# Patient Record
Sex: FEMALE | Race: WHITE | NOT HISPANIC OR LATINO | Employment: FULL TIME
[De-identification: names, ages, dates, MRNs, and addresses within clinical notes are randomized per-mention and may not be internally consistent; named-entity substitution may affect disease eponyms.]

---

## 2018-01-23 ENCOUNTER — RECORDS - HEALTHEAST (OUTPATIENT)
Dept: ADMINISTRATIVE | Facility: OTHER | Age: 23
End: 2018-01-23

## 2018-09-21 ENCOUNTER — OFFICE VISIT - HEALTHEAST (OUTPATIENT)
Dept: FAMILY MEDICINE | Facility: CLINIC | Age: 23
End: 2018-09-21

## 2018-09-21 DIAGNOSIS — F91.9 BEHAVIOR DISTURBANCE: ICD-10-CM

## 2018-09-21 DIAGNOSIS — Z23 NEED FOR IMMUNIZATION AGAINST INFLUENZA: ICD-10-CM

## 2018-09-21 ASSESSMENT — MIFFLIN-ST. JEOR: SCORE: 1728.21

## 2018-10-05 ENCOUNTER — OFFICE VISIT - HEALTHEAST (OUTPATIENT)
Dept: BEHAVIORAL HEALTH | Facility: CLINIC | Age: 23
End: 2018-10-05

## 2018-10-05 ENCOUNTER — COMMUNICATION - HEALTHEAST (OUTPATIENT)
Dept: FAMILY MEDICINE | Facility: CLINIC | Age: 23
End: 2018-10-05

## 2018-10-05 DIAGNOSIS — F43.10 POST TRAUMATIC STRESS DISORDER (PTSD): ICD-10-CM

## 2018-10-15 ENCOUNTER — OFFICE VISIT - HEALTHEAST (OUTPATIENT)
Dept: FAMILY MEDICINE | Facility: CLINIC | Age: 23
End: 2018-10-15

## 2018-10-15 DIAGNOSIS — Z30.430 ENCOUNTER FOR INSERTION OF MIRENA IUD: ICD-10-CM

## 2018-10-15 DIAGNOSIS — Z11.3 SCREEN FOR STD (SEXUALLY TRANSMITTED DISEASE): ICD-10-CM

## 2018-10-15 DIAGNOSIS — Z12.4 SCREENING FOR CERVICAL CANCER: ICD-10-CM

## 2018-10-15 LAB
HCG UR QL: NEGATIVE
SP GR UR STRIP: 1.02 (ref 1–1.03)

## 2018-10-15 ASSESSMENT — MIFFLIN-ST. JEOR: SCORE: 1745.03

## 2018-10-16 LAB
C TRACH DNA SPEC QL PROBE+SIG AMP: NEGATIVE
HPV SOURCE: ABNORMAL
HUMAN PAPILLOMA VIRUS 16 DNA: NEGATIVE
HUMAN PAPILLOMA VIRUS 18 DNA: NEGATIVE
HUMAN PAPILLOMA VIRUS FINAL DIAGNOSIS: ABNORMAL
HUMAN PAPILLOMA VIRUS OTHER HR: POSITIVE
N GONORRHOEA DNA SPEC QL NAA+PROBE: NEGATIVE
SPECIMEN DESCRIPTION: ABNORMAL

## 2018-10-17 ENCOUNTER — OFFICE VISIT - HEALTHEAST (OUTPATIENT)
Dept: FAMILY MEDICINE | Facility: CLINIC | Age: 23
End: 2018-10-17

## 2018-10-17 DIAGNOSIS — F43.10 PTSD (POST-TRAUMATIC STRESS DISORDER): ICD-10-CM

## 2018-10-17 ASSESSMENT — MIFFLIN-ST. JEOR: SCORE: 1737.09

## 2018-10-24 ENCOUNTER — COMMUNICATION - HEALTHEAST (OUTPATIENT)
Dept: FAMILY MEDICINE | Facility: CLINIC | Age: 23
End: 2018-10-24

## 2018-10-24 LAB
BKR LAB AP ABNORMAL BLEEDING: NO
BKR LAB AP BIRTH CONTROL/HORMONES: ABNORMAL
BKR LAB AP CERVICAL APPEARANCE: NORMAL
BKR LAB AP GYN ADEQUACY: ABNORMAL
BKR LAB AP GYN INTERPRETATION: ABNORMAL
BKR LAB AP GYN OTHER FINDINGS: ABNORMAL
BKR LAB AP HPV REFLEX: ABNORMAL
BKR LAB AP LMP: ABNORMAL
BKR LAB AP PATIENT STATUS: ABNORMAL
BKR LAB AP PREVIOUS ABNORMAL: ABNORMAL
BKR LAB AP PREVIOUS NORMAL: ABNORMAL
HIGH RISK?: NO
INTERPRETING LAB: ABNORMAL
PATH REPORT.COMMENTS IMP SPEC: ABNORMAL
RESULT FLAG (HE HISTORICAL CONVERSION): ABNORMAL

## 2018-10-26 ENCOUNTER — OFFICE VISIT - HEALTHEAST (OUTPATIENT)
Dept: BEHAVIORAL HEALTH | Facility: CLINIC | Age: 23
End: 2018-10-26

## 2018-10-26 DIAGNOSIS — F43.10 POSTTRAUMATIC STRESS DISORDER: ICD-10-CM

## 2018-11-02 ENCOUNTER — OFFICE VISIT - HEALTHEAST (OUTPATIENT)
Dept: BEHAVIORAL HEALTH | Facility: CLINIC | Age: 23
End: 2018-11-02

## 2018-11-02 DIAGNOSIS — F43.10 POSTTRAUMATIC STRESS DISORDER: ICD-10-CM

## 2018-11-15 ENCOUNTER — COMMUNICATION - HEALTHEAST (OUTPATIENT)
Dept: SCHEDULING | Facility: CLINIC | Age: 23
End: 2018-11-15

## 2018-11-15 ENCOUNTER — RECORDS - HEALTHEAST (OUTPATIENT)
Dept: ADMINISTRATIVE | Facility: OTHER | Age: 23
End: 2018-11-15

## 2018-11-16 ENCOUNTER — OFFICE VISIT - HEALTHEAST (OUTPATIENT)
Dept: BEHAVIORAL HEALTH | Facility: CLINIC | Age: 23
End: 2018-11-16

## 2018-11-16 DIAGNOSIS — F43.10 POSTTRAUMATIC STRESS DISORDER: ICD-10-CM

## 2018-11-19 ENCOUNTER — COMMUNICATION - HEALTHEAST (OUTPATIENT)
Dept: SCHEDULING | Facility: CLINIC | Age: 23
End: 2018-11-19

## 2018-11-28 ENCOUNTER — OFFICE VISIT - HEALTHEAST (OUTPATIENT)
Dept: FAMILY MEDICINE | Facility: CLINIC | Age: 23
End: 2018-11-28

## 2018-11-28 DIAGNOSIS — N93.9 VAGINAL BLEEDING: ICD-10-CM

## 2018-11-28 DIAGNOSIS — F43.10 PTSD (POST-TRAUMATIC STRESS DISORDER): ICD-10-CM

## 2018-11-28 DIAGNOSIS — R11.0 NAUSEA: ICD-10-CM

## 2018-11-28 ASSESSMENT — MIFFLIN-ST. JEOR: SCORE: 1737.99

## 2018-11-30 ENCOUNTER — OFFICE VISIT - HEALTHEAST (OUTPATIENT)
Dept: BEHAVIORAL HEALTH | Facility: CLINIC | Age: 23
End: 2018-11-30

## 2018-11-30 DIAGNOSIS — F43.10 PTSD (POST-TRAUMATIC STRESS DISORDER): ICD-10-CM

## 2018-12-14 ENCOUNTER — OFFICE VISIT - HEALTHEAST (OUTPATIENT)
Dept: BEHAVIORAL HEALTH | Facility: CLINIC | Age: 23
End: 2018-12-14

## 2018-12-14 DIAGNOSIS — F43.10 PTSD (POST-TRAUMATIC STRESS DISORDER): ICD-10-CM

## 2018-12-21 ENCOUNTER — COMMUNICATION - HEALTHEAST (OUTPATIENT)
Dept: BEHAVIORAL HEALTH | Facility: CLINIC | Age: 23
End: 2018-12-21

## 2019-01-11 ENCOUNTER — OFFICE VISIT - HEALTHEAST (OUTPATIENT)
Dept: BEHAVIORAL HEALTH | Facility: CLINIC | Age: 24
End: 2019-01-11

## 2019-01-11 DIAGNOSIS — F43.10 PTSD (POST-TRAUMATIC STRESS DISORDER): ICD-10-CM

## 2019-01-16 ENCOUNTER — OFFICE VISIT - HEALTHEAST (OUTPATIENT)
Dept: FAMILY MEDICINE | Facility: CLINIC | Age: 24
End: 2019-01-16

## 2019-01-16 DIAGNOSIS — M54.81 OCCIPITAL NEURALGIA OF LEFT SIDE: ICD-10-CM

## 2019-01-16 DIAGNOSIS — S46.812A TRAPEZIUS STRAIN, LEFT, INITIAL ENCOUNTER: ICD-10-CM

## 2019-01-16 DIAGNOSIS — F39 MOOD DISORDER (H): ICD-10-CM

## 2019-01-16 DIAGNOSIS — F43.10 PTSD (POST-TRAUMATIC STRESS DISORDER): ICD-10-CM

## 2019-01-16 LAB
ANION GAP SERPL CALCULATED.3IONS-SCNC: 9 MMOL/L (ref 5–18)
BASOPHILS # BLD AUTO: 0 THOU/UL (ref 0–0.2)
BASOPHILS NFR BLD AUTO: 0 % (ref 0–2)
BUN SERPL-MCNC: 13 MG/DL (ref 8–22)
CALCIUM SERPL-MCNC: 8.8 MG/DL (ref 8.5–10.5)
CHLORIDE BLD-SCNC: 107 MMOL/L (ref 98–107)
CO2 SERPL-SCNC: 22 MMOL/L (ref 22–31)
CREAT SERPL-MCNC: 0.72 MG/DL (ref 0.6–1.1)
EOSINOPHIL # BLD AUTO: 0.1 THOU/UL (ref 0–0.4)
EOSINOPHIL NFR BLD AUTO: 1 % (ref 0–6)
ERYTHROCYTE [DISTWIDTH] IN BLOOD BY AUTOMATED COUNT: 15.8 % (ref 11–14.5)
GFR SERPL CREATININE-BSD FRML MDRD: >60 ML/MIN/1.73M2
GLUCOSE BLD-MCNC: 71 MG/DL (ref 70–125)
HCT VFR BLD AUTO: 31.6 % (ref 35–47)
HGB BLD-MCNC: 10.2 G/DL (ref 12–16)
LYMPHOCYTES # BLD AUTO: 2.6 THOU/UL (ref 0.8–4.4)
LYMPHOCYTES NFR BLD AUTO: 27 % (ref 20–40)
MCH RBC QN AUTO: 22.4 PG (ref 27–34)
MCHC RBC AUTO-ENTMCNC: 32.3 G/DL (ref 32–36)
MCV RBC AUTO: 69 FL (ref 80–100)
MONOCYTES # BLD AUTO: 0.5 THOU/UL (ref 0–0.9)
MONOCYTES NFR BLD AUTO: 5 % (ref 2–10)
NEUTROPHILS # BLD AUTO: 6.2 THOU/UL (ref 2–7.7)
NEUTROPHILS NFR BLD AUTO: 66 % (ref 50–70)
PLATELET # BLD AUTO: 352 THOU/UL (ref 140–440)
PMV BLD AUTO: 8.3 FL (ref 7–10)
POTASSIUM BLD-SCNC: 4.1 MMOL/L (ref 3.5–5)
RBC # BLD AUTO: 4.56 MILL/UL (ref 3.8–5.4)
SODIUM SERPL-SCNC: 138 MMOL/L (ref 136–145)
WBC: 9.4 THOU/UL (ref 4–11)

## 2019-01-16 ASSESSMENT — MIFFLIN-ST. JEOR: SCORE: 1755.23

## 2019-01-31 ENCOUNTER — OFFICE VISIT - HEALTHEAST (OUTPATIENT)
Dept: FAMILY MEDICINE | Facility: CLINIC | Age: 24
End: 2019-01-31

## 2019-01-31 DIAGNOSIS — G43.109 MIGRAINE WITH AURA AND WITHOUT STATUS MIGRAINOSUS, NOT INTRACTABLE: ICD-10-CM

## 2019-01-31 DIAGNOSIS — F43.10 PTSD (POST-TRAUMATIC STRESS DISORDER): ICD-10-CM

## 2019-01-31 DIAGNOSIS — F39 MOOD DISORDER (H): ICD-10-CM

## 2019-01-31 DIAGNOSIS — D50.8 OTHER IRON DEFICIENCY ANEMIA: ICD-10-CM

## 2019-01-31 ASSESSMENT — MIFFLIN-ST. JEOR: SCORE: 1778.76

## 2019-02-15 ENCOUNTER — COMMUNICATION - HEALTHEAST (OUTPATIENT)
Dept: FAMILY MEDICINE | Facility: CLINIC | Age: 24
End: 2019-02-15

## 2019-02-24 ENCOUNTER — RECORDS - HEALTHEAST (OUTPATIENT)
Dept: ADMINISTRATIVE | Facility: OTHER | Age: 24
End: 2019-02-24

## 2019-02-25 ENCOUNTER — COMMUNICATION - HEALTHEAST (OUTPATIENT)
Dept: FAMILY MEDICINE | Facility: CLINIC | Age: 24
End: 2019-02-25

## 2019-02-27 ENCOUNTER — OFFICE VISIT - HEALTHEAST (OUTPATIENT)
Dept: FAMILY MEDICINE | Facility: CLINIC | Age: 24
End: 2019-02-27

## 2019-02-27 DIAGNOSIS — F39 MOOD DISORDER (H): ICD-10-CM

## 2019-02-27 DIAGNOSIS — M79.644 PAIN OF FINGER OF RIGHT HAND: ICD-10-CM

## 2019-02-27 DIAGNOSIS — F43.10 PTSD (POST-TRAUMATIC STRESS DISORDER): ICD-10-CM

## 2019-02-27 DIAGNOSIS — G43.709 CHRONIC MIGRAINE WITHOUT AURA WITHOUT STATUS MIGRAINOSUS, NOT INTRACTABLE: ICD-10-CM

## 2019-02-27 ASSESSMENT — MIFFLIN-ST. JEOR: SCORE: 1805.13

## 2019-03-11 ENCOUNTER — COMMUNICATION - HEALTHEAST (OUTPATIENT)
Dept: FAMILY MEDICINE | Facility: CLINIC | Age: 24
End: 2019-03-11

## 2019-03-19 ENCOUNTER — OFFICE VISIT - HEALTHEAST (OUTPATIENT)
Dept: FAMILY MEDICINE | Facility: CLINIC | Age: 24
End: 2019-03-19

## 2019-03-19 ENCOUNTER — RECORDS - HEALTHEAST (OUTPATIENT)
Dept: GENERAL RADIOLOGY | Facility: CLINIC | Age: 24
End: 2019-03-19

## 2019-03-19 DIAGNOSIS — G43.709 CHRONIC MIGRAINE WITHOUT AURA WITHOUT STATUS MIGRAINOSUS, NOT INTRACTABLE: ICD-10-CM

## 2019-03-19 DIAGNOSIS — F39 MOOD DISORDER (H): ICD-10-CM

## 2019-03-19 DIAGNOSIS — M79.641 PAIN IN RIGHT HAND: ICD-10-CM

## 2019-03-19 DIAGNOSIS — K29.60 OTHER GASTRITIS WITHOUT HEMORRHAGE, UNSPECIFIED CHRONICITY: ICD-10-CM

## 2019-03-19 DIAGNOSIS — F43.10 PTSD (POST-TRAUMATIC STRESS DISORDER): ICD-10-CM

## 2019-03-19 DIAGNOSIS — M79.641 PAIN OF RIGHT HAND: ICD-10-CM

## 2019-03-19 ASSESSMENT — MIFFLIN-ST. JEOR: SCORE: 1827.81

## 2019-03-26 ENCOUNTER — HOSPITAL ENCOUNTER (OUTPATIENT)
Dept: MRI IMAGING | Facility: HOSPITAL | Age: 24
Discharge: HOME | End: 2019-03-26
Attending: FAMILY MEDICINE

## 2019-03-26 DIAGNOSIS — M79.641 PAIN OF RIGHT HAND: ICD-10-CM

## 2019-03-28 ENCOUNTER — AMBULATORY - HEALTHEAST (OUTPATIENT)
Dept: FAMILY MEDICINE | Facility: CLINIC | Age: 24
End: 2019-03-28

## 2019-03-28 DIAGNOSIS — M79.641 PAIN OF RIGHT HAND: ICD-10-CM

## 2019-03-29 ENCOUNTER — COMMUNICATION - HEALTHEAST (OUTPATIENT)
Dept: FAMILY MEDICINE | Facility: CLINIC | Age: 24
End: 2019-03-29

## 2019-04-01 ENCOUNTER — RECORDS - HEALTHEAST (OUTPATIENT)
Dept: ADMINISTRATIVE | Facility: OTHER | Age: 24
End: 2019-04-01

## 2019-04-26 ENCOUNTER — RECORDS - HEALTHEAST (OUTPATIENT)
Dept: ADMINISTRATIVE | Facility: OTHER | Age: 24
End: 2019-04-26

## 2019-07-02 ENCOUNTER — COMMUNICATION - HEALTHEAST (OUTPATIENT)
Dept: SCHEDULING | Facility: CLINIC | Age: 24
End: 2019-07-02

## 2019-07-17 ENCOUNTER — OFFICE VISIT - HEALTHEAST (OUTPATIENT)
Dept: FAMILY MEDICINE | Facility: CLINIC | Age: 24
End: 2019-07-17

## 2019-07-17 DIAGNOSIS — F39 MOOD DISORDER (H): ICD-10-CM

## 2019-07-17 DIAGNOSIS — Z23 ENCOUNTER FOR IMMUNIZATION: ICD-10-CM

## 2019-07-17 DIAGNOSIS — F43.10 PTSD (POST-TRAUMATIC STRESS DISORDER): ICD-10-CM

## 2019-07-17 ASSESSMENT — MIFFLIN-ST. JEOR: SCORE: 1851.9

## 2019-08-07 ENCOUNTER — AMBULATORY - HEALTHEAST (OUTPATIENT)
Dept: BEHAVIORAL HEALTH | Facility: CLINIC | Age: 24
End: 2019-08-07

## 2019-11-19 ENCOUNTER — COMMUNICATION - HEALTHEAST (OUTPATIENT)
Dept: SCHEDULING | Facility: CLINIC | Age: 24
End: 2019-11-19

## 2019-11-25 ENCOUNTER — AMBULATORY - HEALTHEAST (OUTPATIENT)
Dept: OTHER | Facility: CLINIC | Age: 24
End: 2019-11-25

## 2020-02-10 ENCOUNTER — COMMUNICATION - HEALTHEAST (OUTPATIENT)
Dept: SCHEDULING | Facility: CLINIC | Age: 25
End: 2020-02-10

## 2020-02-20 ENCOUNTER — OFFICE VISIT - HEALTHEAST (OUTPATIENT)
Dept: FAMILY MEDICINE | Facility: CLINIC | Age: 25
End: 2020-02-20

## 2020-02-20 DIAGNOSIS — F43.10 PTSD (POST-TRAUMATIC STRESS DISORDER): ICD-10-CM

## 2020-02-20 DIAGNOSIS — K58.8 OTHER IRRITABLE BOWEL SYNDROME: ICD-10-CM

## 2020-02-20 DIAGNOSIS — R10.84 ABDOMINAL PAIN, GENERALIZED: ICD-10-CM

## 2020-02-20 ASSESSMENT — MIFFLIN-ST. JEOR: SCORE: 1849.02

## 2020-03-03 ENCOUNTER — COMMUNICATION - HEALTHEAST (OUTPATIENT)
Dept: SCHEDULING | Facility: CLINIC | Age: 25
End: 2020-03-03

## 2020-03-06 ENCOUNTER — OFFICE VISIT - HEALTHEAST (OUTPATIENT)
Dept: FAMILY MEDICINE | Facility: CLINIC | Age: 25
End: 2020-03-06

## 2020-03-06 ENCOUNTER — COMMUNICATION - HEALTHEAST (OUTPATIENT)
Dept: SCHEDULING | Facility: CLINIC | Age: 25
End: 2020-03-06

## 2020-03-06 DIAGNOSIS — R53.83 OTHER FATIGUE: ICD-10-CM

## 2020-03-06 DIAGNOSIS — F43.10 PTSD (POST-TRAUMATIC STRESS DISORDER): ICD-10-CM

## 2020-03-06 DIAGNOSIS — R11.2 NAUSEA AND VOMITING IN ADULT PATIENT: ICD-10-CM

## 2020-03-06 DIAGNOSIS — R10.84 ABDOMINAL PAIN, GENERALIZED: ICD-10-CM

## 2020-03-06 LAB
BASOPHILS # BLD AUTO: 0.1 THOU/UL (ref 0–0.2)
BASOPHILS NFR BLD AUTO: 1 % (ref 0–2)
EOSINOPHIL # BLD AUTO: 0.1 THOU/UL (ref 0–0.4)
EOSINOPHIL NFR BLD AUTO: 1 % (ref 0–6)
ERYTHROCYTE [DISTWIDTH] IN BLOOD BY AUTOMATED COUNT: 15.4 % (ref 11–14.5)
HCT VFR BLD AUTO: 35.9 % (ref 35–47)
HGB BLD-MCNC: 11.8 G/DL (ref 12–16)
LYMPHOCYTES # BLD AUTO: 3 THOU/UL (ref 0.8–4.4)
LYMPHOCYTES NFR BLD AUTO: 28 % (ref 20–40)
MCH RBC QN AUTO: 24.2 PG (ref 27–34)
MCHC RBC AUTO-ENTMCNC: 32.8 G/DL (ref 32–36)
MCV RBC AUTO: 74 FL (ref 80–100)
MONOCYTES # BLD AUTO: 0.6 THOU/UL (ref 0–0.9)
MONOCYTES NFR BLD AUTO: 5 % (ref 2–10)
NEUTROPHILS # BLD AUTO: 6.9 THOU/UL (ref 2–7.7)
NEUTROPHILS NFR BLD AUTO: 65 % (ref 50–70)
PLATELET # BLD AUTO: 360 THOU/UL (ref 140–440)
PMV BLD AUTO: 7.6 FL (ref 7–10)
RBC # BLD AUTO: 4.87 MILL/UL (ref 3.8–5.4)
WBC: 10.5 THOU/UL (ref 4–11)

## 2020-03-06 ASSESSMENT — MIFFLIN-ST. JEOR: SCORE: 1849.07

## 2020-03-10 ENCOUNTER — OFFICE VISIT - HEALTHEAST (OUTPATIENT)
Dept: FAMILY MEDICINE | Facility: CLINIC | Age: 25
End: 2020-03-10

## 2020-03-10 DIAGNOSIS — R63.0 DECREASED APPETITE: ICD-10-CM

## 2020-03-10 DIAGNOSIS — R10.31 ABDOMINAL PAIN, RIGHT LOWER QUADRANT: ICD-10-CM

## 2020-03-10 DIAGNOSIS — R53.83 OTHER FATIGUE: ICD-10-CM

## 2020-03-10 DIAGNOSIS — K62.89 RECTAL PAIN: ICD-10-CM

## 2020-03-10 DIAGNOSIS — F39 MOOD DISORDER (H): ICD-10-CM

## 2020-03-10 DIAGNOSIS — R10.11 ABDOMINAL PAIN, RIGHT UPPER QUADRANT: ICD-10-CM

## 2020-03-10 ASSESSMENT — PATIENT HEALTH QUESTIONNAIRE - PHQ9: SUM OF ALL RESPONSES TO PHQ QUESTIONS 1-9: 12

## 2020-03-10 ASSESSMENT — MIFFLIN-ST. JEOR: SCORE: 1857.86

## 2020-03-13 ENCOUNTER — RECORDS - HEALTHEAST (OUTPATIENT)
Dept: ADMINISTRATIVE | Facility: OTHER | Age: 25
End: 2020-03-13

## 2020-03-17 ENCOUNTER — RECORDS - HEALTHEAST (OUTPATIENT)
Dept: ADMINISTRATIVE | Facility: OTHER | Age: 25
End: 2020-03-17

## 2020-03-18 ENCOUNTER — COMMUNICATION - HEALTHEAST (OUTPATIENT)
Dept: SCHEDULING | Facility: CLINIC | Age: 25
End: 2020-03-18

## 2020-03-18 DIAGNOSIS — R10.84 ABDOMINAL PAIN, GENERALIZED: ICD-10-CM

## 2020-03-18 DIAGNOSIS — K58.8 OTHER IRRITABLE BOWEL SYNDROME: ICD-10-CM

## 2020-03-30 ENCOUNTER — COMMUNICATION - HEALTHEAST (OUTPATIENT)
Dept: FAMILY MEDICINE | Facility: CLINIC | Age: 25
End: 2020-03-30

## 2020-03-31 ENCOUNTER — OFFICE VISIT - HEALTHEAST (OUTPATIENT)
Dept: FAMILY MEDICINE | Facility: CLINIC | Age: 25
End: 2020-03-31

## 2020-03-31 DIAGNOSIS — R10.84 ABDOMINAL PAIN, GENERALIZED: ICD-10-CM

## 2020-03-31 DIAGNOSIS — K58.8 OTHER IRRITABLE BOWEL SYNDROME: ICD-10-CM

## 2020-03-31 DIAGNOSIS — R10.31 ABDOMINAL PAIN, RIGHT LOWER QUADRANT: ICD-10-CM

## 2020-03-31 DIAGNOSIS — K62.89 ANAL OR RECTAL PAIN: ICD-10-CM

## 2020-03-31 DIAGNOSIS — R14.0 ABDOMINAL BLOATING: ICD-10-CM

## 2020-03-31 DIAGNOSIS — F43.10 PTSD (POST-TRAUMATIC STRESS DISORDER): ICD-10-CM

## 2020-04-09 ENCOUNTER — COMMUNICATION - HEALTHEAST (OUTPATIENT)
Dept: SCHEDULING | Facility: CLINIC | Age: 25
End: 2020-04-09

## 2020-04-13 ENCOUNTER — COMMUNICATION - HEALTHEAST (OUTPATIENT)
Dept: SCHEDULING | Facility: CLINIC | Age: 25
End: 2020-04-13

## 2020-04-15 ENCOUNTER — COMMUNICATION - HEALTHEAST (OUTPATIENT)
Dept: FAMILY MEDICINE | Facility: CLINIC | Age: 25
End: 2020-04-15

## 2020-04-15 ENCOUNTER — OFFICE VISIT - HEALTHEAST (OUTPATIENT)
Dept: FAMILY MEDICINE | Facility: CLINIC | Age: 25
End: 2020-04-15

## 2020-04-15 DIAGNOSIS — S06.0X1A CONCUSSION WITH BRIEF LOC: ICD-10-CM

## 2020-04-15 DIAGNOSIS — S46.912A MUSCLE STRAIN OF LEFT SHOULDER REGION, INITIAL ENCOUNTER: ICD-10-CM
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
## 2020-05-01 ENCOUNTER — RECORDS - HEALTHEAST (OUTPATIENT)
Dept: ADMINISTRATIVE | Facility: OTHER | Age: 25
End: 2020-05-01

## 2020-05-07 ENCOUNTER — COMMUNICATION - HEALTHEAST (OUTPATIENT)
Dept: FAMILY MEDICINE | Facility: CLINIC | Age: 25
End: 2020-05-07

## 2020-05-08 ENCOUNTER — OFFICE VISIT - HEALTHEAST (OUTPATIENT)
Dept: FAMILY MEDICINE | Facility: CLINIC | Age: 25
End: 2020-05-08

## 2020-05-08 DIAGNOSIS — K29.60 OTHER GASTRITIS WITHOUT HEMORRHAGE, UNSPECIFIED CHRONICITY: ICD-10-CM

## 2020-05-08 DIAGNOSIS — M62.89 PELVIC FLOOR DYSFUNCTION IN FEMALE: ICD-10-CM

## 2020-05-08 DIAGNOSIS — K58.9 IRRITABLE BOWEL SYNDROME WITHOUT DIARRHEA: ICD-10-CM

## 2020-05-08 DIAGNOSIS — F43.10 PTSD (POST-TRAUMATIC STRESS DISORDER): ICD-10-CM

## 2020-05-08 DIAGNOSIS — F39 MOOD DISORDER (H): ICD-10-CM

## 2020-05-08 DIAGNOSIS — K62.89 ANAL PAIN: ICD-10-CM

## 2020-05-08 DIAGNOSIS — K58.8 OTHER IRRITABLE BOWEL SYNDROME: ICD-10-CM

## 2020-05-08 DIAGNOSIS — R10.84 ABDOMINAL PAIN, GENERALIZED: ICD-10-CM

## 2020-05-13 ENCOUNTER — OFFICE VISIT - HEALTHEAST (OUTPATIENT)
Dept: FAMILY MEDICINE | Facility: CLINIC | Age: 25
End: 2020-05-13

## 2020-05-13 DIAGNOSIS — M62.89 PELVIC FLOOR DYSFUNCTION IN FEMALE: ICD-10-CM

## 2020-05-13 DIAGNOSIS — F39 MOOD DISORDER (H): ICD-10-CM

## 2020-05-13 DIAGNOSIS — K62.89 ANAL PAIN: ICD-10-CM

## 2020-05-13 DIAGNOSIS — K58.9 IRRITABLE BOWEL SYNDROME WITHOUT DIARRHEA: ICD-10-CM

## 2020-07-29 ENCOUNTER — COMMUNICATION - HEALTHEAST (OUTPATIENT)
Dept: SCHEDULING | Facility: CLINIC | Age: 25
End: 2020-07-29

## 2020-08-03 ENCOUNTER — COMMUNICATION - HEALTHEAST (OUTPATIENT)
Dept: SCHEDULING | Facility: CLINIC | Age: 25
End: 2020-08-03

## 2020-08-03 ENCOUNTER — OFFICE VISIT - HEALTHEAST (OUTPATIENT)
Dept: FAMILY MEDICINE | Facility: CLINIC | Age: 25
End: 2020-08-03

## 2020-08-03 DIAGNOSIS — R10.31 RLQ ABDOMINAL PAIN: ICD-10-CM

## 2020-08-04 ENCOUNTER — COMMUNICATION - HEALTHEAST (OUTPATIENT)
Dept: SCHEDULING | Facility: CLINIC | Age: 25
End: 2020-08-04

## 2020-08-06 ENCOUNTER — OFFICE VISIT - HEALTHEAST (OUTPATIENT)
Dept: FAMILY MEDICINE | Facility: CLINIC | Age: 25
End: 2020-08-06

## 2020-08-06 DIAGNOSIS — K63.89 EPIPLOIC APPENDAGITIS: ICD-10-CM

## 2020-08-06 DIAGNOSIS — F31.9 BIPOLAR 1 DISORDER (H): ICD-10-CM

## 2020-08-06 DIAGNOSIS — R10.31 RLQ ABDOMINAL PAIN: ICD-10-CM

## 2020-08-06 DIAGNOSIS — K58.9 IRRITABLE BOWEL SYNDROME WITHOUT DIARRHEA: ICD-10-CM

## 2020-08-06 DIAGNOSIS — M62.89 PELVIC FLOOR DYSFUNCTION IN FEMALE: ICD-10-CM

## 2020-08-06 ASSESSMENT — MIFFLIN-ST. JEOR: SCORE: 1919.38

## 2020-09-16 ENCOUNTER — OFFICE VISIT - HEALTHEAST (OUTPATIENT)
Dept: FAMILY MEDICINE | Facility: CLINIC | Age: 25
End: 2020-09-16

## 2020-09-16 DIAGNOSIS — M62.89 PELVIC FLOOR DYSFUNCTION IN FEMALE: ICD-10-CM

## 2020-09-16 DIAGNOSIS — F31.9 BIPOLAR 1 DISORDER (H): ICD-10-CM

## 2020-09-16 ASSESSMENT — PATIENT HEALTH QUESTIONNAIRE - PHQ9: SUM OF ALL RESPONSES TO PHQ QUESTIONS 1-9: 22

## 2020-10-06 ENCOUNTER — COMMUNICATION - HEALTHEAST (OUTPATIENT)
Dept: FAMILY MEDICINE | Facility: CLINIC | Age: 25
End: 2020-10-06

## 2020-10-06 DIAGNOSIS — F31.9 BIPOLAR 1 DISORDER (H): ICD-10-CM

## 2020-11-17 ENCOUNTER — OFFICE VISIT - HEALTHEAST (OUTPATIENT)
Dept: FAMILY MEDICINE | Facility: CLINIC | Age: 25
End: 2020-11-17

## 2020-11-17 DIAGNOSIS — F31.9 BIPOLAR 1 DISORDER (H): ICD-10-CM

## 2020-11-17 DIAGNOSIS — N93.8 DUB (DYSFUNCTIONAL UTERINE BLEEDING): ICD-10-CM

## 2020-11-17 DIAGNOSIS — R10.84 ABDOMINAL PAIN, GENERALIZED: ICD-10-CM

## 2020-11-17 DIAGNOSIS — K58.8 OTHER IRRITABLE BOWEL SYNDROME: ICD-10-CM

## 2020-11-17 DIAGNOSIS — N64.4 NIPPLE PAIN: ICD-10-CM

## 2020-11-17 DIAGNOSIS — N39.3 FEMALE STRESS INCONTINENCE: ICD-10-CM

## 2020-11-17 DIAGNOSIS — Z20.822 EXPOSURE TO COVID-19 VIRUS: ICD-10-CM

## 2020-11-24 ENCOUNTER — OFFICE VISIT - HEALTHEAST (OUTPATIENT)
Dept: FAMILY MEDICINE | Facility: CLINIC | Age: 25
End: 2020-11-24

## 2020-11-24 DIAGNOSIS — K58.8 OTHER IRRITABLE BOWEL SYNDROME: ICD-10-CM

## 2020-11-24 DIAGNOSIS — N39.3 FEMALE STRESS INCONTINENCE: ICD-10-CM

## 2020-11-24 DIAGNOSIS — N93.8 DUB (DYSFUNCTIONAL UTERINE BLEEDING): ICD-10-CM

## 2020-11-24 DIAGNOSIS — K63.89 EPIPLOIC APPENDAGITIS: ICD-10-CM

## 2020-11-24 DIAGNOSIS — N64.4 NIPPLE PAIN: ICD-10-CM

## 2020-11-24 DIAGNOSIS — R10.84 ABDOMINAL PAIN, GENERALIZED: ICD-10-CM

## 2020-11-24 DIAGNOSIS — F31.9 BIPOLAR 1 DISORDER (H): ICD-10-CM

## 2020-11-25 ENCOUNTER — COMMUNICATION - HEALTHEAST (OUTPATIENT)
Dept: FAMILY MEDICINE | Facility: CLINIC | Age: 25
End: 2020-11-25

## 2020-12-08 ENCOUNTER — COMMUNICATION - HEALTHEAST (OUTPATIENT)
Dept: SCHEDULING | Facility: CLINIC | Age: 25
End: 2020-12-08

## 2020-12-08 DIAGNOSIS — N93.8 DYSFUNCTIONAL UTERINE BLEEDING: ICD-10-CM

## 2020-12-08 DIAGNOSIS — K58.8 OTHER IRRITABLE BOWEL SYNDROME: ICD-10-CM

## 2021-01-04 ENCOUNTER — HEALTH MAINTENANCE LETTER (OUTPATIENT)
Age: 26
End: 2021-01-04

## 2021-01-05 ENCOUNTER — COMMUNICATION - HEALTHEAST (OUTPATIENT)
Dept: FAMILY MEDICINE | Facility: CLINIC | Age: 26
End: 2021-01-05

## 2021-01-07 ENCOUNTER — OFFICE VISIT - HEALTHEAST (OUTPATIENT)
Dept: FAMILY MEDICINE | Facility: CLINIC | Age: 26
End: 2021-01-07

## 2021-01-07 DIAGNOSIS — K63.89 EPIPLOIC APPENDAGITIS: ICD-10-CM

## 2021-01-07 DIAGNOSIS — F31.9 BIPOLAR 1 DISORDER (H): ICD-10-CM

## 2021-01-07 DIAGNOSIS — K58.2 IRRITABLE BOWEL SYNDROME WITH BOTH CONSTIPATION AND DIARRHEA: ICD-10-CM

## 2021-01-07 DIAGNOSIS — F39 MOOD DISORDER (H): ICD-10-CM

## 2021-01-07 DIAGNOSIS — M62.89 PELVIC FLOOR DYSFUNCTION IN FEMALE: ICD-10-CM

## 2021-01-12 ENCOUNTER — COMMUNICATION - HEALTHEAST (OUTPATIENT)
Dept: FAMILY MEDICINE | Facility: CLINIC | Age: 26
End: 2021-01-12

## 2021-01-12 ENCOUNTER — OFFICE VISIT - HEALTHEAST (OUTPATIENT)
Dept: FAMILY MEDICINE | Facility: CLINIC | Age: 26
End: 2021-01-12

## 2021-01-12 DIAGNOSIS — K58.2 IRRITABLE BOWEL SYNDROME WITH BOTH CONSTIPATION AND DIARRHEA: ICD-10-CM

## 2021-01-12 DIAGNOSIS — R35.89 FREQUENCY OF URINATION AND POLYURIA: ICD-10-CM

## 2021-01-12 DIAGNOSIS — K58.8 OTHER IRRITABLE BOWEL SYNDROME: ICD-10-CM

## 2021-01-12 DIAGNOSIS — R10.84 ABDOMINAL PAIN, GENERALIZED: ICD-10-CM

## 2021-01-12 DIAGNOSIS — N39.3 FEMALE STRESS INCONTINENCE: ICD-10-CM

## 2021-01-12 DIAGNOSIS — F31.9 BIPOLAR 1 DISORDER (H): ICD-10-CM

## 2021-01-12 DIAGNOSIS — M62.89 PELVIC FLOOR DYSFUNCTION IN FEMALE: ICD-10-CM

## 2021-01-12 DIAGNOSIS — F39 MOOD DISORDER (H): ICD-10-CM

## 2021-01-12 DIAGNOSIS — R35.0 FREQUENCY OF URINATION AND POLYURIA: ICD-10-CM

## 2021-01-12 LAB
ALBUMIN UR-MCNC: NEGATIVE MG/DL
APPEARANCE UR: ABNORMAL
BACTERIA #/AREA URNS HPF: ABNORMAL HPF
BILIRUB UR QL STRIP: NEGATIVE
COLOR UR AUTO: YELLOW
GLUCOSE UR STRIP-MCNC: NEGATIVE MG/DL
HGB UR QL STRIP: NEGATIVE
KETONES UR STRIP-MCNC: NEGATIVE MG/DL
LEUKOCYTE ESTERASE UR QL STRIP: NEGATIVE
NITRATE UR QL: NEGATIVE
PH UR STRIP: 5.5 [PH] (ref 5–8)
RBC #/AREA URNS AUTO: ABNORMAL HPF
SP GR UR STRIP: 1.02 (ref 1–1.03)
SQUAMOUS #/AREA URNS AUTO: ABNORMAL LPF
UROBILINOGEN UR STRIP-ACNC: ABNORMAL
WBC #/AREA URNS AUTO: ABNORMAL HPF

## 2021-01-19 ENCOUNTER — OFFICE VISIT - HEALTHEAST (OUTPATIENT)
Dept: FAMILY MEDICINE | Facility: CLINIC | Age: 26
End: 2021-01-19

## 2021-01-19 ENCOUNTER — AMBULATORY - HEALTHEAST (OUTPATIENT)
Dept: LAB | Facility: CLINIC | Age: 26
End: 2021-01-19

## 2021-01-19 ENCOUNTER — COMMUNICATION - HEALTHEAST (OUTPATIENT)
Dept: FAMILY MEDICINE | Facility: CLINIC | Age: 26
End: 2021-01-19

## 2021-01-19 DIAGNOSIS — Z20.822 EXPOSURE TO COVID-19 VIRUS: ICD-10-CM

## 2021-01-19 DIAGNOSIS — R41.0 DISORIENTATION: ICD-10-CM

## 2021-01-19 DIAGNOSIS — F31.9 BIPOLAR 1 DISORDER (H): ICD-10-CM

## 2021-01-19 LAB
ALBUMIN SERPL-MCNC: 3.4 G/DL (ref 3.5–5)
ALP SERPL-CCNC: 92 U/L (ref 45–120)
ALT SERPL W P-5'-P-CCNC: 12 U/L (ref 0–45)
ANION GAP SERPL CALCULATED.3IONS-SCNC: 10 MMOL/L (ref 5–18)
AST SERPL W P-5'-P-CCNC: 15 U/L (ref 0–40)
BASOPHILS # BLD AUTO: 0.1 THOU/UL (ref 0–0.2)
BASOPHILS NFR BLD AUTO: 1 % (ref 0–2)
BILIRUB SERPL-MCNC: 0.2 MG/DL (ref 0–1)
BUN SERPL-MCNC: 9 MG/DL (ref 8–22)
CALCIUM SERPL-MCNC: 8.4 MG/DL (ref 8.5–10.5)
CHLORIDE BLD-SCNC: 107 MMOL/L (ref 98–107)
CO2 SERPL-SCNC: 22 MMOL/L (ref 22–31)
CREAT SERPL-MCNC: 0.8 MG/DL (ref 0.6–1.1)
EOSINOPHIL # BLD AUTO: 0.2 THOU/UL (ref 0–0.4)
EOSINOPHIL NFR BLD AUTO: 2 % (ref 0–6)
ERYTHROCYTE [DISTWIDTH] IN BLOOD BY AUTOMATED COUNT: 13.6 % (ref 11–14.5)
GFR SERPL CREATININE-BSD FRML MDRD: >60 ML/MIN/1.73M2
GLUCOSE BLD-MCNC: 124 MG/DL (ref 70–125)
HCT VFR BLD AUTO: 35.1 % (ref 35–47)
HGB BLD-MCNC: 11.3 G/DL (ref 12–16)
LITHIUM SERPL-SCNC: <0.2 MMOL/L (ref 0.6–1.2)
LYMPHOCYTES # BLD AUTO: 2.8 THOU/UL (ref 0.8–4.4)
LYMPHOCYTES NFR BLD AUTO: 30 % (ref 20–40)
MCH RBC QN AUTO: 24.4 PG (ref 27–34)
MCHC RBC AUTO-ENTMCNC: 32.3 G/DL (ref 32–36)
MCV RBC AUTO: 76 FL (ref 80–100)
MONOCYTES # BLD AUTO: 0.4 THOU/UL (ref 0–0.9)
MONOCYTES NFR BLD AUTO: 4 % (ref 2–10)
NEUTROPHILS # BLD AUTO: 6.1 THOU/UL (ref 2–7.7)
NEUTROPHILS NFR BLD AUTO: 64 % (ref 50–70)
PLATELET # BLD AUTO: 351 THOU/UL (ref 140–440)
PMV BLD AUTO: 7.3 FL (ref 7–10)
POTASSIUM BLD-SCNC: 3.7 MMOL/L (ref 3.5–5)
PROT SERPL-MCNC: 6.8 G/DL (ref 6–8)
RBC # BLD AUTO: 4.64 MILL/UL (ref 3.8–5.4)
SODIUM SERPL-SCNC: 139 MMOL/L (ref 136–145)
WBC: 9.5 THOU/UL (ref 4–11)

## 2021-01-20 ENCOUNTER — AMBULATORY - HEALTHEAST (OUTPATIENT)
Dept: FAMILY MEDICINE | Facility: CLINIC | Age: 26
End: 2021-01-20

## 2021-01-20 DIAGNOSIS — F31.9 BIPOLAR 1 DISORDER (H): ICD-10-CM

## 2021-01-22 ENCOUNTER — COMMUNICATION - HEALTHEAST (OUTPATIENT)
Dept: SCHEDULING | Facility: CLINIC | Age: 26
End: 2021-01-22

## 2021-02-08 ENCOUNTER — COMMUNICATION - HEALTHEAST (OUTPATIENT)
Dept: ADMINISTRATIVE | Facility: CLINIC | Age: 26
End: 2021-02-08

## 2021-02-08 ENCOUNTER — AMBULATORY - HEALTHEAST (OUTPATIENT)
Dept: FAMILY MEDICINE | Facility: CLINIC | Age: 26
End: 2021-02-08

## 2021-02-08 DIAGNOSIS — K63.89 EPIPLOIC APPENDAGITIS: ICD-10-CM

## 2021-02-08 DIAGNOSIS — R10.31 RLQ ABDOMINAL PAIN: ICD-10-CM

## 2021-02-09 ENCOUNTER — OFFICE VISIT - HEALTHEAST (OUTPATIENT)
Dept: SURGERY | Facility: CLINIC | Age: 26
End: 2021-02-09

## 2021-02-09 ENCOUNTER — COMMUNICATION - HEALTHEAST (OUTPATIENT)
Dept: SURGERY | Facility: CLINIC | Age: 26
End: 2021-02-09

## 2021-02-09 ENCOUNTER — AMBULATORY - HEALTHEAST (OUTPATIENT)
Dept: SURGERY | Facility: HOSPITAL | Age: 26
End: 2021-02-09

## 2021-02-09 DIAGNOSIS — K63.89 EPIPLOIC APPENDAGITIS: ICD-10-CM

## 2021-02-09 DIAGNOSIS — Z11.59 ENCOUNTER FOR SCREENING FOR OTHER VIRAL DISEASES: ICD-10-CM

## 2021-02-11 ENCOUNTER — AMBULATORY - HEALTHEAST (OUTPATIENT)
Dept: LAB | Facility: CLINIC | Age: 26
End: 2021-02-11

## 2021-02-11 ENCOUNTER — OFFICE VISIT - HEALTHEAST (OUTPATIENT)
Dept: FAMILY MEDICINE | Facility: CLINIC | Age: 26
End: 2021-02-11

## 2021-02-11 DIAGNOSIS — K63.89 EPIPLOIC APPENDAGITIS: ICD-10-CM

## 2021-02-11 DIAGNOSIS — Z11.59 ENCOUNTER FOR SCREENING FOR OTHER VIRAL DISEASES: ICD-10-CM

## 2021-02-11 DIAGNOSIS — Z01.818 PREOP GENERAL PHYSICAL EXAM: ICD-10-CM

## 2021-02-11 RX ORDER — OXYCODONE AND ACETAMINOPHEN 5; 325 MG/1; MG/1
1 TABLET ORAL EVERY 6 HOURS PRN
Qty: 40 TABLET | Refills: 0 | Status: SHIPPED | OUTPATIENT
Start: 2021-02-11 | End: 2022-08-11 | Stop reason: ALTCHOICE

## 2021-02-11 ASSESSMENT — MIFFLIN-ST. JEOR: SCORE: 1858.99

## 2021-02-12 LAB
SARS-COV-2 PCR COMMENT: NORMAL
SARS-COV-2 RNA SPEC QL NAA+PROBE: NEGATIVE
SARS-COV-2 VIRUS SPECIMEN SOURCE: NORMAL

## 2021-02-13 ENCOUNTER — COMMUNICATION - HEALTHEAST (OUTPATIENT)
Dept: SCHEDULING | Facility: CLINIC | Age: 26
End: 2021-02-13

## 2021-02-15 ENCOUNTER — ANESTHESIA - HEALTHEAST (OUTPATIENT)
Dept: SURGERY | Facility: HOSPITAL | Age: 26
End: 2021-02-15

## 2021-02-15 ENCOUNTER — SURGERY - HEALTHEAST (OUTPATIENT)
Dept: SURGERY | Facility: HOSPITAL | Age: 26
End: 2021-02-15

## 2021-02-15 ASSESSMENT — MIFFLIN-ST. JEOR: SCORE: 1858.99

## 2021-02-17 ENCOUNTER — AMBULATORY - HEALTHEAST (OUTPATIENT)
Dept: SURGERY | Facility: CLINIC | Age: 26
End: 2021-02-17

## 2021-02-17 DIAGNOSIS — K63.89 EPIPLOIC APPENDAGITIS: ICD-10-CM

## 2021-02-19 ENCOUNTER — COMMUNICATION - HEALTHEAST (OUTPATIENT)
Dept: SURGERY | Facility: CLINIC | Age: 26
End: 2021-02-19

## 2021-02-23 ENCOUNTER — COMMUNICATION - HEALTHEAST (OUTPATIENT)
Dept: SURGERY | Facility: CLINIC | Age: 26
End: 2021-02-23

## 2021-02-24 ENCOUNTER — COMMUNICATION - HEALTHEAST (OUTPATIENT)
Dept: NURSING | Facility: CLINIC | Age: 26
End: 2021-02-24

## 2021-02-24 ENCOUNTER — AMBULATORY - HEALTHEAST (OUTPATIENT)
Dept: CARE COORDINATION | Facility: CLINIC | Age: 26
End: 2021-02-24

## 2021-02-24 DIAGNOSIS — K58.2 IRRITABLE BOWEL SYNDROME WITH BOTH CONSTIPATION AND DIARRHEA: ICD-10-CM

## 2021-02-25 ENCOUNTER — OFFICE VISIT - HEALTHEAST (OUTPATIENT)
Dept: FAMILY MEDICINE | Facility: CLINIC | Age: 26
End: 2021-02-25

## 2021-02-25 DIAGNOSIS — R19.8 ABDOMINAL BURNING SENSATION IN RIGHT UPPER QUADRANT: ICD-10-CM

## 2021-02-25 ASSESSMENT — PATIENT HEALTH QUESTIONNAIRE - PHQ9: SUM OF ALL RESPONSES TO PHQ QUESTIONS 1-9: 7

## 2021-03-02 ENCOUNTER — OFFICE VISIT - HEALTHEAST (OUTPATIENT)
Dept: FAMILY MEDICINE | Facility: CLINIC | Age: 26
End: 2021-03-02

## 2021-03-02 DIAGNOSIS — G47.09 OTHER INSOMNIA: ICD-10-CM

## 2021-03-02 DIAGNOSIS — R10.12 ABDOMINAL PAIN, LEFT UPPER QUADRANT: ICD-10-CM

## 2021-03-02 DIAGNOSIS — K58.8 OTHER IRRITABLE BOWEL SYNDROME: ICD-10-CM

## 2021-03-02 DIAGNOSIS — R10.84 ABDOMINAL PAIN, GENERALIZED: ICD-10-CM

## 2021-03-02 RX ORDER — LIDOCAINE 50 MG/G
OINTMENT TOPICAL
Qty: 120 G | Refills: 4 | Status: SHIPPED | OUTPATIENT
Start: 2021-03-02 | End: 2023-06-06

## 2021-03-05 ENCOUNTER — COMMUNICATION - HEALTHEAST (OUTPATIENT)
Dept: FAMILY MEDICINE | Facility: CLINIC | Age: 26
End: 2021-03-05

## 2021-03-10 ENCOUNTER — OFFICE VISIT - HEALTHEAST (OUTPATIENT)
Dept: FAMILY MEDICINE | Facility: CLINIC | Age: 26
End: 2021-03-10

## 2021-03-10 DIAGNOSIS — F31.9 BIPOLAR 1 DISORDER (H): ICD-10-CM

## 2021-03-10 DIAGNOSIS — N39.41 URGE INCONTINENCE OF URINE: ICD-10-CM

## 2021-03-10 DIAGNOSIS — R10.32 ABDOMINAL WALL PAIN IN LEFT LOWER QUADRANT: ICD-10-CM

## 2021-03-10 DIAGNOSIS — R11.2 NON-INTRACTABLE VOMITING WITH NAUSEA, UNSPECIFIED VOMITING TYPE: ICD-10-CM

## 2021-03-10 RX ORDER — ONDANSETRON 4 MG/1
4 TABLET, FILM COATED ORAL EVERY 8 HOURS PRN
Qty: 16 TABLET | Refills: 0 | Status: SHIPPED | OUTPATIENT
Start: 2021-03-10 | End: 2022-07-15

## 2021-03-10 ASSESSMENT — MIFFLIN-ST. JEOR: SCORE: 1906.62

## 2021-03-23 ENCOUNTER — OFFICE VISIT - HEALTHEAST (OUTPATIENT)
Dept: PHYSICAL THERAPY | Facility: REHABILITATION | Age: 26
End: 2021-03-23

## 2021-03-23 DIAGNOSIS — R19.8 ABDOMINAL WEAKNESS: ICD-10-CM

## 2021-03-23 DIAGNOSIS — R10.9 RIGHT SIDED ABDOMINAL PAIN: ICD-10-CM

## 2021-03-23 DIAGNOSIS — R10.32 LEFT LOWER QUADRANT ABDOMINAL PAIN: ICD-10-CM

## 2021-03-23 DIAGNOSIS — R29.898 MUSCULAR DECONDITIONING: ICD-10-CM

## 2021-03-30 ENCOUNTER — OFFICE VISIT - HEALTHEAST (OUTPATIENT)
Dept: PHYSICAL THERAPY | Facility: REHABILITATION | Age: 26
End: 2021-03-30

## 2021-03-30 DIAGNOSIS — R10.32 LEFT LOWER QUADRANT ABDOMINAL PAIN: ICD-10-CM

## 2021-03-30 DIAGNOSIS — R19.8 ABDOMINAL WEAKNESS: ICD-10-CM

## 2021-03-30 DIAGNOSIS — R29.898 MUSCULAR DECONDITIONING: ICD-10-CM

## 2021-03-30 DIAGNOSIS — R10.9 RIGHT SIDED ABDOMINAL PAIN: ICD-10-CM

## 2021-04-01 ENCOUNTER — OFFICE VISIT - HEALTHEAST (OUTPATIENT)
Dept: PHYSICAL THERAPY | Facility: REHABILITATION | Age: 26
End: 2021-04-01

## 2021-04-01 DIAGNOSIS — R29.898 MUSCULAR DECONDITIONING: ICD-10-CM

## 2021-04-01 DIAGNOSIS — R10.32 LEFT LOWER QUADRANT ABDOMINAL PAIN: ICD-10-CM

## 2021-04-01 DIAGNOSIS — R19.8 ABDOMINAL WEAKNESS: ICD-10-CM

## 2021-04-01 DIAGNOSIS — R10.9 RIGHT SIDED ABDOMINAL PAIN: ICD-10-CM

## 2021-04-06 ENCOUNTER — OFFICE VISIT - HEALTHEAST (OUTPATIENT)
Dept: PHYSICAL THERAPY | Facility: REHABILITATION | Age: 26
End: 2021-04-06

## 2021-04-06 DIAGNOSIS — Z90.49 HISTORY OF APPENDECTOMY: ICD-10-CM

## 2021-04-06 DIAGNOSIS — R10.32 LEFT LOWER QUADRANT ABDOMINAL PAIN: ICD-10-CM

## 2021-04-06 DIAGNOSIS — R19.8 ABDOMINAL WEAKNESS: ICD-10-CM

## 2021-04-06 DIAGNOSIS — Z98.890 HISTORY OF LAPAROSCOPY: ICD-10-CM

## 2021-04-06 DIAGNOSIS — R29.898 MUSCULAR DECONDITIONING: ICD-10-CM

## 2021-04-06 DIAGNOSIS — R10.9 RIGHT SIDED ABDOMINAL PAIN: ICD-10-CM

## 2021-04-22 ENCOUNTER — OFFICE VISIT - HEALTHEAST (OUTPATIENT)
Dept: PHYSICAL THERAPY | Facility: REHABILITATION | Age: 26
End: 2021-04-22

## 2021-04-22 DIAGNOSIS — R10.32 LEFT LOWER QUADRANT ABDOMINAL PAIN: ICD-10-CM

## 2021-04-22 DIAGNOSIS — Z98.890 HISTORY OF LAPAROSCOPY: ICD-10-CM

## 2021-04-22 DIAGNOSIS — Z90.49 HISTORY OF APPENDECTOMY: ICD-10-CM

## 2021-04-22 DIAGNOSIS — R19.8 ABDOMINAL WEAKNESS: ICD-10-CM

## 2021-04-22 DIAGNOSIS — R10.9 RIGHT SIDED ABDOMINAL PAIN: ICD-10-CM

## 2021-04-22 DIAGNOSIS — R29.898 MUSCULAR DECONDITIONING: ICD-10-CM

## 2021-04-27 ENCOUNTER — OFFICE VISIT - HEALTHEAST (OUTPATIENT)
Dept: PHYSICAL THERAPY | Facility: REHABILITATION | Age: 26
End: 2021-04-27

## 2021-04-27 DIAGNOSIS — R10.9 RIGHT SIDED ABDOMINAL PAIN: ICD-10-CM

## 2021-04-27 DIAGNOSIS — R10.32 LEFT LOWER QUADRANT ABDOMINAL PAIN: ICD-10-CM

## 2021-04-27 DIAGNOSIS — R19.8 ABDOMINAL WEAKNESS: ICD-10-CM

## 2021-05-04 ENCOUNTER — OFFICE VISIT - HEALTHEAST (OUTPATIENT)
Dept: PHYSICAL THERAPY | Facility: REHABILITATION | Age: 26
End: 2021-05-04

## 2021-05-04 DIAGNOSIS — R29.898 MUSCULAR DECONDITIONING: ICD-10-CM

## 2021-05-04 DIAGNOSIS — R10.32 LEFT LOWER QUADRANT ABDOMINAL PAIN: ICD-10-CM

## 2021-05-04 DIAGNOSIS — R10.9 RIGHT SIDED ABDOMINAL PAIN: ICD-10-CM

## 2021-05-04 DIAGNOSIS — R19.8 ABDOMINAL WEAKNESS: ICD-10-CM

## 2021-05-05 ENCOUNTER — OFFICE VISIT - HEALTHEAST (OUTPATIENT)
Dept: FAMILY MEDICINE | Facility: CLINIC | Age: 26
End: 2021-05-05

## 2021-05-05 DIAGNOSIS — N61.1 ABSCESS OF RIGHT BREAST: ICD-10-CM

## 2021-05-05 DIAGNOSIS — R10.84 ABDOMINAL PAIN, GENERALIZED: ICD-10-CM

## 2021-05-05 DIAGNOSIS — F39 MOOD DISORDER (H): ICD-10-CM

## 2021-05-05 DIAGNOSIS — K58.8 OTHER IRRITABLE BOWEL SYNDROME: ICD-10-CM

## 2021-05-05 DIAGNOSIS — M62.89 PELVIC FLOOR DYSFUNCTION IN FEMALE: ICD-10-CM

## 2021-05-05 DIAGNOSIS — F31.9 BIPOLAR 1 DISORDER (H): ICD-10-CM

## 2021-05-05 RX ORDER — DICYCLOMINE HCL 20 MG
20 TABLET ORAL 4 TIMES DAILY PRN
Qty: 100 TABLET | Refills: 5 | Status: SHIPPED | OUTPATIENT
Start: 2021-05-05 | End: 2021-11-17

## 2021-05-05 ASSESSMENT — MIFFLIN-ST. JEOR: SCORE: 1906.9

## 2021-05-06 ENCOUNTER — OFFICE VISIT - HEALTHEAST (OUTPATIENT)
Dept: PHYSICAL THERAPY | Facility: REHABILITATION | Age: 26
End: 2021-05-06

## 2021-05-06 DIAGNOSIS — R29.898 MUSCULAR DECONDITIONING: ICD-10-CM

## 2021-05-06 DIAGNOSIS — R10.9 RIGHT SIDED ABDOMINAL PAIN: ICD-10-CM

## 2021-05-06 DIAGNOSIS — R10.32 LEFT LOWER QUADRANT ABDOMINAL PAIN: ICD-10-CM

## 2021-05-06 DIAGNOSIS — Z98.890 HISTORY OF LAPAROSCOPY: ICD-10-CM

## 2021-05-06 DIAGNOSIS — Z90.49 HISTORY OF APPENDECTOMY: ICD-10-CM

## 2021-05-06 DIAGNOSIS — R19.8 ABDOMINAL WEAKNESS: ICD-10-CM

## 2021-05-14 ENCOUNTER — OFFICE VISIT - HEALTHEAST (OUTPATIENT)
Dept: FAMILY MEDICINE | Facility: CLINIC | Age: 26
End: 2021-05-14

## 2021-05-14 DIAGNOSIS — M62.89 PELVIC FLOOR DYSFUNCTION IN FEMALE: ICD-10-CM

## 2021-05-14 DIAGNOSIS — R41.3 MEMORY PROBLEM: ICD-10-CM

## 2021-05-14 DIAGNOSIS — R25.9 ABNORMAL INVOLUNTARY MOVEMENT: ICD-10-CM

## 2021-05-14 DIAGNOSIS — F31.9 BIPOLAR 1 DISORDER (H): ICD-10-CM

## 2021-05-14 DIAGNOSIS — R44.1 EPISODES OF FORMED VISUAL HALLUCINATIONS: ICD-10-CM

## 2021-05-14 DIAGNOSIS — F43.10 PTSD (POST-TRAUMATIC STRESS DISORDER): ICD-10-CM

## 2021-05-14 DIAGNOSIS — F39 MOOD DISORDER (H): ICD-10-CM

## 2021-05-14 DIAGNOSIS — K58.2 IRRITABLE BOWEL SYNDROME WITH BOTH CONSTIPATION AND DIARRHEA: ICD-10-CM

## 2021-05-14 LAB
ALBUMIN SERPL-MCNC: 3.4 G/DL (ref 3.5–5)
ALP SERPL-CCNC: 86 U/L (ref 45–120)
ALT SERPL W P-5'-P-CCNC: 16 U/L (ref 0–45)
ANION GAP SERPL CALCULATED.3IONS-SCNC: 7 MMOL/L (ref 5–18)
AST SERPL W P-5'-P-CCNC: 15 U/L (ref 0–40)
BILIRUB SERPL-MCNC: 0.3 MG/DL (ref 0–1)
BUN SERPL-MCNC: 7 MG/DL (ref 8–22)
CALCIUM SERPL-MCNC: 8.9 MG/DL (ref 8.5–10.5)
CHLORIDE BLD-SCNC: 106 MMOL/L (ref 98–107)
CO2 SERPL-SCNC: 28 MMOL/L (ref 22–31)
CREAT SERPL-MCNC: 0.76 MG/DL (ref 0.6–1.1)
GFR SERPL CREATININE-BSD FRML MDRD: >60 ML/MIN/1.73M2
GLUCOSE BLD-MCNC: 82 MG/DL (ref 70–125)
POTASSIUM BLD-SCNC: 4.2 MMOL/L (ref 3.5–5)
PROT SERPL-MCNC: 6.7 G/DL (ref 6–8)
SODIUM SERPL-SCNC: 141 MMOL/L (ref 136–145)

## 2021-05-14 RX ORDER — CITALOPRAM HYDROBROMIDE 20 MG/1
20 TABLET ORAL DAILY
Qty: 30 TABLET | Refills: 2 | Status: SHIPPED | OUTPATIENT
Start: 2021-05-14 | End: 2021-12-30

## 2021-05-14 RX ORDER — OLANZAPINE 5 MG/1
5 TABLET ORAL AT BEDTIME
Qty: 30 TABLET | Refills: 2 | Status: SHIPPED | OUTPATIENT
Start: 2021-05-14 | End: 2022-04-07 | Stop reason: ALTCHOICE

## 2021-05-14 RX ORDER — PREGABALIN 75 MG/1
75 CAPSULE ORAL 3 TIMES DAILY
Qty: 90 CAPSULE | Refills: 2 | Status: SHIPPED | OUTPATIENT
Start: 2021-05-14 | End: 2021-12-30

## 2021-05-20 ENCOUNTER — COMMUNICATION - HEALTHEAST (OUTPATIENT)
Dept: FAMILY MEDICINE | Facility: CLINIC | Age: 26
End: 2021-05-20

## 2021-05-27 ENCOUNTER — OFFICE VISIT - HEALTHEAST (OUTPATIENT)
Dept: PHYSICAL THERAPY | Facility: REHABILITATION | Age: 26
End: 2021-05-27

## 2021-05-27 VITALS
HEIGHT: 64 IN | TEMPERATURE: 97.8 F | RESPIRATION RATE: 20 BRPM | SYSTOLIC BLOOD PRESSURE: 100 MMHG | BODY MASS INDEX: 44.48 KG/M2 | WEIGHT: 260.56 LBS | DIASTOLIC BLOOD PRESSURE: 70 MMHG | HEART RATE: 80 BPM

## 2021-05-27 VITALS
BODY MASS INDEX: 45.66 KG/M2 | TEMPERATURE: 98 F | OXYGEN SATURATION: 97 % | DIASTOLIC BLOOD PRESSURE: 68 MMHG | SYSTOLIC BLOOD PRESSURE: 96 MMHG | HEART RATE: 95 BPM | WEIGHT: 266 LBS

## 2021-05-27 DIAGNOSIS — R10.9 RIGHT SIDED ABDOMINAL PAIN: ICD-10-CM

## 2021-05-27 DIAGNOSIS — R29.898 MUSCULAR DECONDITIONING: ICD-10-CM

## 2021-05-27 DIAGNOSIS — Z98.890 HISTORY OF LAPAROSCOPY: ICD-10-CM

## 2021-05-27 DIAGNOSIS — R19.8 ABDOMINAL WEAKNESS: ICD-10-CM

## 2021-05-27 DIAGNOSIS — Z90.49 HISTORY OF APPENDECTOMY: ICD-10-CM

## 2021-05-27 DIAGNOSIS — R10.32 LEFT LOWER QUADRANT ABDOMINAL PAIN: ICD-10-CM

## 2021-05-27 ASSESSMENT — PATIENT HEALTH QUESTIONNAIRE - PHQ9
SUM OF ALL RESPONSES TO PHQ QUESTIONS 1-9: 7
SUM OF ALL RESPONSES TO PHQ QUESTIONS 1-9: 12
SUM OF ALL RESPONSES TO PHQ QUESTIONS 1-9: 22

## 2021-05-27 NOTE — TELEPHONE ENCOUNTER
ARELI contacted the patient and notified her that Attending Physician's Statement for STD claim was faxed to Hilaria Paulino at [facility] (fax [phone]). Transmission confirmation received.     Copy taken to  for patient to .

## 2021-05-27 NOTE — PROGRESS NOTES
Today I talked to the patient regarding her MRI results, I will be sending her to orthopedics.  She understands this.  She has some [facility] paperwork that she needs me to fill out and drop them off at the .

## 2021-05-27 NOTE — TELEPHONE ENCOUNTER
Who is calling:  PATIENT  Reason for Call: forms INCOMPLETE, need last 3 OV faxed with forms ASAP, please call patient and advise   Date of last appointment with primary care: 3/19/19   Has the patient been recently seen:  Yes  Okay to leave a detailed message: Yes

## 2021-05-28 ENCOUNTER — OFFICE VISIT - HEALTHEAST (OUTPATIENT)
Dept: FAMILY MEDICINE | Facility: CLINIC | Age: 26
End: 2021-05-28

## 2021-05-28 DIAGNOSIS — M62.89 PELVIC FLOOR DYSFUNCTION IN FEMALE: ICD-10-CM

## 2021-05-28 DIAGNOSIS — K58.2 IRRITABLE BOWEL SYNDROME WITH BOTH CONSTIPATION AND DIARRHEA: ICD-10-CM

## 2021-05-28 DIAGNOSIS — F43.10 PTSD (POST-TRAUMATIC STRESS DISORDER): ICD-10-CM

## 2021-05-28 DIAGNOSIS — F31.9 BIPOLAR 1 DISORDER (H): ICD-10-CM

## 2021-05-28 DIAGNOSIS — F39 MOOD DISORDER (H): ICD-10-CM

## 2021-05-28 RX ORDER — LITHIUM CARBONATE 300 MG
300 TABLET ORAL 4 TIMES DAILY
Qty: 120 TABLET | Refills: 1 | Status: SHIPPED | OUTPATIENT
Start: 2021-05-28 | End: 2021-09-22

## 2021-05-28 ASSESSMENT — MIFFLIN-ST. JEOR: SCORE: 1945.74
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
## 2021-06-02 VITALS — BODY MASS INDEX: 39.81 KG/M2 | HEIGHT: 64 IN | WEIGHT: 233.19 LBS

## 2021-06-02 VITALS — WEIGHT: 244 LBS | HEIGHT: 64 IN | BODY MASS INDEX: 41.66 KG/M2

## 2021-06-02 VITALS — HEIGHT: 64 IN | BODY MASS INDEX: 38.93 KG/M2 | WEIGHT: 228 LBS

## 2021-06-02 VITALS — HEIGHT: 64 IN | WEIGHT: 225.75 LBS | BODY MASS INDEX: 38.54 KG/M2

## 2021-06-02 VITALS — HEIGHT: 64 IN | BODY MASS INDEX: 37.65 KG/M2 | WEIGHT: 220.56 LBS

## 2021-06-02 VITALS — HEIGHT: 64 IN | BODY MASS INDEX: 40.8 KG/M2 | WEIGHT: 239 LBS

## 2021-06-02 VITALS — BODY MASS INDEX: 38.24 KG/M2 | WEIGHT: 224 LBS | HEIGHT: 64 IN

## 2021-06-02 VITALS — HEIGHT: 64 IN | BODY MASS INDEX: 38.28 KG/M2 | WEIGHT: 224.2 LBS

## 2021-06-03 VITALS — BODY MASS INDEX: 42.56 KG/M2 | WEIGHT: 249.31 LBS | HEIGHT: 64 IN

## 2021-06-03 NOTE — TELEPHONE ENCOUNTER
"RN Triage:     Patient calling to see if her insurance is active. She was repeating her insurance was \"pending\". She was advised triage does not talk to patients about insurance.   Caller declined triage stating \"what is that going to do for me?\"  Caller wanted an appointment and stated the ED told her to follow up with her PCP.  Caller transferred to scheduling and hung up before transferred.     Paty RN, BSN [facility] Triage Nurse    Reason for Disposition    Caller hangs up    Protocols used: DIFFICULT CALLER-A-AH      "

## 2021-06-04 VITALS
BODY MASS INDEX: 44.53 KG/M2 | WEIGHT: 251.3 LBS | SYSTOLIC BLOOD PRESSURE: 102 MMHG | OXYGEN SATURATION: 98 % | DIASTOLIC BLOOD PRESSURE: 70 MMHG | RESPIRATION RATE: 20 BRPM | HEART RATE: 111 BPM | TEMPERATURE: 97.8 F | HEIGHT: 63 IN

## 2021-06-04 VITALS
BODY MASS INDEX: 42.94 KG/M2 | HEIGHT: 64 IN | SYSTOLIC BLOOD PRESSURE: 92 MMHG | RESPIRATION RATE: 18 BRPM | WEIGHT: 251.5 LBS | HEART RATE: 68 BPM | TEMPERATURE: 97.7 F | DIASTOLIC BLOOD PRESSURE: 64 MMHG

## 2021-06-04 VITALS
SYSTOLIC BLOOD PRESSURE: 100 MMHG | OXYGEN SATURATION: 99 % | BODY MASS INDEX: 44.95 KG/M2 | DIASTOLIC BLOOD PRESSURE: 58 MMHG | HEART RATE: 100 BPM | RESPIRATION RATE: 16 BRPM | TEMPERATURE: 97.8 F | HEIGHT: 64 IN | WEIGHT: 263.31 LBS

## 2021-06-04 VITALS
TEMPERATURE: 97.5 F | SYSTOLIC BLOOD PRESSURE: 106 MMHG | HEART RATE: 72 BPM | WEIGHT: 249.56 LBS | BODY MASS INDEX: 42.61 KG/M2 | RESPIRATION RATE: 18 BRPM | DIASTOLIC BLOOD PRESSURE: 70 MMHG | HEIGHT: 64 IN

## 2021-06-05 VITALS — WEIGHT: 250 LBS | SYSTOLIC BLOOD PRESSURE: 118 MMHG | BODY MASS INDEX: 42.91 KG/M2 | DIASTOLIC BLOOD PRESSURE: 72 MMHG

## 2021-06-05 VITALS
TEMPERATURE: 98.1 F | RESPIRATION RATE: 16 BRPM | SYSTOLIC BLOOD PRESSURE: 100 MMHG | BODY MASS INDEX: 42.68 KG/M2 | HEART RATE: 84 BPM | DIASTOLIC BLOOD PRESSURE: 62 MMHG | WEIGHT: 250 LBS | HEIGHT: 64 IN

## 2021-06-05 VITALS
HEART RATE: 84 BPM | TEMPERATURE: 97.7 F | RESPIRATION RATE: 18 BRPM | HEIGHT: 64 IN | WEIGHT: 260.5 LBS | BODY MASS INDEX: 44.47 KG/M2 | SYSTOLIC BLOOD PRESSURE: 106 MMHG | DIASTOLIC BLOOD PRESSURE: 70 MMHG

## 2021-06-05 VITALS — BODY MASS INDEX: 42.68 KG/M2 | WEIGHT: 250 LBS | HEIGHT: 64 IN

## 2021-06-05 NOTE — TELEPHONE ENCOUNTER
New Appointment Needed  What is the reason for the visit:    Physical / colon concerns / med question  Provider Preference: PCP only  How soon do you need to be seen?: as soon as able. I advised patient could come in for just an office visit as soon as this week but she would like to come in for the physical as well. Please call patient to go over options.  Waitlist offered?: No  Okay to leave a detailed message:  Yes

## 2021-06-06 NOTE — PROGRESS NOTES
ASSESSMENT AND PLAN:  1. Mood disorder (H)  PHQ 9 reviewed with patient she feels that she is not doing well because she is been abdominal pain.  Patient seen at [facility]  - PHQ9 Depression Screen    2. Abdominal pain, right upper quadrant    .  Patient seen at [facility] CT of the abdomen revealed no abnormal findings she has a pelvic ultrasound.  Lab tests were unremarkable.  I am referring her to gastroenterology started on Toradol for the discomfort  - Ambulatory referral to Gastroenterology  - ketorolac (TORADOL) 10 mg tablet; Take 1 tablet (10 mg total) by mouth every 6 (six) hours as needed for pain.  Dispense: 40 tablet; Refill: 0    3. Abdominal pain, right lower quadrant  Continue Celexa for IBS continue Bentyl  - Ambulatory referral to Gastroenterology  - ketorolac (TORADOL) 10 mg tablet; Take 1 tablet (10 mg total) by mouth every 6 (six) hours as needed for pain.  Dispense: 40 tablet; Refill: 0    4. Rectal pain  No recurrent blood passed per rectum, rectal pain is noted  - Ambulatory referral to Gastroenterology  - ketorolac (TORADOL) 10 mg tablet; Take 1 tablet (10 mg total) by mouth every 6 (six) hours as needed for pain.  Dispense: 40 tablet; Refill: 0    5. Decreased appetite    Patient is not eating regularly she takes Zofran as needed for nausea.  Denies any vomiting today.  - Ambulatory referral to Gastroenterology    6. Other fatigue    Mammogram done here last week shows increased hemoglobin no evidence of anemia blood loss            Orders Placed This Encounter   Procedures     Ambulatory referral to Gastroenterology     Referral Priority:   Routine     Referral Type:   Consultation     Referral Reason:   Evaluation and Treatment     Requested Specialty:   Gastroenterology     Number of Visits Requested:   1     PHQ9 Depression Screen     Medications Discontinued During This Encounter   Medication Reason     diclofenac (VOLTAREN) 75 MG EC tablet Therapy completed       No  follow-ups on file.    CHIEF COMPLAINT:  Chief Complaint   Patient presents with     Hospital Visit Follow Up       HISTORY OF PRESENT ILLNESS:  Juan R is a 25 y.o. female who presents to the clinic today for ED follow up. Following her visit with me on 03/06/2020, she called into nurse triage later that day with reports of persistent right lower quadrant abdominal pain and rectal pressure. Recommendations were to go to Urgent Care. The patient ended up presenting to [facility] ED that evening. White count was normal. Hemoglobin was mildly low at 11.3. Urine pregnancy test negative. Appendix was not visualized on dedicated ultrasound study. Pelvic ultrasound was unremarkable. CT of pelvis showed no evidence of perianal abscess or other area of focal inflammatory change. There was a 1.8 x 1.2 cm small involutional hemorrhagic suspected cyst in the left ovary. Her pain was controlled with pain medications. Recommendations were given to follow up with primary care provider and see GYN.     Juan R reports that her pain recurred the following day after her ED visit. The pain is located in her right lower abdomen and radiates around to her back. She did not have any bowel movements over the weekend. The patient continues on Bentyl though feels it did not help much. She has been taking Zofran once a day for nausea in the morning or after eating. Her appetite is poor. Juan R states that it feels like she is going to die. She is very fatigued and is sleeping a lot. The patient denies any further weight loss. Her PHQ-9 score today is 12.     REVIEW OF SYSTEMS:   General: Fatigue. Poor appetite.  GI: Right-sided abdominal pain radiating to back. Nausea.  Psychiatric: Depression.  All other 10 point review of systems are negative.    PFSH:  No current smoking use. Reviewed as below.     TOBACCO USE:  Social History     Tobacco Use   Smoking Status Former Smoker   Smokeless Tobacco Never Used       VITALS:  Vitals:     "03/10/20 0925   BP: 92/64   Pulse: 68   Resp: 18   Temp: 97.7  F (36.5  C)   TempSrc: Oral   Weight: (!) 251 lb 8 oz (114.1 kg)   Height: 5' 3.5\" (1.613 m)     Wt Readings from Last 3 Encounters:   03/10/20 (!) 251 lb 8 oz (114.1 kg)   03/06/20 (!) 249 lb 9 oz (113.2 kg)   02/20/20 (!) 251 lb 4.8 oz (114 kg)     Body mass index is 43.85 kg/m .      PHYSICAL EXAM:  General: Alert, cooperative, no distress, appears stated age  Head: Normocephalic, without obvious abnormality, atraumatic, moist mucous membranes  Eyes: PERRL, conjunctiva/cornea clear, EOM's intact  Neck: Supple, no cervical lymph node enlargement   Lungs: Clear to auscultation bilaterally, respirations unlabored  Heart: Regular rate and rhythm, S1 and S2 normal, no murmur, rub, or gallop  Abdomen: Soft, tender to palpation over the right upper quadrant, right lower quadrant, and over the bladder, bowel sounds active all four quadrants, no masses, no organomegaly.  Neurologic:  A & O x 3.  No tremor, no focal findings.  Normal gait.   Psychiatric: Normal affect, good eye contact, well-groomed  Skin: No rash or suspicious lesions noted on exposed skin, non-diaphoretic    DATA REVIEWED:  Additional History from Old Records Summarized (2): Reviewed [facility] 03/06/2020 ED note regarding right-sided abdominal pain.  Decision to Obtain Records (1):  none  Radiology Tests Summarized or Ordered (1): 03/06/2020 CT pelvis showed no evidence of perianal abscess or other area of focal inflammatory change; 1.8 x 1.2 cm small involutional hemorrhagic suspected cyst in the left ovary seen.  Labs Reviewed or Ordered (1): Reviewed 03/06/2020 labs.  Medicine Test Summarized or Ordered (1): none  Independent Review of EKG or X-RAY(2 each): none    Total time was 15 minutes, greater than 50% counseling and coordinating care regarding the above issues.     Claire COHEN, am scribing for and in the presence of, Dr. Donaldson.    I, Dr. Donaldson, personally performed the " services described in this documentation, as scribed by Claire Julien in my presence, and it is both accurate and complete.      MEDICATIONS:  Current Outpatient Medications   Medication Sig Dispense Refill     citalopram (CELEXA) 40 MG tablet Take 1 tablet (40 mg total) by mouth daily. 30 tablet 2     dicyclomine (BENTYL) 20 mg tablet Take 1 tablet (20 mg total) by mouth 3 (three) times a day as needed. 30 tablet 1     ferrous sulfate 325 (65 FE) MG tablet Take 1 tablet (325 mg total) by mouth 2 (two) times a day. 60 tablet 3     gabapentin (NEURONTIN) 300 MG capsule Take 1 capsule (300 mg total) by mouth 2 (two) times a day. 60 capsule 0     HYDROcodone-acetaminophen 5-325 mg per tablet Take 1 tablet by mouth every 8 (eight) hours as needed for pain. 6 tablet 0     ketorolac (TORADOL) 10 mg tablet Take 1 tablet (10 mg total) by mouth every 6 (six) hours as needed for pain. 40 tablet 0     lithium 300 mg tablet Take 1 tablet (300 mg total) by mouth 3 (three) times a day. 90 tablet 2     omeprazole (PRILOSEC) 20 MG capsule Take 1 capsule (20 mg total) by mouth daily. 30 capsule 11     ondansetron (ZOFRAN ODT) 4 MG disintegrating tablet Take 1 tablet (4 mg total) by mouth every 8 (eight) hours as needed. 4mg 10 tablet 0     ranitidine (ZANTAC) 150 MG tablet Take 1 tablet (150 mg total) by mouth 2 (two) times a day. 60 tablet 1     topiramate (TOPAMAX) 25 MG tablet Take 1 tablet (25 mg total) by mouth 2 (two) times a day. 60 tablet 2     No current facility-administered medications for this visit.        Total Data Points: 4    Please note that this clinical encounter uses voice recognition software, there may be typographical errors present

## 2021-06-06 NOTE — PATIENT INSTRUCTIONS - HE
IBS with generalized abdominal pain:     Start dicyclomine (BENTYL) 20 mg tablet; Take 1 tablet (20 mg total) by mouth 3 (three) times a day with food.     PTSD:     Start citalopram (CELEXA) 40 MG tablet; Take 1 tablet (40 mg total) by mouth daily.     Gastritis:     Start omeprazole (PRILOSEC) 20 MG capsule; Take 1 capsule (20 mg total) by mouth daily.     Please bring all your medications to your next visit.

## 2021-06-06 NOTE — TELEPHONE ENCOUNTER
"RN Assessment/Reason for Call:   Okay to leave Detailed Message  Patient calling in, saw Dr 11a \"problems with stomach.\"  IBS cant eat much  Ate;  pain in stomach, pressure on rectum   Had BM \"still don't feel right.\"  Took diclofenac x 2. Pain wont go away.  \" my mental  Health is under control\"   Discussed Gi consult; has not had one.  Zofran 4mg not working.   LMP x2 last month...    RN Action/Disposition:  Protocol recommends see Dr in ER/UC.  Call back if worse symptoms  Discussed home care measures.  Agrees to plan.     Sridevi Cuevas, TRACY    [facility] Triage/med refill  3/6/2020  2:32 PM        Reason for Disposition    [1] MILD-MODERATE pain AND [2] constant AND [3] present > 2 hours    Protocols used: ABDOMINAL PAIN - FEMALE-A-AH      "

## 2021-06-06 NOTE — TELEPHONE ENCOUNTER
Pt last saw PCP on 3/6/20 after ED visit.  At that time, pt said med was not working.  See below.  How would covering provider like to proceed?  Thanks.

## 2021-06-06 NOTE — PROGRESS NOTES
ASSESSMENT AND PLAN:  1. PTSD (post-traumatic stress disorder)  Patient's not been taking her medication for PTSD for several months I like her to restart this medication is also help with her irritable bowel syndrome she understands that she is needing to take the medication on a daily basis.  - citalopram (CELEXA) 40 MG tablet; Take 1 tablet (40 mg total) by mouth daily.  Dispense: 30 tablet; Refill: 2    2. Other irritable bowel syndrome    Diffuse abdominal pain noted difficulty with eating noted dicyclomine prescribed side effects discussed  - dicyclomine (BENTYL) 20 mg tablet; Take 1 tablet (20 mg total) by mouth 3 (three) times a day as needed.  Dispense: 30 tablet; Refill: 1    3. Abdominal pain, generalized    - dicyclomine (BENTYL) 20 mg tablet; Take 1 tablet (20 mg total) by mouth 3 (three) times a day as needed.  Dispense: 30 tablet; Refill: 1  - omeprazole (PRILOSEC) 20 MG capsule; Take 1 capsule (20 mg total) by mouth daily.  Dispense: 30 capsule; Refill: 11            Orders Placed This Encounter   Procedures     Influenza, Seasonal Quad, PF =/> 6months     Medications Discontinued During This Encounter   Medication Reason     citalopram (CELEXA) 40 MG tablet Reorder       Return in about 3 weeks (around 3/12/2020) for Annual physical.    CHIEF COMPLAINT:  Chief Complaint   Patient presents with     Annual Exam     Pt is getting over the flu.      Heartburn     Med she was taking was recalled, would like it changed.        HISTORY OF PRESENT ILLNESS:  Juan R is a 25 y.o. female who presents to the clinic today for abdominal pain. She was initially evaluated in [facility] ED on 11/18/2019 for pelvic pain. Ultrasound of the pelvis did show likely ruptured corpus luteal cyst with malpositioned IUD. CT scan did the epiploic appendagitis. Her IUD was removed. The patient was also treated for UTI at the time. She returned the following day for worsening pain. Repeat hemoglobin was reassuring so pain control  "measures were recommended.     Juan R was then evaluated at [facility] ED on 11/24/2019. CTA abdomen and pelvic showed no intra-abdominal hemorrhage. There was a small focus ovoid density sigmoid that was unchanged from previous imaging. Pelvic ultrasound showed normal flow to both ovaries and was otherwise normal. Labs showed no leukocytosis. Hemoglobin was stable. Wet prep did showed clue cells so she was placed on metronidazole. Recommendations to use Tylenol or ibuprofen for pain with short course of Vicodin as needed for breakthrough pain.     At this point, Juan R reports significant cramping pain in her right upper quadrant every day. It wraps around to her back. Her appetite is poor as eating triggers her abdominal pain. Ibuprofen and Tylenol do not give her any pain relief.     She was seen in [facility] ED on 01/27/2020 for influenza-like symptoms. Influenza tests were negative at the time.     She reportedly has stopped all her medications for anxiety. The patient is not currently on any medications for her bipolar disorder, and is also not going to therapy. She reports to be crying all the time.     There is burning pain in her upper back.     Juan R needs new heartburn medication as ranitidine was recalled. She is experiencing heartburn every single night.     REVIEW OF SYSTEMS:   General: Poor appetite.   GI: Right-sided abdominal pain. Heartburn.   Musculoskeletal: Upper back pain.   Neuro: Depression.   All other 10 point review of systems are negative.    PFSH:  She is working full time. Reviewed as below.     TOBACCO USE:  Social History     Tobacco Use   Smoking Status Never Smoker   Smokeless Tobacco Never Used       VITALS:  Vitals:    02/20/20 1317   BP: 102/70   Pulse: (!) 111   Resp: 20   Temp: 97.8  F (36.6  C)   TempSrc: Oral   SpO2: 98%   Weight: (!) 251 lb 4.8 oz (114 kg)   Height: 5' 3\" (1.6 m)     Wt Readings from Last 3 Encounters:   02/20/20 (!) 251 lb 4.8 oz (114 kg)   11/24/19 220 lb " (99.8 kg)   07/17/19 (!) 249 lb 5 oz (113.1 kg)     Body mass index is 44.52 kg/m .      PHYSICAL EXAM:  General: Alert, cooperative, no distress, appears stated age  Head: Normocephalic, without obvious abnormality, atraumatic, moist mucous membranes  Eyes: PERRL, conjunctiva/cornea clear, EOM's intact  Ears: Normal TM's and external ear canals, both ears  Neck: Supple, no cervical lymph node enlargement   Back: Symmetric, no curvature, tender to palpation over right L4 pedicle  Lungs: Clear to auscultation bilaterally, respirations unlabored  Heart: Regular rate and rhythm, S1 and S2 normal, no murmur, rub, or gallop  Abdomen: Soft, generalized abdominal pain throughout all quadrants, bowel sounds active all four quadrants, no masses, no organomegaly.  Neurologic:  A & O x 3.  No tremor, no focal findings.  Normal gait.   Psychiatric: Normal affect, good eye contact, well-groomed  Skin: No rash or suspicious lesions noted on exposed skin, non-diaphoretic    DATA REVIEWED:  Additional History from Old Records Summarized (2): Reviewed four most ED notes from [facility] regarding abdominal pain and influenza-like illness.  Decision to Obtain Records (1): none  Radiology Tests Summarized or Ordered (1): 11/24/2019 CTA abdomen and pelvic showed no intra-abdominal hemorrhage. There was a small focus ovoid density sigmoid that was unchanged from previous imaging. 11/24/2019 pelvic ultrasound showed normal flow to both ovaries and was otherwise normal.   Labs Reviewed or Ordered (1): ED labs reviewed.  Medicine Test Summarized or Ordered (1): none  Independent Review of EKG or X-RAY(2 each): none    Total time was 15 minutes, greater than 50% counseling and coordinating care regarding the above issues.     I, Claire Julien, am scribing for and in the presence of, Dr. Donaldson.    I, Dr. Donaldson, personally performed the services described in this documentation, as scribed by Claire Julien in my presence, and it is both  accurate and complete.      MEDICATIONS:  Current Outpatient Medications   Medication Sig Dispense Refill     citalopram (CELEXA) 40 MG tablet Take 1 tablet (40 mg total) by mouth daily. 30 tablet 2     diclofenac (VOLTAREN) 75 MG EC tablet Take 1 tablet (75 mg total) by mouth 2 (two) times a day. 30 tablet 0     dicyclomine (BENTYL) 20 mg tablet Take 1 tablet (20 mg total) by mouth 3 (three) times a day as needed. 30 tablet 1     ferrous sulfate 325 (65 FE) MG tablet Take 1 tablet (325 mg total) by mouth 2 (two) times a day. 60 tablet 3     gabapentin (NEURONTIN) 300 MG capsule Take 1 capsule (300 mg total) by mouth 2 (two) times a day. 60 capsule 0     HYDROcodone-acetaminophen 5-325 mg per tablet Take 1 tablet by mouth every 8 (eight) hours as needed for pain. 6 tablet 0     lithium 300 mg tablet Take 1 tablet (300 mg total) by mouth 3 (three) times a day. 90 tablet 2     omeprazole (PRILOSEC) 20 MG capsule Take 1 capsule (20 mg total) by mouth daily. 30 capsule 11     ondansetron (ZOFRAN ODT) 4 MG disintegrating tablet Take 1 tablet (4 mg total) by mouth every 8 (eight) hours as needed 4mg. 10 tablet 0     ranitidine (ZANTAC) 150 MG tablet Take 1 tablet (150 mg total) by mouth 2 (two) times a day. 60 tablet 1     topiramate (TOPAMAX) 25 MG tablet Take 1 tablet (25 mg total) by mouth 2 (two) times a day. 60 tablet 2     No current facility-administered medications for this visit.        Total Data Points: 4    Please note that this clinical encounter uses voice recognition software, there may be typographical errors present

## 2021-06-06 NOTE — TELEPHONE ENCOUNTER
Medication Question or Clarification  Who is calling: Patient  What medication are you calling about (include dose and sig)?:   dicyclomine (BENTYL) 20 mg tablet  30 tablet  1  2/20/2020      Sig - Route: Take 1 tablet (20 mg total) by mouth 3 (three) times a day as needed. - Oral     Sent to pharmacy as: dicyclomine 20 mg tablet (BENTYL)     E-Prescribing Status: Receipt confirmed by pharmacy (2/20/2020  1:36 PM CST        Who prescribed the medication?: Dixon Donaldson MD    What is your question/concern?: The jpatient states that this medication is working, but not quite at 100%, is asking if increasing the dose is an option.  Please advise.  [facility]ed [facility]: [id number]  Okay to leave a detailed message?: Yes

## 2021-06-06 NOTE — PROGRESS NOTES
ASSESSMENT AND PLAN:  1. PTSD (post-traumatic stress disorder)  Denies any nightmares.  Sleep quality is poor she feels tired she is taking the Celexa.  She denies having any flashbacks.  Patient states she is fatigued patient says she is not been eating well she is lost weight she feels tired and worn down.    2. Other fatigue  She denies any shortness of breath chest pain headache.  She says she does not have any energy when she goes home she is been able to work.  Hemogram will be drawn today.  - HM1(CBC and Differential)  - HM1 (CBC with Diff)    3. Abdominal pain, generalized    Left-sided abdominal pain noted in the left upper and lower quadrants respectively bowel sounds are present no rebound tenderness.  No masses detected.  Continue dicyclomine.  Pain could be due to in her terminal cause.  Differentials would include ileus, IBS, pelvic pain anxiety.    4. Nausea and vomiting in adult patient    She feels nauseated with any solids.  She is lost weight.  She feels hungry.  Zofran prescribed  - ondansetron (ZOFRAN ODT) 4 MG disintegrating tablet; Take 1 tablet (4 mg total) by mouth every 8 (eight) hours as needed. 4mg  Dispense: 10 tablet; Refill: 0            Orders Placed This Encounter   Procedures     HM1 (CBC with Diff)     Medications Discontinued During This Encounter   Medication Reason     ondansetron (ZOFRAN ODT) 4 MG disintegrating tablet Reorder       No follow-ups on file.    CHIEF COMPLAINT:  Chief Complaint   Patient presents with     bleeding from rectum     randomly bleeding over the weekend.  Painful when passes bm or gas     Anorexia     causing weakness       HISTORY OF PRESENT ILLNESS:  Juan R is a 25 y.o. female who presents to the clinic today for rectal bleeding. She had one episode of bright red blood in her stools 5-6 days ago. At her last visit, dicyclomine had been started for her abdominal pain and suspected IBS. The patient is taking this three times a day with persistent  "abdominal cramping pain. She reports poor appetite as taking a single bite can make her feel nauseous and shaky. Sometimes the sight of food makes her feel sick. Juan R repeatedly states that she feels very weak. All she wants to do is go to sleep. Work has been very difficult for her. The patient has been drinking a lot of water, and is not tolerating soda well. She notes some rectal pain with passing gas. There has been no current headache, new cough, dysuria, gross hematuria, or increased urinary frequency. Of note, Juan R did have two periods last month. This has never happened to her before as her periods are typically very regular.     She reports to be taking Celexa every day faithfully. The patient states that she has been too weak to even get upset at people.    REVIEW OF SYSTEMS:   General: Generalized weakness.   Respiratory: No cough.  GI: Abdominal cramping pain. Stomach upset. Rectal bleeding.  : Menstrual irregularity. No dysuria, gross, hematuria, or increased urinary frequency.  Neuro: No headache.  All other 10 point review of systems are negative.    PFSH:  She was on [employer] duty last week. She has not smoked for several years. Reviewed as below.     TOBACCO USE:  Social History     Tobacco Use   Smoking Status Former Smoker   Smokeless Tobacco Never Used       VITALS:  Vitals:    03/06/20 1048   BP: 106/70   Pulse: 72   Resp: 18   Temp: 97.5  F (36.4  C)   TempSrc: Oral   Weight: (!) 249 lb 9 oz (113.2 kg)   Height: 5' 3.5\" (1.613 m)     Wt Readings from Last 3 Encounters:   03/06/20 (!) 249 lb 9 oz (113.2 kg)   02/20/20 (!) 251 lb 4.8 oz (114 kg)   11/24/19 220 lb (99.8 kg)     Body mass index is 43.51 kg/m .    QUALITY MEASURES:  The following are part of a depression follow up plan for the patient:  mental health care assessment and mental health care management      PHYSICAL EXAM:  General: Alert, cooperative, no distress, appears stated age  Head: Normocephalic, without obvious abnormality, " atraumatic, moist mucous membranes  Eyes: PERRL, conjunctiva/cornea clear, EOM's intact  Neck: Supple, no cervical lymph node enlargement   Lungs: Clear to auscultation bilaterally, respirations unlabored  Heart: Regular rate and rhythm, S1 and S2 normal, no murmur, rub, or gallop  Abdomen: Soft, tender to palpation over the left upper and lower quadrants, no rebound tenderness, bowel sounds active all four quadrants, no masses, no organomegaly.  Neurologic:  A & O x 3.  No tremor, no focal findings.  Normal gait.   Psychiatric: Normal affect, good eye contact, well-groomed  Skin: No rash or suspicious lesions noted on exposed skin, non-diaphoretic    DATA REVIEWED:  Additional History from Old Records Summarized (2): none  Decision to Obtain Records (1): none  Radiology Tests Summarized or Ordered (1): none  Labs Reviewed or Ordered (1): Labs ordered.  Medicine Test Summarized or Ordered (1): none  Independent Review of EKG or X-RAY(2 each): none    Total time was 27 minutes, greater than 50% counseling and coordinating care regarding the above issues.     IClaire, am scribing for and in the presence of, Dr. Donaldson.    IDr. Donaldson, personally performed the services described in this documentation, as scribed by Claire Julien in my presence, and it is both accurate and complete.      MEDICATIONS:  Current Outpatient Medications   Medication Sig Dispense Refill     citalopram (CELEXA) 40 MG tablet Take 1 tablet (40 mg total) by mouth daily. 30 tablet 2     diclofenac (VOLTAREN) 75 MG EC tablet Take 1 tablet (75 mg total) by mouth 2 (two) times a day. 30 tablet 0     dicyclomine (BENTYL) 20 mg tablet Take 1 tablet (20 mg total) by mouth 3 (three) times a day as needed. 30 tablet 1     ferrous sulfate 325 (65 FE) MG tablet Take 1 tablet (325 mg total) by mouth 2 (two) times a day. 60 tablet 3     gabapentin (NEURONTIN) 300 MG capsule Take 1 capsule (300 mg total) by mouth 2 (two) times a day. 60 capsule 0      HYDROcodone-acetaminophen 5-325 mg per tablet Take 1 tablet by mouth every 8 (eight) hours as needed for pain. 6 tablet 0     lithium 300 mg tablet Take 1 tablet (300 mg total) by mouth 3 (three) times a day. 90 tablet 2     omeprazole (PRILOSEC) 20 MG capsule Take 1 capsule (20 mg total) by mouth daily. 30 capsule 11     ondansetron (ZOFRAN ODT) 4 MG disintegrating tablet Take 1 tablet (4 mg total) by mouth every 8 (eight) hours as needed. 4mg 10 tablet 0     ranitidine (ZANTAC) 150 MG tablet Take 1 tablet (150 mg total) by mouth 2 (two) times a day. 60 tablet 1     topiramate (TOPAMAX) 25 MG tablet Take 1 tablet (25 mg total) by mouth 2 (two) times a day. 60 tablet 2     No current facility-administered medications for this visit.        Total Data Points: 1    Please note that this clinical encounter uses voice recognition software, there may be typographical errors present

## 2021-06-06 NOTE — TELEPHONE ENCOUNTER
Patient calling, ans stats she has been diagnosed with IBS.    She reports she had one day over the weekend with ;    Symptom of  Rectal bleeding over the weekend.  She had 2 Bloody episodes she reports;   First time no stool, just blood, 2nd time blood in Stool.    On medication for IBS    Patient is on Dicyclomine  20 mg tablet for this.     Has an upcoming appointment for PX next week, but patient was prompted to make a sooner appointment to discuss her rectal bleeding. She states she has not had any bleeding since over the weekend.    An appointment was made for patient to be seen on Friday, 3/06/2020 with Dr. Donaldson.    Ivonne Zhu RN  [facility] Triage/refill nurse          Reason for Disposition    MILD rectal bleeding (more than just a few drops or streaks)    Protocols used: RECTAL BLEEDING-A-OH

## 2021-06-06 NOTE — PATIENT INSTRUCTIONS - HE
RUQ and RLQ abdominal pain, and rectal pain:     Referral given to GI.    Start ketorolac (TORADOL) 10 mg tablet; Take 1 tablet (10 mg total) by mouth every 6 (six) hours as needed for pain. Please take this with food.

## 2021-06-06 NOTE — PATIENT INSTRUCTIONS - HE
Nausea:     Start ondansetron (ZOFRAN ODT) 4 MG disintegrating tablet; Take 1 tablet (4 mg total) by mouth every 8 (eight) hours as needed.    Cramping abdominal pain:     Continue dicyclomine.    Menstrual irregularity:     Keep period journal.    Consider referral to GYN.    Fatigue:     Labs ordered. Clinic will call with results.    PSTD:     Continue Celexa.    Continue rest of current medications at current doses. No other changes made today.
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
## 2021-06-07 NOTE — PROGRESS NOTES
"Juan R Uriostegui is a 25 y.o. female who is being evaluated via a billable telephone visit.      The patient has been notified of following:     \"This telephone visit will be conducted via a call between you and your physician/provider. We have found that certain health care needs can be provided without the need for a physical exam.  This service lets us provide the care you need with a short phone conversation.  If a prescription is necessary we can send it directly to your pharmacy.  If lab work is needed we can place an order for that and you can then stop by our lab to have the test done at a later time.    If during the course of the call the physician/provider feels a telephone visit is not appropriate, you will not be charged for this service.\"     Patient has given verbal consent to a Telephone visit? Yes    Juan R Uriostegui is a 25-year-old female here for a telephone encounter because of ongoing right-sided abdominal pain, anal pain.      The patient has had abdominal pain which is located in the right side of her abdomen for more than 8 weeks.  Is been continuous.  She has been evaluated previously for episodes of this pain which is started in November.  She said the pain briefly went away and then recurred and is been present for the time stipulated as mentioned above.  Because the pain is so severe she rates it at a 10 out of 10 she quit her job 2 weeks ago.  Relieving factors include only resting.  She says that the pain radiates down to her anal region and she feels a burning sensation there.  She saw [facility] and had a colonoscopy done on 3/17/2020 she does not know the results of that test.      She says that the dicyclomine is helping and now she can eat and no longer feels nauseated.  She has no cramping at this time.    She has no nausea at this time or emesis at this time.    No other relieving factors are noted with the abdominal pain.      She denies any shortness of " breath, chest pain, rectal bleeding, vaginal bleeding.  Currently she is not sexually active.  She denies any dyspepsia.        I have reviewed and updated the patient's Past Medical History, Social History, Family History and Medication List.    ALLERGIES  Patient has no known allergies.    Assessment/Plan:  1. PTSD (post-traumatic stress disorder)  Taking Celexa sleeping well no nightmares noted increasing gabapentin to  - citalopram (CELEXA) 40 MG tablet; Take 1 tablet (40 mg total) by mouth daily.  Dispense: 30 tablet; Refill: 2    2. Abdominal pain, right lower quadrant  1200 mg a day side effects discussed with patient  - gabapentin (NEURONTIN) 300 MG capsule; Take 1 capsule (300 mg total) by mouth 4 (four) times a day.  Dispense: 120 capsule; Refill: 1    3. Anal or rectal pain  The rectal pain is present continuously is not associated with bowel movements which have now become regular after taking dicyclomine.  - gabapentin (NEURONTIN) 300 MG capsule; Take 1 capsule (300 mg total) by mouth 4 (four) times a day.  Dispense: 120 capsule; Refill: 1    4. Abdominal bloating    The bloating is improved after he taking the dicyclomine    5. Other irritable bowel syndrome    - dicyclomine (BENTYL) 20 mg tablet; Take 1 tablet (20 mg total) by mouth 4 (four) times a day as needed.  Dispense: 100 tablet; Refill: 5    6. Abdominal pain, generalized    - dicyclomine (BENTYL) 20 mg tablet; Take 1 tablet (20 mg total) by mouth 4 (four) times a day as needed.  Dispense: 100 tablet; Refill: 5  - gabapentin (NEURONTIN) 300 MG capsule; Take 1 capsule (300 mg total) by mouth 4 (four) times a day.  Dispense: 120 capsule; Refill: 1        Phone call duration:  11 minutes    Dixon Donaldson MD
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
## 2021-06-07 NOTE — TELEPHONE ENCOUNTER
"  Breasts burning    Denies injuries    LMP - 4/8/2020, normal    Left breast    Has happened, quite a bit, more pronounced today    Has done an exam, \"said everything was normal\"    Left breast symmetrical, not discolored, not warm to touch, painful @ 0200 position (clock facing pt)    No change in laundry detergent, bra    Pt states she has very large breasts    No rash    Pt wishes to be \"seen\" about treatment options to stop burning.     Care advice as noted.     Anne Talbert RN       Reason for Disposition    Patient wants to be seen    Protocols used: BREAST SYMPTOMS-A-OH      "

## 2021-06-07 NOTE — TELEPHONE ENCOUNTER
Drug Change Request  Who is calling?:  Patient   What is the current medication?:     Disp  Refills  Start  End     ketorolac (TORADOL) 10 mg tablet  40 tablet  0  3/10/2020      Sig - Route: Take 1 tablet (10 mg total) by mouth every 6 (six) hours as needed for pain. - Oral     Sent to pharmacy as: ketorolac 10 mg tablet (TORADOL)     E-Prescribing Status: Receipt confirmed by pharmacy (3/10/2020 10:02 AM CDT)       What alternative is being requested?: Unknown  Why the request to change?:  Patient stated that this medication is not helping with the pain. Patient stated that she would like to try something else. Patient stated that her pain is at a 10/10. Patient declined nurse triage and stated that it is not that serious.  Requested [facility]acy?: [facility] #[id number]  Okay to leave a detailed message?:  Yes  [phone]
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
## 2021-06-08 NOTE — PROGRESS NOTES
"Juan R Uriostegui is a 25 y.o. female who is being evaluated via a billable video visit.      The patient has been notified of following:     \"This video visit will be conducted via a call between you and your physician/provider. We have found that certain health care needs can be provided without the need for an in-person physical exam.  This service lets us provide the care you need with a video conversation.  If a prescription is necessary we can send it directly to your pharmacy.  If lab work is needed we can place an order for that and you can then stop by our lab to have the test done at a later time.    Video visits are billed at different rates depending on your insurance coverage. Please reach out to your insurance provider with any questions.    If during the course of the call the physician/provider feels a video visit is not appropriate, you will not be charged for this service.\"    Patient has given verbal consent to a Video visit? Yes    Patient would like to receive their AVS by AVS Preference: Mail a copy.    Patient would like the video invitation sent by: [phone]    Will anyone else be joining your video visit? No    Additional Notes    25-year-old female following up for anal pain, abdominal pain, epigastric burning, PTSD, mood disorder today via video visit.  Last spoke with her on April 15.  Since that time she reports she is seeing gastroenterology I have reviewed those notes.  She states that she continues to have anal pain which is present throughout the day the diltiazem ointment is not helping with the anal discomfort she says the pain comes in waves it is not related to bowel movements.    She describes the pain as being a deep throbbing discomfort which disturbs her sleep she mentions that she also has abdominal pain which is located mainly in the right lower quadrant of her abdomen.  It is not associated with eating although she notes that she cannot eat any food that is been heated " up which causes her burning above her umbilicus.  She states that the nausea and cramps are improved when she uses the dicyclomine and she is requesting a refill of that today.    She has been seen virtually by gastroenterology and they are suggesting that she have pelvic floor dysfunction studies which have yet to be arranged.  She has changed her diet and his avoided avoiding citrus, and dairy.      Because she is not working she feels that her mood is becoming elevated and she is worried about her bipolar disorder she would like to restart the lithium.      She states she has not been getting nightmares is taking the Celexa.  She is not anxious sitting at home.    Video Start Time: 831    Additional provider notes: GENERAL: healthy, alert and no distress  EYES: Eyes grossly normal to inspection, conjunctivae and sclerae normal  RESP: no audible wheeze, cough, or visible cyanosis.  No visible retractions or increased work of breathing.  Able to speak fully in complete sentences.  NEURO: Cranial nerves grossly intact, mentation intact and speech normal  PSYCH: mentation appears normal, affect normal/bright, judgement and insight intact, normal speech and appearance well-groomed        1. Mood disorder (H) I will restart her lithium however I will start her on a titrating dose to avoid nausea and emesis she understands this protocol I will increase her dose of lithium to a maximum of 3 tablets/day over 90. lithium 300 mg tablet   2. Other gastritis without hemorrhage, unspecified chronicity continue omeprazole avoid foods which cause epigastric discomfort    3. Pelvic floor dysfunction in female she will call gastroenterology to make sure that they have set up the appointment for her. lidocaine (XYLOCAINE) 5 % ointment   4. Anal pain I have prescribed lidocaine gel for her to use, she understands that using narcotics at this time would be counterproductive as they do tend to cause constipation and the side effect  profile could cause dependence celecoxib (CELEBREX) 200 MG capsule   5. Irritable bowel syndrome without diarrhea continue Celexa continue dicyclomine    6. PTSD (post-traumatic stress disorder) denies any nightmares at this time    7. Other irritable bowel syndrome  dicyclomine (BENTYL) 20 mg tablet   8. Abdominal pain, generalized I am prescribing Celebrex to help with her generalized abdominal discomfort and anal discomfort dicyclomine (BENTYL) 20 mg tablet    omeprazole (PRILOSEC) 20 MG capsule    celecoxib (CELEBREX) 200 MG capsule       Video-Visit Details    Type of service:  Video Visit    Video End Time (time video stopped): 8:56 AM  Originating Location (pt. Location): Home    Distant Location (provider location):  [facility] MEDICINE/OB      Platform used for Video Visit: Linda Donaldson MD

## 2021-06-08 NOTE — PROGRESS NOTES
"Juan R Uriostegui is a 25 y.o. female who is being evaluated via a billable video visit.      The patient has been notified of following:     \"This video visit will be conducted via a call between you and your physician/provider. We have found that certain health care needs can be provided without the need for an in-person physical exam.  This service lets us provide the care you need with a video conversation.  If a prescription is necessary we can send it directly to your pharmacy.  If lab work is needed we can place an order for that and you can then stop by our lab to have the test done at a later time.    Video visits are billed at different rates depending on your insurance coverage. Please reach out to your insurance provider with any questions.    If during the course of the call the physician/provider feels a video visit is not appropriate, you will not be charged for this service.\"    Patient has given verbal consent to a Video visit? Yes    Patient would like to receive their AVS by AVS Preference: Mail a copy.    Patient would like the video invitation sent by: Text to cell phone: [phone]     Will anyone else be joining your video visit? No  Additional provider notes    Patient is a 25-year-old female with a history of bipolar disorder, irritable bowel syndrome pelvic floor dysfunction with anal pain and abdominal discomfort.    She was seen by virtual visit approximately a week ago.  At that time she was asked to restart her lithium and I provided her Celebrex and lidocaine gel for her abdominal pain and pelvic pain respectively    She was unable to go to the pharmacy and  the medication because the cost was prohibitive.    She reports that she is been having diarrhea daily which is not a new symptom however abdominal pain seems to have increased.  She says the abdominal pain and diarrhea does wake her up from sleep.  She reports abdominal pain caused her to feel nauseated.  She has been " taking Zofran as needed she continues to take her dicyclomine.  She has not heard back from urology.      Video Start Time: 902 AM    Additional provider notes: GENERAL: Healthy, alert and no distress  EYES: Eyes grossly normal to inspection. No discharge or erythema, or obvious scleral/conjunctival abnormalities.  RESP: No audible wheeze, cough, or visible cyanosis.  No visible retractions or increased work of breathing.    NEURO: Cranial nerves grossly intact. Mentation and speech appropriate for age.  PSYCH: Mentation appears normal, affect normal/bright, judgement and insight intact, normal speech and appearance well-groomed       1. Anal pain she needs studies done for pelvic floor dysfunction we will reach out to urology to see when she has been scheduled.  Will call the pharmacy again to obtain the lidocaine for her    2. Pelvic floor dysfunction in female I have suggested that she try Celebrex we will call the pharmacy if the cost is prohibitive I will prescribe meloxicam for her.    3. Irritable bowel syndrome without diarrhea continue on Celexa dose remains 40 mg daily    4. Mood disorder (H) she will  her lithium today          Video-Visit Details    Type of service:  Video Visit    Video End Time (time video stopped): 927  Originating Location (pt. Location): Home    Distant Location (provider location):  [facility] MEDICINE/OB      Platform used for Video Visit: Linda Donaldson MD

## 2021-06-10 ENCOUNTER — OFFICE VISIT - HEALTHEAST (OUTPATIENT)
Dept: PHYSICAL THERAPY | Facility: REHABILITATION | Age: 26
End: 2021-06-10

## 2021-06-10 DIAGNOSIS — Z98.890 HISTORY OF LAPAROSCOPY: ICD-10-CM

## 2021-06-10 DIAGNOSIS — R29.898 MUSCULAR DECONDITIONING: ICD-10-CM

## 2021-06-10 DIAGNOSIS — R19.8 ABDOMINAL WEAKNESS: ICD-10-CM

## 2021-06-10 DIAGNOSIS — R10.9 RIGHT SIDED ABDOMINAL PAIN: ICD-10-CM

## 2021-06-10 DIAGNOSIS — Z90.49 HISTORY OF APPENDECTOMY: ICD-10-CM

## 2021-06-10 DIAGNOSIS — R10.32 LEFT LOWER QUADRANT ABDOMINAL PAIN: ICD-10-CM

## 2021-06-10 NOTE — PROGRESS NOTES
"Juan R Uriostegui is a 25 y.o. female who is being evaluated via a billable telephone visit.      The patient has been notified of following:     \"This telephone visit will be conducted via a call between you and your physician/provider. We have found that certain health care needs can be provided without the need for a physical exam.  This service lets us provide the care you need with a short phone conversation.  If a prescription is necessary we can send it directly to your pharmacy.  If lab work is needed we can place an order for that and you can then stop by our lab to have the test done at a later time.    Telephone visits are billed at different rates depending on your insurance coverage. During this emergency period, for some insurers they may be billed the same as an in-person visit.  Please reach out to your insurance provider with any questions.    If during the course of the call the physician/provider feels a telephone visit is not appropriate, you will not be charged for this service.\"    Patient has given verbal consent to a Telephone visit? Yes    What phone number would you like to be contacted at? [phone]    Patient would like to receive their AVS by AVS Preference: Mail a copy.    Juan R is doing a follow up telephone visit due to abdominal pain.     In the middle of last week, she started having severe lower abdominal pain and went to the [facility] ER. They did a urine test and an abdominal CT scan and did not see appendicitis. They saw she had an ovarian Cyst that had ruptured recently and thought this might have been causing the pain.    However, the pain has continued to worsen. It hurts worse on the right side. She is not eating much because it's so painful. Sitting makes it worse as well. She has had no fevers.     Had a BM today. It was normal, not hard. She has been having daily, normal, soft BMs. \"I've been constipated before and this does not feel like that. This feels like a pain " "I've never had before.\"    She just recently had her period.     Has not been taking any medication. Was told because she has IBS, she shouldn't take ibuprofen.       Assessment/Plan:  1. RLQ abdominal pain: advised that because she is having worsening RLQ abdominal pain, she should go back to the ER. She prefers to go to [facility] this time. I reviewed her results from [facility] ER visit on 7/30- CT showed no evidence of appendicitis but it is possible she has an evolving appendicitis that was not yet apparent at that time (5 days ago). She had a collapsing right corpus luteal cyst and \"minimal physiologic free fluid in the pelvis.\" Explained this would not explain her continued pain. She also did have an elevated WBC count at that visit, to 12.2.     Again, recommended ER visit to further r/o appendicitis.     Phone call duration:  10 minutes    Yuni Garza MD    "

## 2021-06-10 NOTE — PROGRESS NOTES
ASSESSMENT and plan  1. Epiploic appendagitis  Patient continues to have right lower quadrant discomfort.  She did have a CT scan which showed a nodular density close to sigmoid colon we discussed this.  She is taking the cefotetan and doxycycline.  She denies being sexually active we will continue to monitor symptoms she has had this pain which is been cyclical for more than 6 months we will start her on pregabalin for her discomfort she does not want to take any narcotics or anti-inflammatories    2. Pelvic floor dysfunction in female  She continues to have pain in the rectum which is difficult for her to have a bowel movement without discomfort.  She is not constipated at this time will avoid all narcotics to avoid contact the patient    3. Bipolar 1 disorder (H)  Currently she is happy with the way lithium is working for her she is not having any rapid cycling.  We will continue to monitor this for symptoms worsen would consider switching her to Depakote      4.  Irritable bowel syndrome continue dicyclomine the medication works well for her.        There are no Patient Instructions on file for this visit.    No orders of the defined types were placed in this encounter.    There are no discontinued medications.    No follow-ups on file.    CHIEF COMPLAINT:  No chief complaint on file.      HISTORY OF PRESENT ILLNESS:  Juan R is a 25 y.o. female who is here today to follow-up after being hospitalized at [facility] for abdominal pain.  She reports that experience there was difficult because she was given very strong medications on an empty stomach which caused her to be very nauseous.  She continues to have abdominal pain and is taking anti-inflammatories only as needed she thinks that the Bentyl is helping with her IBS and she denies having a fever or chills.  She is her appetite is poor because she is afraid to eat because of rectal discomfort.  She still has not been scheduled for a colonoscopy.  She  "had multiple tests and she states that she does not have a sexually transmitted infection as she is not sexually active at this time she still waiting to hear about those results.    Bipolar disorder is well controlled at this time her family thinks she is doing well.  She had difficulty sleeping because of the discomfort in the right lower quadrants of her abdomen    REVIEW OF SYSTEMS:   General positive for fatigue because of poor sleep  GI positive for right and left lower quadrant pain positive for abdominal cramping positive for rectal pain  Psych denies symptoms  12 point review of  All other systems are negative.    PFSH:  Medical and social history reviewed    TOBACCO USE:  Social History     Tobacco Use   Smoking Status Former Smoker   Smokeless Tobacco Never Used       VITALS:  Vitals:    08/06/20 1655   BP: 100/58   Pulse: 100   Resp: 16   Temp: 97.8  F (36.6  C)   TempSrc: Oral   SpO2: 99%   Weight: (!) 263 lb 5 oz (119.4 kg)   Height: 5' 4\" (1.626 m)     Wt Readings from Last 3 Encounters:   08/06/20 (!) 263 lb 5 oz (119.4 kg)   08/04/20 (!) 261 lb 12.8 oz (118.8 kg)   03/10/20 (!) 251 lb 8 oz (114.1 kg)       PHYSICAL EXAM:  Interactive female seen [facility] exam in no acute distress  HEENT neck supple mucous members moist oral cavity shows no exudate no erythema  Respiratory system clear to auscultation equal breath sounds no wheeze no crackles  CVS regular rate and rhythm no murmurs rubs or gallops appreciated  GI abdomen soft however she does have tenderness in the right lower quadrant and the left lower quadrant of the abdomen no tenesmus noted.  Bowel sounds are present  Psych oriented x3 well-groomed not agitated.  CNS cranial nerves II to XII intact gait is normal reflexes are brisk.    DATA REVIEWED:  Additional History from Old Records Summarized (2): Reviewed admission and discharge notes at [facility].  Findings shared with patient today    Decision to Obtain Records (1): 0  Radiology " Tests Summarized or Ordered (1): CT scan of the abdomen obtained on 8/3/2020 was negative for appendicitis there is a nodular density seen adjacent to the sigmoid colon.  Collapsing cyst noted in the right ovary clear fluid noted in the pelvis ultrasound pelvis obtained on 8/3/2020 shows mild thickening of the endometrial stripe mild fluid noted  Labs Reviewed or Ordered (1): CRP mildly elevated 2.6 clue cells noted on wet prep  Medicine Test Summarized or Ordered (1):   Independent Review of EKG or X-RAY(2 each): 0    The visit lasted a total of 27 minutes face to face with the patient. Over 50% of the time was spent counseling and educating the patient about   Medical issues today abdominal pain.    MEDICATIONS:  Current Outpatient Medications   Medication Sig Dispense Refill     dicyclomine (BENTYL) 20 mg tablet Take 1 tablet (20 mg total) by mouth 4 (four) times a day as needed. (Patient taking differently: Take 20 mg by mouth every 6 (six) hours. ) 100 tablet 5     ibuprofen (ADVIL,MOTRIN) 600 MG tablet Take 1 tablet (600 mg total) by mouth every 6 (six) hours as needed for pain. 30 tablet 0     lithium 300 mg tablet Take 300 mg by mouth 3 (three) times a day.       No current facility-administered medications for this visit.      Shyanne LOZANO

## 2021-06-10 NOTE — TELEPHONE ENCOUNTER
RN Vicenta  Juan R is calling today. She was seen in ER 7/30 for ovarian cyst. Reports she is in excruciating pain. Can't sleep, can't eat, can't sit up. Pain is constant rates 10/10. Making her nauseated. Has follow up visit with Dr. Donaldson on 8/6 but states she has too much pain to wait until then.    I recommended virtual visit today with PCP or another provider. Scheduling to assist.    Sahara Sprague RN  [facility] Nurse Advisor      Additional Information    Negative: Passed out (i.e., fainted, collapsed and was not responding)    Negative: Shock suspected (e.g., cold/pale/clammy skin, too weak to stand, low BP, rapid pulse)    Negative: Sounds like a life-threatening emergency to the triager    Constant abdominal pain lasting > 2 hours    Protocols used: ABDOMINAL PAIN - FEMALE-A-[place]    COVID 19 Nurse Triage Plan/Patient Instructions    Please be aware that novel coronavirus (COVID-19) may be circulating in the community. If you develop symptoms such as fever, cough, or SOB or if you have concerns about the presence of another infection including coronavirus (COVID-19), please contact your health care provider or visit [email or web address].     Disposition/Instructions    Virtual Visit with provider recommended. Reference Visit Selection Guide.    Thank you for taking steps to prevent the spread of this virus.  o Limit your contact with others.  o Wear a simple mask to cover your cough.  o Wash your hands well and often.    Resources    [facility]: About COVID-19: [email or web address]    CDC: What to Do If You're Sick: www.cdc.gov/coronavirus/2019-ncov/about/steps-when-sick.html    CDC: Ending Home Isolation: www.cdc.gov/coronavirus/2019-ncov/hcp/disposition-in-home-patients.html     CDC: Caring for Someone: www.cdc.gov/coronavirus/2019-ncov/if-you-are-sick/care-for-someone.html     [facility]: Interim Guidance for Hospital Discharge to Home:  www.[email or web address].[facility].[email or web address]/diseases/coronavirus/hcp/hospdischarge.pdf    [facility] clinical trials (COVID-19 research studies): [email or web address]/umn-clinical-trials     Below are the COVID-19 hotlines at the [facility] of Health ([facility]). Interpreters are available.   o For health questions: Call 153-488-4077 or 1-906.554.7741 (7 a.m. to 7 p.m.)  o For questions about schools and childcare: Call 180-033-4431 or 1-777.880.8828 (7 a.m. to 7 p.m.)

## 2021-06-10 NOTE — TELEPHONE ENCOUNTER
Who is calling:  Patient   Reason for Call:  Patient stated she went to the ED yesterday and came back home at 6 am this morning. Patient stated she was informed that she has an ovarian cyst rupture. Patient is questioning why it keeps happening and she is in a lot of pain. Patient stated that she is expecting a call back from Dixon Donaldson MD. Patient stated she currently does not have insurance and she is in the process of applying for Medical Assistance through the state.  Date of last appointment with primary care: n/a  Okay to leave a detailed message: Yes  [phone]

## 2021-06-10 NOTE — TELEPHONE ENCOUNTER
RN Triage:     Severe abdominal and anal pain. Started yesterday. She has IBS. Passing stool. NO vomiting no fever. Last BM was yesterday and normal. Right lower abdomen radiating around to her back and down to the rectum.  Pain is a 10/10. Advised ED per RN protocol.     Paty Omalley RN, BSN [facility] Triage Nurse      Reason for Disposition    SEVERE abdominal pain (e.g., excruciating)    Additional Information    Negative: Passed out (i.e., fainted, collapsed and was not responding)    Negative: Shock suspected (e.g., cold/pale/clammy skin, too weak to stand, low BP, rapid pulse)    Negative: Sounds like a life-threatening emergency to the triager    Negative: Chest pain    Negative: Pain is mainly in upper abdomen (if needed ask: 'is it mainly above the belly button?')    Negative: Abdominal pain and pregnant > 20 weeks    Negative: Abdominal pain and pregnant < 20 weeks    Protocols used: ABDOMINAL PAIN - FEMALE-A-OH

## 2021-06-10 NOTE — TELEPHONE ENCOUNTER
Called pt to relay PCP message. Pt was not aware of son's apt on 8/6 at 430 pm.  Pt agreed to see Dr. Donaldson next week.  Apt scheduled. Thanks.

## 2021-06-10 NOTE — TELEPHONE ENCOUNTER
Who is calling:  Patient  Reason for Call:    Patient states the pain is the same.  Patient declines Urgent Care at this time.  Patient is requesting a return call from Provider.  Thank you.  Date of last appointment with primary care: 5/13/2020  Okay to leave a detailed message: Yes

## 2021-06-11 NOTE — PROGRESS NOTES
"Juan R Uriostegui is a 25 y.o. female who is being evaluated via a billable telephone visit.      The patient has been notified of following:     \"This telephone visit will be conducted via a call between you and your physician/provider. We have found that certain health care needs can be provided without the need for a physical exam.  This service lets us provide the care you need with a short phone conversation.  If a prescription is necessary we can send it directly to your pharmacy.  If lab work is needed we can place an order for that and you can then stop by our lab to have the test done at a later time.    Telephone visits are billed at different rates depending on your insurance coverage. During this emergency period, for some insurers they may be billed the same as an in-person visit.  Please reach out to your insurance provider with any questions.    If during the course of the call the physician/provider feels a telephone visit is not appropriate, you will not be charged for this service.\"    Patient has given verbal consent to a Telephone visit? Yes    What phone number would you like to be contacted at? [phone]    Patient would like to receive their AVS by AVS Preference: Mail a copy.    Additional provider notes:     25-year-old female following up today for issues concerning her bipolar disorder, abdominal discomfort, menstrual irregularities and depression.  Patient was scheduled to see [place] [facility] and have a colonoscopy done but was not informed of proper colonoscopy preparation instructions and had her visit rescheduled twice she is very frustrated and has declined to go for colonoscopy at this time she continues to have abdominal pain and bloating and diarrhea.  She denies noticing any blood in her stool.  She states she was unable to obtain the Lyrica because the pharmacy said prescription never arrived and when she went there said they did not have the medication she says the " abdominal pain secondary to her pelvic floor dysfunction has been causing her to have difficulty getting out of bed the pain is a throbbing deep pain.    She also started the lithium and noted that it worked very well for her for several weeks and then began to feel ill and nauseated and then stopped the medication altogether.  She now notes that she feels shaky and anxious and cannot sleep she is also experiencing nightmares.  She has not taken lithium for more than 10 days.  She says because of her mood swings her fiancé is very upset.  She is scheduled to get  in less than 2 months.  She states that she has not been able to eat regularly because she is now feeling nauseated.      Over the last month she is also noted abnormal menstrual cycles which have never occurred before she notes that her cycles of come early.  She does not have any cramping associated with her cycles but the bleeding is still occurring.  She notes no passage of clots.    Assessment/Plan:  1. Bipolar 1 disorder (H)  I have had her restart lithium at a lower dose 300 mg twice a day will have another virtual visit with her in a week.  - pregabalin (LYRICA) 75 MG capsule; Take 1 capsule (75 mg total) by mouth 3 (three) times a day.  Dispense: 90 capsule; Refill: 2    2. Pelvic floor dysfunction in female    She was unable to obtain the Lyrica previously I have phoned the pharmacy today and they do have the prescription side effects discussed she will start this medication  - pregabalin (LYRICA) 75 MG capsule; Take 1 capsule (75 mg total) by mouth 3 (three) times a day.  Dispense: 90 capsule; Refill: 2    Phone start time 4:36 PM  Phone end time 5:06 PM    Phone call duration:  30 minutes    Shyanne LOZANO

## 2021-06-12 NOTE — TELEPHONE ENCOUNTER
Medication Question or Clarification  Who is calling: patient  What medication are you calling about (include dose and sig)?:     pregabalin (LYRICA) 75 MG capsule 90 capsule 2 9/16/2020     Sig - Route: Take 1 capsule (75 mg total) by mouth 3 (three) times a day. - Oral      Who prescribed the medication?: Dixon Donaldson MD  What is your question/concern?: Patient requesting clarification. She doesn't know if she should be taking this medication. Reports she thought she had side effect from taking this medication. Please advise.  [facility] to leave a detailed message?: Yes

## 2021-06-12 NOTE — TELEPHONE ENCOUNTER
Medication Question or Clarification  Who is calling: patient   What medication are you calling about (include dose and sig)?:     lithium 300 mg tablet        Sig - Route: Take 300 mg by mouth 3 (three) times a day. - Oral    Class: Historical Med      Who prescribed the medication?: Dixon Donaldson MD  What is your question/concern?: Patient is requesting a new prescription to go back to taking this medication 3 x daily. Please advise.  Requested [facility] to leave a detailed message?: Yes

## 2021-06-13 NOTE — PROGRESS NOTES
"Juan R Uriostegui is a 25 y.o. female who is being evaluated via a billable telephone visit.      The patient has been notified of following:     \"This telephone visit will be conducted via a call between you and your physician/provider. We have found that certain health care needs can be provided without the need for a physical exam.  This service lets us provide the care you need with a short phone conversation.  If a prescription is necessary we can send it directly to your pharmacy.  If lab work is needed we can place an order for that and you can then stop by our lab to have the test done at a later time.    Telephone visits are billed at different rates depending on your insurance coverage. During this emergency period, for some insurers they may be billed the same as an in-person visit.  Please reach out to your insurance provider with any questions.    If during the course of the call the physician/provider feels a telephone visit is not appropriate, you will not be charged for this service.\"    Patient has given verbal consent to a Telephone visit? Yes    What phone number would you like to be contacted at? [phone]      Patient would like to receive their AVS by declined    Additional provider notes: 25-year-old female with medical history significant for bipolar disorder, epiploic appendagitis, pelvic floor discomfort here for a follow-up on a virtual phone visit today.  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  She has been wearing a mask has not felt any symptoms but 2 of her clients have been ill.    She states that she has many health issues which she wishes to discuss today.  Her bipolar disorder is not well controlled as she is feeling angry and overwhelmed at work and at home she says she " "has been \"and \"snapping at her family members feeling very angry and fighting with her fiancé.  She has not felt depressed.  She says when she had energy she would \"and \"fight all day\" she has been taking her lithium the dose has been increased to 3 Tablets a Day Which She Feels Is Not Working Particularly Well.    She also notes that she has been urinating more frequently at night and has been having accidents these accidents cause her to need to change her close and she notes that when she coughs she has been wetting herself even at work.  This is a new phenomenon is never happened before.  She denies any pain when she urinates.    She also notes that she has been having her period for the majority of the month.  She has been spotting and having heavy bleeding.  She used to be on birth control until last year and had it removed and has had this problem for 2 months.  She denies passing any clots.  She is not currently sexually active.      She also notes that her abdominal pain is still present the Bentyl does help however she notes that she does have abdominal pain in the afternoon her appetite is described as being normal.    She also notes that both her nipples have been tender and have been hurting.  They do not feel numb she notes no changes around the skin of the nipple.  She is been finding them so painful that she cannot wear a bra there is been no discharge from the nipple area there is no tenderness on the outer portion her nipples are in the axillary region.  She has not had a fever or chills.    Assessment/Plan:  1. Other irritable bowel syndrome  Increasing Bentyl to 4 times a day.  She is still having abdominal discomfort because of the maximal dose side effects discussed in detail  - dicyclomine (BENTYL) 20 mg tablet; Take 1 tablet (20 mg total) by mouth 4 (four) times a day as needed.  Dispense: 100 tablet; Refill: 5    2. Abdominal pain, generalized    - dicyclomine (BENTYL) 20 mg tablet; Take 1 " tablet (20 mg total) by mouth 4 (four) times a day as needed.  Dispense: 100 tablet; Refill: 5    3. Bipolar 1 disorder (H)    I will not increase her lithium as it took her significant other time to be transitioned from to tablets to 3 tablets daily.  She noticed diarrhea with this medication in the past I have given her a prescription for Seroquel for adjuvant therapy side effects discussed follow-up in 1 week on a virtual visit  - lithium 300 mg tablet; Take 1 tablet (300 mg total) by mouth 3 (three) times a day.  Dispense: 90 tablet; Refill: 3  - QUEtiapine (SEROQUEL) 25 MG tablet; Take 1 tablet (25 mg total) by mouth at bedtime.  Dispense: 30 tablet; Refill: 1    4. DUB (dysfunctional uterine bleeding)    She is ready to take an oral contraceptive.  I will prescribe 1 for her today she understands that she needs to take it daily    5. Exposure to COVID-19 virus  I am ordering a COVID-19 test for she understands that if she is positive she will need to self quarantine.  Currently she continues to work as they are short staffed at the group home and she has not had any symptoms  - COVID-19 Virus (Coronavirus) Antibody & Titer Reflex; Future    6. Nipple pain    This is a new finding she never had it before she is not sexually active at this time.  She denies any signs or symptoms suggestive of an infection she will have a video visit the next time so we can examine the area.    7. Female stress incontinence  Discussed the side effects of the medication she is having multiple episodes of frequent urination and does not feel like she is emptying her bladder.  - tolterodine (DETROL LA) 4 MG ER capsule; Take 1 capsule (4 mg total) by mouth daily.  Dispense: 30 capsule; Refill: 2    Start time 10:40 AM  End time 11:18 AM    Phone call duration:  38 minutes    Shyanne LOZANO

## 2021-06-13 NOTE — TELEPHONE ENCOUNTER
"Patient asking to speak with PCP regarding her \"severe abdominal pain\" that she rates 10 out of 10 and is constant.  States it started yesterday morning and resolved.  She is again having the pain, on lower right side.  States this happens frequently and PCP knows about this.  Has not taken any OTC medications and prescribed Lyrica is not helping.    Claire Mendoza RN  [facility] Triage Nurse Advisor    Please close encounter when completed.  "

## 2021-06-13 NOTE — TELEPHONE ENCOUNTER
Central PA team  [phone]  Pool: HE PA MED ([id number])          PA has been initiated.       PA form completed and faxed insurance via Cover My Meds     Key:  [id number]     Medication:  Lidocaine 4% ext soln    Insurance:  Blue Plus         Response will be received via fax and may take up to 5-10 business days depending on plan

## 2021-06-13 NOTE — TELEPHONE ENCOUNTER
Prior Authorization Request  Who s requesting:  Pharmacy  Pharmacy Name and Location: [id number] - [place], [place] - [address]  Medication Name: lidocaine (XYLOCAINE) 4 % (40 mg/mL) external solution  Insurance Plan: BC/BS   Insurance Member ID Number:  [id number]  CoverMyMeds Key: [id number]  Informed patient that prior authorizations can take up to 10 business days for response:   No  Okay to leave a detailed message: No

## 2021-06-13 NOTE — TELEPHONE ENCOUNTER
Reason for Call:  Other call back      Detailed comments: pt states she has not heard back from anyone regarding this. Pt states is still having vaginal bleeding and was put on birth control to stop the bleeding but that didn't help. She declined triage again and wants a call back from covering provider to discuss next steps. Pt states she's also having mental health issues.    Phone Number Patient can be reached at:   Cell number on file:    Telephone Information:   Mobile [phone]       Best Time: anytime    Can we leave a detailed message on this number?: Yes    Call taken on 12/22/2020 at 10:28 AM by Bonifacio Hubbard

## 2021-06-13 NOTE — PROGRESS NOTES
"Juan R Uriostegui is a 25 y.o. female who is being evaluated via a billable video visit.      The patient has been notified of following:     \"This video visit will be conducted via a call between you and your physician/provider. We have found that certain health care needs can be provided without the need for an in-person physical exam.  This service lets us provide the care you need with a video conversation.  If a prescription is necessary we can send it directly to your pharmacy.  If lab work is needed we can place an order for that and you can then stop by our lab to have the test done at a later time.    Video visits are billed at different rates depending on your insurance coverage. Please reach out to your insurance provider with any questions.    If during the course of the call the physician/provider feels a video visit is not appropriate, you will not be charged for this service.\"    Patient has given verbal consent to a Video visit? Yes  How would you like to obtain your AVS? Not needed  If dropped by the video visit, the video invitation should be sent to: Text to cell phone: [phone]  Will anyone else be joining your video visit? No    1. Bipolar 1 disorder (H) symptoms are improving.  Continue taking lithium taking Seroquel sleeping well at night.    2. Other irritable bowel syndrome no abdominal pain noted currently dicyclomine (BENTYL) 20 mg tablet   3. Abdominal pain, generalized  dicyclomine (BENTYL) 20 mg tablet   4. Female stress incontinence taking Detrol her symptoms have resolved tolterodine (DETROL LA) 4 MG ER capsule   5. DUB (dysfunctional uterine bleeding) continuing having spotting no clots being passed no abdominal pain norgestimate-ethinyl estradioL (ORTHO TRI-CYCLEN LO, 28,) 0.18/0.215/0.25 mg-25 mcg tablet   6. Epiploic appendagitis     7. Nipple pain prescribing lidocaine for her pain side effects discussed lidocaine (XYLOCAINE) 4 % (40 mg/mL) external solution       Video " "Start Time: 1007    Additional provider notes:   25-year-old female following up on virtual video visit today.  She was seen less than 10 days ago.  At that time she was prescribed Detrol for incontinence she says that that symptom is now well resolved no side effects noted from the medication.  She feels that the Seroquel is helping for her bipolar disorder she is able to sleep throughout the night and finds that she is not as \"\" manic during the day.  She is no longer working overtime.  She reports that she is taking dicyclomine 4 times daily and has no abdominal discomfort.  She has been taking the oral contraceptives and although she still having spotting the severity is decreased and she is no longer passing clots.    She continues to have nipple pain which bothers her and she cannot wear a bra.  She reports that there is no discharge located from the site.  She says this pain is bothering her even at night and keeps her awake.  GENERAL: Healthy, alert and no distress  EYES: Eyes grossly normal to inspection. No discharge or erythema, or obvious scleral/conjunctival abnormalities.  RESP: No audible wheeze, cough, or visible cyanosis.  No visible retractions or increased work of breathing.    SKIN: Visible skin clear. No significant rash, abnormal pigmentation or lesions.  No discharge noted from the nipple area bilaterally  NEURO: Cranial nerves grossly intact. Mentation and speech appropriate for age.  PSYCH: Mentation appears normal, affect normal/bright, judgement and insight intact, normal speech and appearance well-groomed      Video-Visit Details    Type of service:  Video Visit    Video End Time (time video stopped): 1034  Originating Location (pt. Location): Home    Distant Location (provider location):  [facility]     Platform used for Video Visit: Linda Donaldson MD  "

## 2021-06-13 NOTE — TELEPHONE ENCOUNTER
This original message was from 2 weeks ago.  Pt did a virtual visit with PCP and was placed on a OC.  Can PCP or covering provider please respond to message!  Thanks so much.

## 2021-06-14 NOTE — PROGRESS NOTES
Inform patient about lab results her lithium level was low and subtherapeutic she will be raising the dose to 1 300 mg dose 4 times daily

## 2021-06-14 NOTE — TELEPHONE ENCOUNTER
Sorry, Doctor Mendoza said that he spoke with you today. Please disregard the message and the appointment for tomorrow.     Thank You.   Kaleb

## 2021-06-14 NOTE — TELEPHONE ENCOUNTER
I called pt.  Bleeding stopped for a couple weeks, then started again during the third week of pills.  Sometimes heavy, sometimes spotting.  Currently on second week of second package.    Stools alternate between loose and hard.  Has IBS.  Had some blood with a hard stool.  Not taking fiber supplement.    Plan) Provera 10 mg po x 10 days.  Hold OC during that time then resume.    Begin metamucil, which should help stools be moderate consistency.  Possibly bled from a fissure.    dl

## 2021-06-14 NOTE — TELEPHONE ENCOUNTER
Refill Request  Did you contact pharmacy: Yes  Medication name:   Requested Prescriptions     Pending Prescriptions Disp Refills     QUEtiapine (SEROQUEL) 25 MG tablet 30 tablet 1     Sig: Take 1 tablet (25 mg total) by mouth at bedtime.     Who prescribed the medication: DR. YANG   [facility]ed [facility]acy[facility]  Is patient out of medication: UNKNOWN  Patient notified refills processed in 3 business days:  yes  Okay to leave a detailed message: no        Last office visit: 011/17/2020  Last ordered: 11/17/2020  Last lab check:   Next appointment: NONE     Chart reviewed. Please review findings below.     3. Bipolar 1 disorder (H)     I will not increase her lithium as it took her significant other time to be transitioned from to tablets to 3 tablets daily.  She noticed diarrhea with this medication in the past I have given her a prescription for Seroquel for adjuvant therapy side effects discussed follow-up in 1 week on a virtual visit  - lithium 300 mg tablet; Take 1 tablet (300 mg total) by mouth 3 (three) times a day.  Dispense: 90 tablet; Refill: 3  - QUEtiapine (SEROQUEL) 25 MG tablet; Take 1 tablet (25 mg total) by mouth at bedtime.  Dispense: 30 tablet; Refill: 1

## 2021-06-14 NOTE — TELEPHONE ENCOUNTER
We make you an appointment with Dr. Donaldson tomorrow at 10:45 for a office visit regarding your bladder infection.  Iff you can not make it to this appointment please give us a call back.

## 2021-06-14 NOTE — PROGRESS NOTES
"Juan R Uriostegui is a 25 y.o. female who is being evaluated via a billable telephone visit.      What phone number would you like to be contacted at? [phone]  How would you like to obtain your AVS? AVS Preference: Mail a copy.  Assessment & Plan     Bipolar 1 disorder (H)  Patient still taking lithium and Seroquel she has not noticed symptoms since Friday of being very emotional and crying.  She denies feelings of superiority she difficult night is being depressed she has been able to go to work yesterday.  I will check a lithium level today and hemogram.  - Lithium  - HM1(CBC and Differential)    Disorientation  She has been sleeping well she was disorientated on early Saturday morning she has not had that type of behavior again.  Differential would include seizure-like activity substance abuse sleep deprivation side effects from medication  - Comprehensive Metabolic Panel            Dixon Donaldson MD  [facility]     Juan R Uriostegui is 25 y.o. and presents to clinic today for the following health issues follow-up from a crying spell and bipolar disorder questions regarding disorientation earlier in the week.  She has a medical history significant for anxiety disorder depression bipolar disorder and pelvic floor discomfort causing chronic pain she also has a history of irritable bowel syndrome.  Last Friday she had a period of extreme volatility where she was \"\" crying all day\" she denied any thoughts of self-harm.  She was taking all her medication but felt very emotional and down.  In the ED night she was noted to be shaking rather vigorously by her family members she then awoke and was disorientated and although she was awake was using her cell phone and not responding.  She denies having any nightmares or panic attacks.  On Saturday at work she also had a shaking event for approximately 2 to 3 hours she denies any loss of consciousness any falls.  She was able to finish " her shift.  She denies using alcohol or any street drugs.  She denies having any dizziness or vomiting.  On Sunday her period started she was very fatigued she slept at regular hours but returned to work with Monday with no symptoms.  She has never had these symptoms before.  She states she has been taking all her medications including the Celexa throughout.            Review of Systems  General feels more fatigued and was sleeping for the majority of today  CNS positive for shaking episode potential seizure activity  Psych positive for crying spells denies suicidal ideation      Objective       Vitals:  No vitals were obtained today due to virtual visit.    Physical Exam  Not done today because of the virtual phone visit    Total time of visit was 30 minutes 3 minutes were spent in chart preevaluation 24 minutes with the patient 3 minute for documentation    Shyanne LOZANO

## 2021-06-14 NOTE — TELEPHONE ENCOUNTER
Pt called to relay having really bad abdominal pain x a year.  Now it hurts to sit down and when pt urinates, it hurts from stomach to anus.  Usually does not put pressure to urinate.  Bleeding stopped 2 days ago.
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
## 2021-06-14 NOTE — PROGRESS NOTES
Juan R Uriostegui is a 25 y.o. female who is being evaluated via a billable telephone visit.      What phone number would you like to be contacted at? [phone]  How would you like to obtain your AVS? AVS Preference: Abram.      Arianna     Juan R Uriostegui is 25 y.o. and presents to clinic today for the following health issues   HPI   25-year-old female following up for IBS, pelvic floor dysfunction, bipolar disorder mood disorder and incontinence.  She reports that she is picked up the Celexa the day after her last virtual visit.  She reports that she thinks it may be helping with her abdominal pain.  She thinks it may be helping with her mood she has not had any panic attacks she is sleeping somewhat better.  Over the last 5 days she has noticed that the frequency of urination is increased.  She is having more accidents.    No recent diarrhea reported.  She has been able to go to work.  1. Frequency of urination and polyuria increased frequency of urination no dysuria noted urinalysis to be done today she will come into clinic.  I will inform her results tomorrow morning Urinalysis-UC if Indicated   2. Female stress incontinence taking Detrol accidents still noted.  Occasional leakage of urine    3. Bipolar 1 disorder (H) on lithium symptoms are stable    4. Other irritable bowel syndrome on dicyclomine started Celexa no changes noted yet    5. Abdominal pain, generalized     6. Mood disorder (H) on Celexa    7. Irritable bowel syndrome with both constipation and diarrhea now on Celexa IBS remains the same    8. Pelvic floor dysfunction in female           Review of Systems  Psych no panic attacks noted.  No agitation noted   increased frequency of urination without burning noted over the last 4 days    GI abdominal discomfort noted in the right lower quadrant appetites normal  Objective       Vitals:  No vitals were obtained today due to virtual visit.    Physical Exam  No physical exam as this is a  virtual visit            Phone call duration: 22 minutes  Phone start time 2:37 PM phone end time 2:59 PM 4 minutes spent in chart evaluation 60 minutes spent with the patient to minutes spent in documentation    Shyanne LOZANO

## 2021-06-15 NOTE — ANESTHESIA CARE TRANSFER NOTE
Patient spontaneously breathing.  Tidal volumes > 400ml.  Following commands, suctioned and extubated with balloon down.  O2 via mask at 6L.  To PACU, VSS, SBAR report to RN per institutional handoff policy and procedure.  Transfer of care.    Last vitals:   Vitals:    02/15/21 1407   BP: 112/56   Pulse: 66   Resp: 20   Temp: 36.8  C (98.3  F)   SpO2: 100%     Patient's level of consciousness is drowsy  Spontaneous respirations: yes  Maintains airway independently: yes  Dentition unchanged: yes  Oropharynx: oropharynx clear of all foreign objects    QCDR Measures:  ASA# 20 - Surgical Safety Checklist: WHO surgical safety checklist completed prior to induction    PQRS# 430 - Adult PONV Prevention: 4558F - Pt received => 2 anti-emetic agents (different classes) preop & intraop  ASA# 8 - Peds PONV Prevention: NA - Not pediatric patient, not GA or 2 or more risk factors NOT present  PQRS# 424 - Maddie-op Temp Management: 4559F - At least one body temp DOCUMENTED => 35.5C or 95.9F within required timeframe  PQRS# 426 - PACU Transfer Protocol: - Transfer of care checklist used  ASA# 14 - Acute Post-op Pain: ASA14A - Patient experienced pain >= 7 out of 10

## 2021-06-15 NOTE — TELEPHONE ENCOUNTER
Pt is s/p laparoscopic appendectomy on 2/15/2021. She left a message on my line stating she was having severe, burning left abdominal pain and noticed a large amount of blood in the toilet after having a bowel movement today. I called her back and she was on her way to the ED at [facility].    She denies any history of hemorrhoids. She reports pain in her rectum, along with her left lower abdomen. I will forward this to her surgeon, Dr. Kaur who is also our acute care service surgeon this week.

## 2021-06-15 NOTE — TELEPHONE ENCOUNTER
Last office visit: 02/07/2021  Last ordered: 02/07/2021  oxyCODONE (ROXICODONE) 5 MG immediate release tablet Take 1 tablet (5 mg total) by mouth every 6 (six) hours as needed for pain.  Qty: 10 tablet, Refills: 0     Associated Diagnoses: RLQ abdominal pain   Last lab check: 02/07/2021  Next appointment: Gen. Surgery 02/09/2021 @ 11:20 AM     Chart reviewed. Please review findings below.     Medical Decision Making & Final Diagnosis      1. Epiploic appendagitis    2. RLQ abdominal pain

## 2021-06-15 NOTE — ANESTHESIA PREPROCEDURE EVALUATION
Anesthesia Evaluation      No history of anesthetic complications     Airway   Mallampati: II  Neck ROM: full  Comment: Narrow palate    Pulmonary     breath sounds clear to auscultation  (+) asthma                           Cardiovascular - negative ROS  Exercise tolerance: > or = 4 METS  Rhythm: regular        Neuro/Psych    (+) depression, anxiety/panic attacks, chronic pain (abdominal pain associated with appendagitis - taking percocet q4-6hrs)    Comments: PTSD  Chronic migraine HA    Endo/Other    (+) obesity (BMI 43),      GI/Hepatic/Renal    (-) GERD    Comments: S/f lap appy for excision of epiplotic appendagitis     Chronic nausea      Other findings: Covid negative 2/11/21      Dental - normal exam                        Anesthesia Plan  Planned anesthetic: general endotracheal  GETA - glidescope  Ketamine 25mg post induction, Toradol 15mg at case closure  Decadron 10, Zofran 4  ASA 3   Induction: intravenous   Anesthetic plan and risks discussed with: patient  Anesthesia plan special considerations: antiemetics,   Post-op plan: routine recovery

## 2021-06-15 NOTE — PROGRESS NOTES
[address]  [facility] [address]  Dept: [phone]  Dept Fax: [phone]  Primary Provider: Alcira Yang MD  Pre-op Performing Provider: ALCIRA YANG    PREOPERATIVE EVALUATION:  Today's date: 2/11/2021    Juan R Uriostegui is a 26 y.o. female who presents for a preoperative evaluation.    Surgical Information:  Surgery/Procedure: Stomach surgery/laproscopic treatment epiploic appendagitis  Surgery Location: [facility]  Surgeon: Dr. Kaur  Surgery Date: 02/15/2021  Time of Surgery: Unknown  Where patient plans to recover: At home alone  Fax number for surgical facility: Note does not need to be faxed, will be available electronically in Epic.    Type of Anesthesia Anticipated: General    Assessment & Plan      The proposed surgical procedure is considered LOW risk.    Epiploic appendagitis  She is using oxycodone 2 tablets at a time every 4-6 hours 2 doses at night to help her sleep she had to leave work early today.  I have refilled the medication she understands this medication can cause dependence, constipation dizziness and somnolence.  - oxyCODONE-acetaminophen (PERCOCET/ENDOCET) 5-325 mg per tablet  Dispense: 40 tablet; Refill: 0    Preop general physical exam  Patient understands pre and perioperative guidelines.  She can proceed to the surgery         Risks and Recommendations:  The patient has the following additional risks and recommendations for perioperative complications:   - Morbid obesity (BMI >40)        RECOMMENDATION:  APPROVAL GIVEN to proceed with proposed procedure, without further diagnostic evaluation.    Review of external notes as documented above   [id number]}    Subjective     HPI related to upcoming procedure: 26-year-old female here for preop for laparoscopic surgery with Dr. Kaur scheduled on Monday, February 15 for epiploic appendagitis.  She has had multiple ER visits for abdominal pain.  She has medical history significant for bipolar disease,  morbid obesity, depression, IBS.  Patient's been using Percocet in an effort to help with the pain medication works for 2 to 3 hours she has been able to use total of 4 tablets a day and this allows her to sleep 6 hours.  She has constant right lower quadrant discomfort radiates to her back.  She requires a work note today and will see her surgeon regarding her return to work clarification after the surgery    Preop Questions 2/11/2021   Have you ever had a heart attack or stroke? No   Have you ever had surgery on your heart or blood vessels, such as a stent placement, a coronary artery bypass, or surgery on an artery in your head, neck, heart, or legs? No   Do you have chest pain with activity? No   Do you have a history of  heart failure? No   Do you currently have a cold, bronchitis or symptoms of other infection? No   Do you have a cough, shortness of breath, or wheezing? No   Do you or anyone in your family have previous history of blood clots? No   Do you or does anyone in your family have a serious bleeding problem such as prolonged bleeding following surgeries or cuts? No   Have you ever had problems with anemia or been told to take iron pills? No   Have you had any abnormal blood loss such as black, tarry or bloody stools, or abnormal vaginal bleeding? No   Have you ever had a blood transfusion? No   Are you willing to have a blood transfusion if it is medically needed before, during, or after your surgery? Yes   Have you or any of your relatives ever had problems with anesthesia? No   Do you have sleep apnea, excessive snoring or daytime drowsiness? No   Do you have any artifical heart valves or other implanted medical devices like a pacemaker, defibrillator, or continuous glucose monitor? No   Do you have artificial joints? No   Are you allergic to latex? No   Is there any chance that you may be pregnant? No     Health Care Directive:  Patient does not have a Health Care Directive or Living Will:  Discussed advance care planning with patient; information given to patient to review.   :[id number]}  DEPRESSION - Patient has a long history of Depression of moderate severity requiring medication for control with recent symptoms being stable..Current symptoms of depression include none.       Review of Systems  CONSTITUTIONAL: NEGATIVE for fever, chills, change in weight  INTEGUMENTARY/SKIN: NEGATIVE for worrisome rashes, moles or lesions  EYES: NEGATIVE for vision changes or irritation  ENT/MOUTH: NEGATIVE for ear, mouth and throat problems  RESP: NEGATIVE for significant cough or SOB  BREAST: NEGATIVE for masses, tenderness or discharge  CV: NEGATIVE for chest pain, palpitations or peripheral edema  GI: abdominal pain RUQ and RLQ  : NEGATIVE for frequency, dysuria, or hematuria  MUSCULOSKELETAL: NEGATIVE for significant arthralgias or myalgia  NEURO: NEGATIVE for weakness, dizziness or paresthesias  ENDOCRINE: NEGATIVE for temperature intolerance, skin/hair changes  HEME: NEGATIVE for bleeding problems  PSYCHIATRIC: NEGATIVE for changes in mood or affect    Patient Active Problem List    Diagnosis Date Noted     IBS (irritable bowel syndrome)      Morbid obesity (H) 11/17/2020     Bipolar 1 disorder (H)      Epiploic appendagitis 08/05/2020     Microcytic anemia 08/04/2020     Irritable bowel syndrome without diarrhea 05/08/2020     Pelvic floor dysfunction in female 05/08/2020     Anal pain 05/08/2020     Other gastritis without hemorrhage, unspecified chronicity 03/19/2019     Pain of right hand 03/19/2019     Chronic migraine without aura without status migrainosus, not intractable 02/27/2019     Mood disorder (H) 01/31/2019     PTSD (post-traumatic stress disorder) 11/28/2018     Encounter for insertion of mirena IUD 10/15/2018     Past Medical History:   Diagnosis Date     Asthma      Bipolar 1 disorder (H)      Depression      IBS (irritable bowel syndrome)      Obesity      Past Surgical History:    Procedure Laterality Date     TONSILLECTOMY       Current Outpatient Medications   Medication Sig Dispense Refill     citalopram (CELEXA) 20 MG tablet Take 1 tablet (20 mg total) by mouth daily. 30 tablet 2     dicyclomine (BENTYL) 20 mg tablet Take 1 tablet (20 mg total) by mouth 4 (four) times a day as needed. 100 tablet 5     lithium 300 mg tablet Take 1 tablet (300 mg total) by mouth 4 (four) times a day. 120 tablet 1     medroxyPROGESTERone (PROVERA) 10 MG tablet Take 1 tablet (10 mg total) by mouth daily. 10 tablet 0     norgestimate-ethinyl estradioL (ORTHO TRI-CYCLEN LO, 28,) 0.18/0.215/0.25 mg-25 mcg tablet Take 1 tablet by mouth daily. 30 tablet 11     ondansetron (ZOFRAN ODT) 4 MG disintegrating tablet Take 1 tablet (4 mg total) by mouth every 8 (eight) hours as needed. 12 tablet 0     oxyCODONE (ROXICODONE) 5 MG immediate release tablet Take 1-2 tablets (5-10 mg total) by mouth every 6 (six) hours as needed for pain. 12 tablet 0     oxyCODONE-acetaminophen (PERCOCET/ENDOCET) 5-325 mg per tablet Take 1 tablet by mouth every 6 (six) hours as needed for pain. 8 tablet 0     pregabalin (LYRICA) 75 MG capsule Take 1 capsule (75 mg total) by mouth 3 (three) times a day. 90 capsule 2     psyllium husk, with sugar, (METAMUCIL, WITH SUGAR,) 3.4 gram/7 gram Powd 1 tablespoon in 8 oz water daily 538 g 11     QUEtiapine (SEROQUEL) 25 MG tablet Take 1 tablet (25 mg total) by mouth at bedtime. 30 tablet 1     tolterodine (DETROL LA) 4 MG ER capsule Take 1 capsule (4 mg total) by mouth daily. 30 capsule 2     No current facility-administered medications for this visit.        No Known Allergies    Social History     Tobacco Use     Smoking status: Never Smoker     Smokeless tobacco: Never Used   Substance Use Topics     Alcohol use: No      No family history on file.  Social History     Substance and Sexual Activity   Drug Use Never        Objective     /62   Pulse 84   Temp 98.1  F (36.7  C) (Oral)   Resp  "16   Ht 5' 4\" (1.626 m)   Wt (!) 250 lb (113.4 kg)   LMP  (LMP Unknown)   BMI 42.91 kg/m    Physical Exam    GENERAL APPEARANCE: healthy, alert and no distress     EYES: EOMI, PERRL     HENT: ear canals and TM's normal and nose and mouth without ulcers or lesions     NECK: no adenopathy, no asymmetry, masses, or scars and thyroid normal to palpation     RESP: lungs clear to auscultation - no rales, rhonchi or wheezes     BREAST: normal without masses, tenderness or nipple discharge and no palpable axillary masses or adenopathy     CV: regular rates and rhythm, normal S1 S2, no S3 or S4 and no murmur, click or rub     ABDOMEN: bowel sounds normal, no bruits heard, no palpable masses and spleen non-palpable     MS: extremities normal- no gross deformities noted, no evidence of inflammation in joints, FROM in all extremities.     SKIN: no suspicious lesions or rashes     NEURO: Normal strength and tone, sensory exam grossly normal, mentation intact and speech normal     PSYCH: mentation appears normal. and affect normal/bright     LYMPHATICS: No cervical adenopathy    Recent Labs   Lab Test 02/07/21  1307 01/19/21  1628   HGB 11.3* 11.3*    351    139   K 3.5 3.7   CREATININE 0.74 0.80        PRE-OP Diagnostics:   No labs were ordered during this visit except for COVID-19 PCR  No EKG required for low risk surgery (cataract, skin procedure, breast biopsy, etc).    REVISED CARDIAC RISK INDEX (RCRI)   The patient has the following serious cardiovascular risks for perioperative complications:   - No serious cardiac risks = 0 points    RCRI INTERPRETATION: 0 points: Class I (very low risk - 0.4% complication rate)           Signed Electronically by: Dixon Donaldson MD    Copy of this evaluation report is provided to requesting physician.    [facility] Preop Guidelines    Revised Cardiac Risk Index  "

## 2021-06-15 NOTE — PROGRESS NOTES
"Juan R Uriostegui is a 26 y.o. female who is being evaluated via a billable telephone visit.      What phone number would you like to be contacted at? [phone]  How would you like to obtain your AVS? AVS Preference: [facility].    Assessment & Plan     Other irritable bowel syndrome  I refilled her dicyclomine.  She has not noticed any side effects with this medication  - dicyclomine (BENTYL) 20 mg tablet  Dispense: 100 tablet; Refill: 5  - traZODone (DESYREL) 100 MG tablet  Dispense: 30 tablet; Refill: 3    Abdominal pain, generalized  She is able to eat regularly with this medication no nausea noted she should continue taking her Lyrica.  - dicyclomine (BENTYL) 20 mg tablet  Dispense: 100 tablet; Refill: 5    Abdominal pain, left upper quadrant  She is having pain over the incision site I have prescribed lidocaine to be applied to the area she will follow up in clinic because of the ongoing pain  - lidocaine (XYLOCAINE) 5 % ointment  Dispense: 120 g; Refill: 4    Other insomnia  Has difficulty sleeping was requesting a narcotic to help with the pain and to help her sleep I will prescribe trazodone for her.  Side effects of medication discussed.  BMI:   Estimated body mass index is 42.91 kg/m  as calculated from the following:    Height as of 2/23/21: 5' 4\" (1.626 m).    Weight as of 2/23/21: 250 lb (113.4 kg).           Dixon Donaldson MD  [facility]   Juan R Uriostegui is 26 y.o. and presents today for the following health issues she has ongoing abdominal pain which is generalized and now she is complaining of pain over the surgical incision site following her epiploic appendectomy.  She says this pain is present all the time it is a burning pain and it makes it difficult for her to bend to tie her shoes put on her clothing or walk.  She says because of this pain she has been unable to go to work.  She states that she has been taking all of her regular medication including her " Lyrica.  She also states that because of the pain is been very difficult for her to sleep and she needs to have something for that.  She says Percocet helped in the past because it reduced her pain and allowed her to sleep.  She denies any diarrhea.  She also requires a refill of her dicyclomine.  She states that she has not noticed any rectal bleeding or rectal pain at this time.        Review of Systems  General positive for fatigue because of poor sleep  GI positive for generalized abdominal pain and cramping positive for pain on the left upper and lower quadrants of the abdomen which is burning in nature  Psych positive for decreased sleep        Objective       Vitals:  No vitals were obtained today due to virtual visit.    Physical Exam  Not done as this was a virtual visit            Phone call duration: 30 minutes     Shyanne LOZANO

## 2021-06-15 NOTE — PROGRESS NOTES
Hospital Follow-up Visit:    Assessment/Plan:     1. Abdominal burning sensation in right upper quadrant  Patient continues to have burning sensation in her right upper quadrant of the abdomen she also has rectal burning now.  Lab test discussed from her recent ER visit including CT scan that was reassuring.  Prilosec and Carafate to be started side effects discussed follow-up virtually planned in 5 days  - omeprazole (PRILOSEC) 20 MG capsule; Take 1 capsule (20 mg total) by mouth daily before breakfast.  Dispense: 30 capsule; Refill: 11  - sucralfate (CARAFATE) 1 gram tablet; Take 1 tablet (1 g total) by mouth 4 (four) times a day for 10 days.  Dispense: 40 tablet; Refill: 0        Subjective:     Juan R Uriostegui is a 26 y.o. female who presents for a hospital discharge follow up.      Hospital/Nursing Home/ Rehab Facility: [facility]  Date of Admission: 2/15/2021  Date of Discharge:2/15/2021  Reason(s) for Admission: Epiploic appendicitis            Do you have any problems taking your medication regularly?  None       Have you had any changes in your medication since discharge? None       Have you had any difficulty following your discharge or treatment plan?  No    Summary of hospitalization:  Hospital discharge summary reviewed  Diagnostic Tests/Treatments reviewed.  Follow up needed: None  Other Healthcare Providers Involved in Patient's Care: Patient Care Team:  Dixon Donaldson MD as PCP - General (Family Medicine)  Dixon Donaldson MD as Assigned PCP      Update since discharge: {improved   Information from family, SNF, care coordination:      Post Discharge Medication Reconciliation: discharge medications reconciled, continue medications without change  Plan of care communicated with: patient    Objective:     There were no vitals filed for this visit.      Physical Exam:  Not conducted as this was a virtual visit      Coding guidelines for this visit:  Type of Medical   Decision Making Face-to-Face  Visit       within 7 Days of discharge Face-to-Face Visit        within 14 days of discharge   Moderate Complexity [id number]   High Complexity [id number]       Electronically signed by Dixon Donaldson MD 02/25/21 9:36 AM

## 2021-06-15 NOTE — TELEPHONE ENCOUNTER
Patient notified per MD note below. The patient verbalizes understanding of provider/CSS instructions for follow-up and continued care per provider message. The patient will take old medication with her to her pharmacy to ask if they will accept and dispose for her.

## 2021-06-15 NOTE — TELEPHONE ENCOUNTER
Reason for Call:  Other prescription     Detailed comments: Pt was seen in ED last night and was prescribed oxyCODONE (ROXICODONE) 5 MG immediate release tablet. Pt said she's scheduled for surgery consult on 2/9. Pt declined triage and appt. Stating pain level is 10/10 and does not want to go back to ER. Pt wanting higher dosage of pain med. Please advise.    Phone Number Patient can be reached at: Cell number on file:    Telephone Information:   Mobile [phone]       Best Time: anytime    Can we leave a detailed message on this number?: Yes    Call taken on 2/8/2021 at 1:34 PM by Bonifacio Hubbard

## 2021-06-15 NOTE — PATIENT INSTRUCTIONS - HE
Please do not eat or drink after midnight on the day of the surgery.    Please do not use any anti-inflammatories from now to the day of the surgery this includes Advil, Aleve, ibuprofen, aspirin or naproxen.      If the pain from your abdomen becomes worse go to the ER immediately

## 2021-06-15 NOTE — TELEPHONE ENCOUNTER
I did send a new prescription increasing the oxycodone to 1 to 2 tablets every 6 hours as needed for pain.  Very important that she keeps the appointment with general surgery tomorrow and then should also ask them about pain control going forward.  Please remind the patient that she cannot drive after taking this medication.

## 2021-06-15 NOTE — PROGRESS NOTES
"TCM DISCHARGE FOLLOW UP CALL      \"Hi, my name is  Felix Jones, and I am calling from [facility].  I am calling to follow up and see how things are going for you after your recent hospitalization.\"    \"Tell me how you are doing now that you are home?\" \"I am doing a little better after discharged from the ED but I am still having burning pain in my stomach and I am taking anti-pain medication as directed\".      Discharge Instructions    \"Do you understand your discharge instructions?  Pt. Response: Yes.      \"Has an appointment with your primary care provider been scheduled?\" No.  \"If yes, when is that appointment? \"CHW assisted.  If no, date of appointment scheduled: 02/25/2021 at 9:00 AM virtually.   \"I can assist you to schedule that now.\"  CC refer to AVS to schedule when instructed.     Medications    \"Did you have any medication changes?\"  No.  \"Do you have any concerns with obtaining the medications or questions about your medications that you would like a RN to review with you?\" No.  **If yes, escalate to CC RN responsible for patient's clinic or [facility] RN\" Send an inbasket Epic message if RN is unavailable after call.     \"When you see the provider, I would recommend that you bring your medications with you.\"    Call Summary    \"What questions or concerns do you have about your recent visit and your follow-up care?\"None.             Can be addressed if scheduled with RN or SW either Care Coordination or if declining to triage for further questions.         \"If you have questions or things don't continue to improve, we encourage you contact us through the main clinic number [phone] (give number).  Even if the clinic is not open, triage nurses are available 24/7 to help you.          \"I am with Clinic Care Coordination, and we are a team of nurses, social workers, community health workers, and financial resource workers. We work together with your primary doctor.   We are here to see if " "we can help you with a variety of things - from resources in the community such as food assistance, transportation, help in the home, equipment needs, assistance with medications, coordination of your appointments, help with any medical questions, and things like this.\"    \"We would have a nurse or  speak with you and together come up with goals to help you to improve your health and wellness and do the best to help you stay out of the hospital.\"         If the patient agrees, then explain that the appointment can be in person (If has RN or SW in clinic) or on the telephone if remote or if easier.     \"I would like to assist to help you to make that appointment now.  The next available appointment is...... .  \"    Appointment for Care Coordination assessment has been made with NA on (Date) at NA (time)    Send in basket to CCC RN at clinic or in hub to put in order.     "

## 2021-06-15 NOTE — PROGRESS NOTES
General Surgery Consultation    Consulting Provider: Mary Tolbert    CC: abdominal pain, epiploic appendagitis    History:   Juan R Uriostegui is a 26 y.o. female with h/o asthma, bipolar 1 disorder, IBS, obesity referred to see me for recurrent abdominal pain and epiploic appendagitis. Pt states she has had recurrent right lower quadrant pain about 7 times since 2019. Pain comes on suddenly, is sharp, severe and associated with nausea. She has been diagnosed with epiploic appendagitis on CT scan in the past and she states the pain usually takes 1-2 weeks to resolve. It is very severe and she has gone to the ED several times for the pain and needed to take narcotic pain medicine to manage it. Her most recent episode started on Saturday. She went to the ED on Sunday where her CT scan showed a epiploic appendagitis involving pericolonic tissue of the sigmoid colon. She had no leukocytosis or sepsis and her pain improved with pain medication so she was discharged home. She has never had abdominal surgery.     Allergies:  Patient has no known allergies.    Past medical history:  Past Medical History:   Diagnosis Date     Asthma      Bipolar 1 disorder (H)      Depression      IBS (irritable bowel syndrome)      Obesity        Past surgical history:  Past Surgical History:   Procedure Laterality Date     TONSILLECTOMY         Medications:    Current Outpatient Medications:      citalopram (CELEXA) 20 MG tablet, Take 1 tablet (20 mg total) by mouth daily., Disp: 30 tablet, Rfl: 2     dicyclomine (BENTYL) 20 mg tablet, Take 1 tablet (20 mg total) by mouth 4 (four) times a day as needed., Disp: 100 tablet, Rfl: 5     ibuprofen (ADVIL,MOTRIN) 600 MG tablet, Take 1 tablet (600 mg total) by mouth every 6 (six) hours as needed for pain., Disp: 30 tablet, Rfl: 0     lidocaine (XYLOCAINE) 4 % (40 mg/mL) external solution, Apply 4 mL topically as needed., Disp: 50 mL, Rfl: 1     lithium 300 mg tablet, Take 1 tablet (300 mg  total) by mouth 4 (four) times a day., Disp: 120 tablet, Rfl: 1     medroxyPROGESTERone (PROVERA) 10 MG tablet, Take 1 tablet (10 mg total) by mouth daily., Disp: 10 tablet, Rfl: 0     norgestimate-ethinyl estradioL (ORTHO TRI-CYCLEN LO, 28,) 0.18/0.215/0.25 mg-25 mcg tablet, Take 1 tablet by mouth daily., Disp: 30 tablet, Rfl: 11     ondansetron (ZOFRAN ODT) 4 MG disintegrating tablet, Take 1 tablet (4 mg total) by mouth every 8 (eight) hours as needed., Disp: 12 tablet, Rfl: 0     oxyCODONE (ROXICODONE) 5 MG immediate release tablet, Take 1-2 tablets (5-10 mg total) by mouth every 6 (six) hours as needed for pain., Disp: 12 tablet, Rfl: 0     pregabalin (LYRICA) 75 MG capsule, Take 1 capsule (75 mg total) by mouth 3 (three) times a day., Disp: 90 capsule, Rfl: 2     psyllium husk, with sugar, (METAMUCIL, WITH SUGAR,) 3.4 gram/7 gram Powd, 1 tablespoon in 8 oz water daily, Disp: 538 g, Rfl: 11     QUEtiapine (SEROQUEL) 25 MG tablet, Take 1 tablet (25 mg total) by mouth at bedtime., Disp: 30 tablet, Rfl: 1     tolterodine (DETROL LA) 4 MG ER capsule, Take 1 capsule (4 mg total) by mouth daily., Disp: 30 capsule, Rfl: 2    Family history:  No family history on file.    Social history:   reports that she has quit smoking. She has never used smokeless tobacco. She reports that she does not drink alcohol or use drugs.    Review of Systems:  General: no weight loss  Skin: no rashes  Hematologic/Lymphatic: No symptoms or complaints  Psychiatric: chronic BPD, anxiety  Endocrine:  no hypermetabolic symptoms reported  Respiratory: No cough, shortness of breath, or wheezing  Cardiovascular: No chest pain or dyspnea on exertion  Gastrointestinal: see HPI  Musculoskeletal: No recent injuries reported  Neurological: No focal neurologic defects reported    Exam:  /72 (Patient Site: Right Arm, Patient Position: Sitting, Cuff Size: Adult Regular)   Wt (!) 250 lb (113.4 kg)   BMI 42.91 kg/m    Body mass index is 42.91  kg/m .  General : Alert, cooperative, appears stated age   Skin: Skin color, texture, turgor normal  Lymphatic: No obvious adenopathy, no swelling   Eyes: No scleral icterus, pupils equal  HENT: No traumatic injury to the head or face, no gross abnormalities  Lungs: Normal respiratory effort, breath sounds equal bilaterally  Heart: Regular rate and rhythm  Abdomen: soft, obese, nondistended, tender to palpation lower abdomen and RLQ, voluntary guarding  Musculoskeletal: No obvious swelling  Neurologic: Grossly intact    Labs:  Lab Results   Component Value Date    WBC 11.0 02/07/2021    HGB 11.3 (L) 02/07/2021    HCT 37.1 02/07/2021    MCV 79 (L) 02/07/2021     02/07/2021     Results from last 7 days   Lab Units 02/07/21  1307   LN-SODIUM mmol/L 139   LN-POTASSIUM mmol/L 3.5   LN-CHLORIDE mmol/L 108*   LN-CO2 mmol/L 24   LN-BLOOD UREA NITROGEN mg/dL 8   LN-CREATININE mg/dL 0.74   LN-CALCIUM mg/dL 8.6     Lab Results   Component Value Date    ALT 13 02/07/2021    AST 14 02/07/2021    ALKPHOS 79 02/07/2021    BILITOT 0.2 02/07/2021     Imaging:   Pertinent images personally reviewed by myself and discussed with the patient.  Radiology reports:  CT A/P 2/7/21-IMPRESSION:   1.  The appendix is normal.  2.  There is a focus of pericolonic soft tissue stranding at midline of the pelvis involving sigmoid colon most consistent with a focus of epiploic appendagitis.  3.  Involuting corpus luteum right ovary.    CT A/P 8/3/20  IMPRESSION:   1.  Since the previous exam of 07/31/2020, there has been interval development of some nonspecific inflammatory stranding and edema in the fat of the lower pelvis anteriorly. The appendix is in the region, however, the appendix is normal in caliber with   some air in it and is negative for evidence of appendicitis. There is an ill-defined nodular density seen in this region adjacent to the sigmoid colon which could potentially represent a sigmoid diverticulitis. An additional  possibility would be   inflammatory changes related to pelvic inflammatory disease. Clinical correlation is needed. If symptoms persist or worsen, repeat imaging would be recommended. No evidence for abscess or free air.     2.  Small probable collapsing cyst in the right ovary. Small to moderate amount of free fluid in the pelvis. Pelvic ultrasound could be considered in further evaluation if indicated.     3.  No significant findings elsewhere in the abdomen or pelvis.    Assessment/Plan:   Juan R Uriostegui is a 26 y.o. female with signs and symptoms consistent with recurrent epiploic appendagitis.  I have explained the pathophysiology of epiploic appendagitis in detail as well as the surgical versus non-operative management strategies. Since, she has had this recur 6-7 times in the last 2 years and her pain is quite severe, I think a diagnostic laparoscopy is reasonable at this point. From review of her multiple CT scans, it seems like each time it is recurring in a similar location in the sigmoid colon so I am hoping I will be able to find this area and excise it. Because her sigmoid colon is somewhat redundant, her pain is always in the right lower quadrant so I discussed with her that I would recommend a laparoscopic appendectomy at the time of diagnostic laparoscopy to eliminate this as a source of her pain in the future.     The risks of surgery were discussed in detail which include, but are not limited to, bleeding, infection, injury to surrounding structures, the need to convert to an open procedure. We also discussed in detail that there is a chance I may not be able to find the epiploic appendage if the inflammation has resolved by the time she has surgery. She also understands that even if I find the current epiploic appendagitis, it is possible that it could recur somewhere else along her colon in the future. She understands everything and she would like to proceed with surgery given the chance that  it may improve her symptoms. She has consented to proceed with a diagnostic laparoscopy, appendectomy and excision of epiploic appendagitis. This will be scheduled at [facility] and pt may need 23 hr observation following the surgery. Surgery scheduled for next Monday 2/15/21.     Bonnie Kaur MD  General Surgery  [facility]  [phone]

## 2021-06-15 NOTE — ANESTHESIA POSTPROCEDURE EVALUATION
Patient: Juan R Uriostegui  Procedure(s):  APPENDECTOMY, LAPAROSCOPIC, Excision of epiploic appendagitis  Anesthesia type: general    Patient location: PACU  Last vitals:   Vitals Value Taken Time   /59 02/15/21 1715   Temp 37.1  C (98.8  F) 02/15/21 1515   Pulse 68 02/15/21 1725   Resp 18 02/15/21 1715   SpO2 95 % 02/15/21 1725   Vitals shown include unvalidated device data.  Post vital signs: stable  Level of consciousness: awake and responds to simple questions  Post-anesthesia pain: pain controlled  Post-anesthesia nausea and vomiting: no  Pulmonary: unassisted  Cardiovascular: stable  Hydration: adequate  Anesthetic events: no    QCDR Measures:  ASA# 11 - Maddie-op Cardiac Arrest: ASA11B - Patient did NOT experience unanticipated cardiac arrest  ASA# 12 - Maddie-op Mortality Rate: ASA12B - Patient did NOT die  ASA# 13 - PACU Re-Intubation Rate: ASA13B - Patient did NOT require a new airway mgmt  ASA# 10 - Composite Anes Safety: ASA10A - No serious adverse event    Additional Notes:

## 2021-06-15 NOTE — PROGRESS NOTES
ASSESSMENT  And plan   1. Bipolar 1 disorder (H)  Patient admits she has not been taking her lithium regularly she states she has not taken it for about a week because she felt that the combination of the lithium and other medications especially the Percocet was making her nauseated she is no longer taking pain medication because her abdominal pain is improved and she will restart the lithium.  - lithium 300 mg tablet; Take 1 tablet (300 mg total) by mouth 4 (four) times a day.  Dispense: 120 tablet; Refill: 1    2. Non-intractable vomiting with nausea, unspecified vomiting type  Occasional nausea noted.  I refilled her Zofran side effects discussed  - ondansetron (ZOFRAN) 4 MG tablet; Take 1 tablet (4 mg total) by mouth every 8 (eight) hours as needed for nausea.  Dispense: 16 tablet; Refill: 0    3. Abdominal wall pain in left lower quadrant    She continues to have discomfort in the left lower quadrant of the abdomen over the surgical site.  This pain is localized.  She notes no difficulty while eating.  She will purchase some lidocaine cream over-the-counter I will have her see physical therapy.  - Ambulatory referral to Adult PT- External    4. Urge incontinence of urine    She continues to have bladder issues she will have frequent accidents.  She was placed on Detrol before but her symptoms continued but were slightly better.  She notes issues with coughing which caused her to have urination.  When she bears down she will frequently urinate.  She denies having any burning when she urinates.  - Ambulatory referral to Gynecology    There are no Patient Instructions on file for this visit.    Orders Placed This Encounter   Procedures     Ambulatory referral to Adult PT- External     Referral Priority:   Routine     Referral Type:   Physical Therapy     Referral Reason:   Evaluation and Treatment     Requested Specialty:   Physical Therapy     Number of Visits Requested:   1     Ambulatory referral to Gynecology  "    Referral Priority:   Routine     Referral Type:   Consultation     Referral Reason:   Evaluation and Treatment     Requested Specialty:   Gynecology     Number of Visits Requested:   1     Medications Discontinued During This Encounter   Medication Reason     lithium 300 mg tablet Reorder     ondansetron (ZOFRAN) 4 MG tablet Reorder       No follow-ups on file.    CHIEF COMPLAINT:  Chief Complaint   Patient presents with     Rectal Bleeding       HISTORY OF PRESENT ILLNESS:  Juan R is a 26 y.o. female is here for a follow-up she states that she still having pain on the left side of the abdomen where the surgery took place and the surgical site cause her discomfort so she cannot bend.  She tried to go to work and when she bends she could not get up.  She reports she needs a work note so she can return to work without heavy lifting.  She reports that her abdominal pain on the right side of her abdomen is better and she is no longer noticing any rectal bleeding.  She has not been taking her lithium for bipolar disorder for a week and feels that she may be getting excited her family member said that she is getting manic she will restart the medication she was slightly nauseated when she was taking the lithium and the Percocet she is no longer taking Percocet but is requesting some Zofran in case she throws up.  She states that she still having urinary problems and he will frequently urinate without realizing it.  She says when she coughs she urinates and she has to wear a diaper pad which is \"a \"embarrassing\".  Previously she had Detrol but that medication made her mouth dry.    REVIEW OF SYSTEMS:      positive for episodes of incontinence  GI positive for abdominal pain  10 point review of  All other systems are negative.    PFSH:  Social history reviewed    TOBACCO USE:  Social History     Tobacco Use   Smoking Status Never Smoker   Smokeless Tobacco Never Used       VITALS:  Vitals:    03/10/21 1708   BP: " "106/70   Pulse: 84   Resp: 18   Temp: 97.7  F (36.5  C)   TempSrc: Oral   Weight: (!) 260 lb 8 oz (118.2 kg)   Height: 5' 4\" (1.626 m)     Wt Readings from Last 3 Encounters:   03/10/21 (!) 260 lb 8 oz (118.2 kg)   02/23/21 (!) 250 lb (113.4 kg)   02/15/21 (!) 250 lb (113.4 kg)       PHYSICAL EXAM:  Interactive female sitting in exam room in no acute distress  HEENT neck supple mucous members moist, there is no lymph enlargement noted in the neck  Respiratory system clear to auscultation equal breath sounds no wheeze no crackles  CVS regular rate and rhythm no murmurs rubs gallops appreciated  Abdomen soft she has localized tenderness over the left upper and lower quadrants of the abdomen where the incision is present there is no organomegaly.  Bowel sounds are present.  Musculoskeletal system no tenderness noted over the CVA angle bilaterally    Psych oriented x3 well-groomed not agitated fund of knowledge is good she maintains eye contact    DATA REVIEWED:  Additional History from Old Records Summarized (2): 0  Decision to Obtain Records (1): 0  Radiology Tests Summarized or Ordered (1): 0  Labs Reviewed or Ordered (1): 0  Medicine Test Summarized or Ordered (1): 0  Independent Review of EKG or X-RAY(2 each): 0    The visit lasted a total of 30 minutes     MEDICATIONS:  Current Outpatient Medications   Medication Sig Dispense Refill     citalopram (CELEXA) 20 MG tablet Take 1 tablet (20 mg total) by mouth daily. 30 tablet 2     dicyclomine (BENTYL) 20 mg tablet Take 1 tablet (20 mg total) by mouth 4 (four) times a day as needed. 100 tablet 5     lidocaine (XYLOCAINE) 5 % ointment Apply to affected area twice daily 120 g 4     lithium 300 mg tablet Take 1 tablet (300 mg total) by mouth 4 (four) times a day. 120 tablet 1     omeprazole (PRILOSEC) 20 MG capsule Take 1 capsule (20 mg total) by mouth daily before breakfast. 30 capsule 11     ondansetron (ZOFRAN ODT) 4 MG disintegrating tablet Take 1 tablet (4 mg total) " by mouth every 8 (eight) hours as needed. 12 tablet 0     ondansetron (ZOFRAN) 4 MG tablet Take 1 tablet (4 mg total) by mouth every 8 (eight) hours as needed for nausea. 16 tablet 0     oxyCODONE-acetaminophen (PERCOCET/ENDOCET) 5-325 mg per tablet Take 1 tablet by mouth every 6 (six) hours as needed for pain. 40 tablet 0     pregabalin (LYRICA) 75 MG capsule Take 1 capsule (75 mg total) by mouth 3 (three) times a day. (Patient taking differently: Take 75 mg by mouth 3 (three) times a day as needed. ) 90 capsule 2     QUEtiapine (SEROQUEL) 25 MG tablet Take 1 tablet (25 mg total) by mouth at bedtime. 30 tablet 1     traZODone (DESYREL) 100 MG tablet Take 1 tablet (100 mg total) by mouth at bedtime. 30 tablet 3     No current facility-administered medications for this visit.      Shyanne LOZANO

## 2021-06-15 NOTE — TELEPHONE ENCOUNTER
PCP saw pt virtually on 3/2/21 for rectal bleeding. The Lidocain ointment ordered on that day is not covered under pt insurance.  Please start a PA or change med.  How does PCP wish to proceed?  Thanks.

## 2021-06-15 NOTE — TELEPHONE ENCOUNTER
Post-Op Phone Call               Surgeon: Dr. Vincent M.D.    Date of Surgery: 2/15/2021  Discharge Date: 2/15/2021    Date/Time Called:   Date: 2/19/2021 Time: 11:55 AM   Attempt: First    Notes:    Doing well. Some soreness and constipation. Encouraged fluids and use of stool softener.     Return to Work Plans?  Expected date: N/A  Do you need anything from us in this regard? No     Post-op appointment made? No      Thank you for your time. Please do not hesitate to call us with any questions or concerns.    Call completed by: Kinjal Morfin

## 2021-06-16 PROBLEM — G43.709 CHRONIC MIGRAINE WITHOUT AURA WITHOUT STATUS MIGRAINOSUS, NOT INTRACTABLE: Status: ACTIVE | Noted: 2019-02-27

## 2021-06-16 PROBLEM — M79.641 PAIN OF RIGHT HAND: Status: ACTIVE | Noted: 2019-03-19

## 2021-06-16 PROBLEM — D50.9 MICROCYTIC ANEMIA: Status: ACTIVE | Noted: 2020-08-04

## 2021-06-16 PROBLEM — Z30.430 ENCOUNTER FOR INSERTION OF MIRENA IUD: Status: ACTIVE | Noted: 2018-10-15

## 2021-06-16 PROBLEM — M62.89 PELVIC FLOOR DYSFUNCTION IN FEMALE: Status: ACTIVE | Noted: 2020-05-08

## 2021-06-16 PROBLEM — E66.01 MORBID OBESITY (H): Status: ACTIVE | Noted: 2020-11-17

## 2021-06-16 PROBLEM — K63.89 EPIPLOIC APPENDAGITIS: Status: ACTIVE | Noted: 2020-08-05

## 2021-06-16 PROBLEM — F39 MOOD DISORDER (H): Status: ACTIVE | Noted: 2019-01-31

## 2021-06-16 PROBLEM — K29.60 OTHER GASTRITIS WITHOUT HEMORRHAGE, UNSPECIFIED CHRONICITY: Status: ACTIVE | Noted: 2019-03-19

## 2021-06-16 PROBLEM — K62.89 ANAL PAIN: Status: ACTIVE | Noted: 2020-05-08

## 2021-06-16 PROBLEM — F43.10 PTSD (POST-TRAUMATIC STRESS DISORDER): Status: ACTIVE | Noted: 2018-11-28

## 2021-06-16 PROBLEM — K58.9 IRRITABLE BOWEL SYNDROME WITHOUT DIARRHEA: Status: ACTIVE | Noted: 2020-05-08

## 2021-06-16 NOTE — PROGRESS NOTES
[facility] Daily Progress     Patient Name: Juan R Uriostegui  Date: 4/1/2021    Visit #: 3/12 (end date 6/15/21)  PTA visit #: 1   Referral Diagnosis: Abdominal wall pain in left lower quadrant  Referring provider: Dixon Donaldson MD  Visit Diagnosis:     ICD-10-CM    1. Left lower quadrant abdominal pain  R10.32    2. Right sided abdominal pain  R10.9    3. Abdominal weakness  R19.8    4. Muscular deconditioning  R29.898      Precautions:   Encounter for insertion of mirena IUD  PTSD (post-traumatic stress disorder)  Mood disorder (H)  Chronic migraine without aura without status migrainosus, not intractable  Other gastritis without hemorrhage, unspecified chronicity  Pain of right hand  Irritable bowel syndrome without diarrhea  Pelvic floor dysfunction in female  Anal pain  Microcytic anemia  Epiploic appendagitis  Bipolar 1 disorder (H)  Morbid obesity (H)  IBS (irritable bowel syndrome)  APPENDECTOMY, LAPAROSCOPIC on 2/15/21  Took a piece of fatty tissue off her colon per pt report during surgery.     Assessment:   From Evaluation: Pt is a 26 y.o. year old female with chronic right sided abdominal cramping and acute left sided lower quadrant (at incision) pain that started post-appendectomy on 2/15/21.  Patient has difficulty with getting out of chair, getting out of bath, getting off the floor, walking >1 mile, standing >30 min, sitting >30 min secondary to pain. Findings are consistent with myofascial pain.  Nutritionist referral recommended.  Patient appears motivated to participate in Physical Therapy and present with a good Physical Therapy prognosis for resolution of activities limitations.   Complaint with HEP    Patient demonstrates understanding/independence with home program.  Patient is benefitting from skilled physical therapy and is making steady progress toward functional goals.  Patient is appropriate to continue with skilled physical therapy intervention, as indicated by initial plan  of care.    Goal Status: Progressing towards  Pt. will be independent with home exercise program in : 6 weeks  Pt. will report decreased intensity, frequency of : Pain;Comment  Comment:: in right and left abdominals <3/10    Pt will: perform sit to stand from low surface without abdominal pain >2/10 in 10 weeks.  Pt will: sleep without waking up due to pain more then 1x a night in 8 weeks.  Pt will: get up off the floor without pain >2/10 in 12 weeks.      Plan / Patient Education:     Continue with initial plan of care.  Progress with home program as tolerated.     Plan for next visit:   Stretching   Continue to discuss gym   Nutritionist referral * Dr. Kunz   Yoga postures   Add hooklying leg extension to HEP      Priyanka: I would like to focus on ex's with her, if you think she would benefit from continued manual therapy please reschedule her. She is currently unemployed and has time to focus on her wellbeing so I would like to get her in 2x a week if possible (one with you, one with me - 25 min )  Subjective:   Burning on left-lower incision.   Pain Ratin/10 cramping   Sleeping last night hurt lying on left side   Busy  With 4 year old son during the day  Walk a lot   Starting a job next week sometime    Objective:   Pt reports starting work next week  Discussed scar massage and instructed pt   Added stationary bike, souleymane pose and cat/cow  Exercises:  Exercise #1: TA: pelvic tilts with 5 second hold x8-10 reps in PPT  Comment #1: TA: with marching in hooklying x10 each side - added leg extension (give as HEP next session)  Exercise #2: lay prone with pillow under pelvis as needed 3-4 minutes (did not trial in the clinic due to firmness of table)  Comment #2: If laying prone feels ok without pillow trial JORDAN 30 seconds  Exercise #3: quad stretch with leg off the table hold 20-30 seconds  Comment #3: Standing lumbar extension  Exercise #4: stionary bike level 3#  10 mins  Comment #4: cat/cow  10x 5 sec  hold  Exercise #5: souleymane pose 2x      Treatment Today  4/1/2021     TREATMENT MINUTES COMMENTS   Evaluation     Self-care/ Home management     Manual therapy 6 Instructions on scar massage   Neuromuscular Re-education     Therapeutic Activity     Therapeutic Exercises 23 See flow sheet above - discussed gym membership    Gait training     Modality__________________                Total 29    Blank areas are intentional and mean the treatment did not include these items.       Lowell Gordillo PTA  4/1/2021

## 2021-06-16 NOTE — PROGRESS NOTES
[facility] Daily Progress     Patient Name: Juan R Uriostegui  Date: 4/6/2021  Visit #: 4/12 (end date 6/15/21)  PTA visit #:  1  Referral Diagnosis: Abdominal wall pain in left lower quadrant  Referring provider: Dixon Donaldson MD  Visit Diagnosis:     ICD-10-CM    1. Left lower quadrant abdominal pain  R10.32    2. Right sided abdominal pain  R10.9    3. Abdominal weakness  R19.8    4. Muscular deconditioning  R29.898    5. History of laparoscopy  Z98.890    6. History of appendectomy  Z90.49      Precautions:   Encounter for insertion of mirena IUD  PTSD (post-traumatic stress disorder)  Mood disorder (H)  Chronic migraine without aura without status migrainosus, not intractable  Other gastritis without hemorrhage, unspecified chronicity  Pain of right hand  Irritable bowel syndrome without diarrhea  Pelvic floor dysfunction in female  Anal pain  Microcytic anemia  Epiploic appendagitis  Bipolar 1 disorder (H)  Morbid obesity (H)  IBS (irritable bowel syndrome)  APPENDECTOMY, LAPAROSCOPIC on 2/15/21  Took a piece of fatty tissue off her colon per pt report during surgery.     Assessment:     From Evaluation: Pt is a 26 y.o. year old female with chronic right sided abdominal cramping and acute left sided lower quadrant (at incision) pain that started post-appendectomy on 2/15/21.  Patient has difficulty with getting out of chair, getting out of bath, getting off the floor, walking >1 mile, standing >30 min, sitting >30 min secondary to pain. Findings are consistent with myofascial pain.  Nutritionist referral recommended.  Patient appears motivated to participate in Physical Therapy and present with a good Physical Therapy prognosis for resolution of activities limitations.     HEP/POC compliance is  good .  Patient demonstrates understanding/independence with home program.  Response to Intervention good. Patient is responding appropriately to interventions.   Patient is appropriate to continue with  skilled physical therapy intervention, as indicated by initial plan of care.    Goal Status:  Pt. will be independent with home exercise program in : 6 weeks  Pt. will report decreased intensity, frequency of : Pain;Comment  Comment:: in right and left abdominals <3/10    Pt will: perform sit to stand from low surface without abdominal pain >2/10 in 10 weeks.  Pt will: sleep without waking up due to pain more then 1x a night in 8 weeks.  Pt will: get up off the floor without pain >2/10 in 12 weeks.      Plan / Patient Education:     Continue with initial plan of care.  Progress with home program as tolerated.   Patient is appropriate for a combination of manual therapy to address adhesions and exercise.      Plan for next visit:   Stretching   Stationary bike   Continue to discuss gym   Nutritionist referral * Dr. Kunz   Ask about right sided cramping.   Yoga postures   Add hooklying leg extension to HEP     Subjective:     Pain Ratin  Exercises have been going okay. She hasn't been doing them the last few days due to IBS symptoms. No pain complaints today. Abdominal pain is located over (L) lower abdominal incision, sometimes on the (R) side also. Worse with bending, lifting, squatting. She is going to look at [employer] for a gym membership.     Objective:     Mild/mod tenderness of abdominal incisions. Mild/mod fascial restrictions with associated tenderness of sigmoid, cecum, mesenteric root.   Colon motility is moderately restricted.   Abdominal recruitment is fair/poor. Overuse of gluts and upper back with stabilization exercises.     Treatment Today     TREATMENT MINUTES COMMENTS   Evaluation     Self-care/ Home management     Manual therapy 40 Induction, indirect, direct techniques utilized as appropriate for optimal tissue release.   MFR/SCS - (L) abdominal incision, sigmoid colon, mesenteric root/sm intestine, cecum, colon motility, peritoneum,    Neuromuscular Re-education     Therapeutic  Activity     Therapeutic Exercises 15 Exercises per flow sheet.   Exercises:  Exercise #1: TA: pelvic tilts with 5 second hold x8-10 reps in PPT  Comment #1: TA: with marching in hooklying/leg extension 10 x each  Comment #4: cat/cow  10x 5 sec hold       Gait training     Modality__________________                Total 55    Blank areas are intentional and mean the treatment did not include these items.       Priyanka Walls  4/6/2021

## 2021-06-16 NOTE — PROGRESS NOTES
[facility] Daily Progress     Patient Name: Juan R Uriostegui  Date: 3/30/2021  Visit #: 2/12 (end date 6/15/21)  PTA visit #:    Referral Diagnosis: Abdominal wall pain in left lower quadrant  Referring provider: Dixon Donaldson MD  Visit Diagnosis:     ICD-10-CM    1. Left lower quadrant abdominal pain  R10.32    2. Right sided abdominal pain  R10.9    3. Abdominal weakness  R19.8    4. Muscular deconditioning  R29.898      Precautions:   Encounter for insertion of mirena IUD  PTSD (post-traumatic stress disorder)  Mood disorder (H)  Chronic migraine without aura without status migrainosus, not intractable  Other gastritis without hemorrhage, unspecified chronicity  Pain of right hand  Irritable bowel syndrome without diarrhea  Pelvic floor dysfunction in female  Anal pain  Microcytic anemia  Epiploic appendagitis  Bipolar 1 disorder (H)  Morbid obesity (H)  IBS (irritable bowel syndrome)  APPENDECTOMY, LAPAROSCOPIC on 2/15/21  Took a piece of fatty tissue off her colon per pt report during surgery.     Assessment:   From Evaluation: Pt is a 26 y.o. year old female with chronic right sided abdominal cramping and acute left sided lower quadrant (at incision) pain that started post-appendectomy on 2/15/21.  Patient has difficulty with getting out of chair, getting out of bath, getting off the floor, walking >1 mile, standing >30 min, sitting >30 min secondary to pain. Findings are consistent with myofascial pain.  Nutritionist referral recommended.  Patient appears motivated to participate in Physical Therapy and present with a good Physical Therapy prognosis for resolution of activities limitations.     Patient demonstrates understanding/independence with home program.  Patient is benefitting from skilled physical therapy and is making steady progress toward functional goals.  Patient is appropriate to continue with skilled physical therapy intervention, as indicated by initial plan of care.    Goal  Status:  Pt. will be independent with home exercise program in : 6 weeks  Pt. will report decreased intensity, frequency of : Pain;Comment  Comment:: in right and left abdominals <3/10    Pt will: perform sit to stand from low surface without abdominal pain >2/10 in 10 weeks.  Pt will: sleep without waking up due to pain more then 1x a night in 8 weeks.  Pt will: get up off the floor without pain >2/10 in 12 weeks.      Plan / Patient Education:     Continue with initial plan of care.  Progress with home program as tolerated.     Plan for next visit:   Stretching   Stationary bike   Continue to discuss gym   Nutritionist referral * Dr. Kunz   Ask about right sided cramping.   Yoga postures   Add hooklying leg extension to HEP      Priyanka: I would like to focus on ex's with her, if you think she would benefit from continued manual therapy please reschedule her. She is currently unemployed and has time to focus on her wellbeing so I would like to get her in 2x a week if possible (one with you, one with me - 25 min )  Subjective:   Burning on left-lower incision.   Pain Rating: not having pain today - but sometimes it will start burning.     Objective:   Exercises:  Exercise #1: TA: pelvic tilts with 5 second hold x8-10 reps in PPT  Comment #1: TA: with marching in hooklying x10 each side - added leg extension (give as HEP next session)  Exercise #2: lay prone with pillow under pelvis as needed 3-4 minutes (did not trial in the clinic due to firmness of table)  Comment #2: If laying prone feels ok without pillow trial JORDAN 30 seconds  Exercise #3: quad stretch with leg off the table hold 20-30 seconds  Comment #3: Standing lumbar extension    Performs 2x a day.   Goal: to go the gym (pt had a membership at a gym - before she got pregnant - son is 4 years old)   Pt reports pain with sleeping prone but can performed JORDAN today without pain.     Pt looked into [employer], [employer], [employer]. Would like to run on treadmill  and do weight machine.    Walking: has always walked a lot. 1 mile a day if the weather is nice.     Treatment Today     TREATMENT MINUTES COMMENTS   Evaluation     Self-care/ Home management     Manual therapy     Neuromuscular Re-education     Therapeutic Activity     Therapeutic Exercises 25 See flow sheet above - discussed gym membership    Gait training     Modality__________________                Total     Blank areas are intentional and mean the treatment did not include these items.       Neelam Howe  3/30/2021

## 2021-06-16 NOTE — PROGRESS NOTES
[facility] Daily Progress     Patient Name: Juan R Uriostegui  Date: 3/31/2021  Visit #: 3/12 (end date 6/15/21)  PTA visit #:    Referral Diagnosis: Abdominal wall pain in left lower quadrant  Referring provider: Dixon Donaldson MD  Visit Diagnosis:   No diagnosis found.  Precautions:   Encounter for insertion of mirena IUD  PTSD (post-traumatic stress disorder)  Mood disorder (H)  Chronic migraine without aura without status migrainosus, not intractable  Other gastritis without hemorrhage, unspecified chronicity  Pain of right hand  Irritable bowel syndrome without diarrhea  Pelvic floor dysfunction in female  Anal pain  Microcytic anemia  Epiploic appendagitis  Bipolar 1 disorder (H)  Morbid obesity (H)  IBS (irritable bowel syndrome)  APPENDECTOMY, LAPAROSCOPIC on 2/15/21  Took a piece of fatty tissue off her colon per pt report during surgery.     Assessment:   From Evaluation: Pt is a 26 y.o. year old female with chronic right sided abdominal cramping and acute left sided lower quadrant (at incision) pain that started post-appendectomy on 2/15/21.  Patient has difficulty with getting out of chair, getting out of bath, getting off the floor, walking >1 mile, standing >30 min, sitting >30 min secondary to pain. Findings are consistent with myofascial pain.  Nutritionist referral recommended.  Patient appears motivated to participate in Physical Therapy and present with a good Physical Therapy prognosis for resolution of activities limitations.     Patient demonstrates understanding/independence with home program.  Patient is benefitting from skilled physical therapy and is making steady progress toward functional goals.  Patient is appropriate to continue with skilled physical therapy intervention, as indicated by initial plan of care.    Goal Status:  Pt. will be independent with home exercise program in : 6 weeks  Pt. will report decreased intensity, frequency of : Pain;Comment  Comment:: in right and  left abdominals <3/10    Pt will: perform sit to stand from low surface without abdominal pain >2/10 in 10 weeks.  Pt will: sleep without waking up due to pain more then 1x a night in 8 weeks.  Pt will: get up off the floor without pain >2/10 in 12 weeks.      Plan / Patient Education:     Continue with initial plan of care.  Progress with home program as tolerated.     Plan for next visit:   Stretching   Stationary bike   Continue to discuss gym   Nutritionist referral * Dr. Kunz   Ask about right sided cramping.   Yoga postures   Add hooklying leg extension to HEP      Priyanka: I would like to focus on ex's with her, if you think she would benefit from continued manual therapy please reschedule her. She is currently unemployed and has time to focus on her wellbeing so I would like to get her in 2x a week if possible (one with you, one with me - 25 min )  Subjective:   Burning on left-lower incision.   Pain Rating: not having pain today - but sometimes it will start burning.     Objective:   Exercises:  Exercise #1: TA: pelvic tilts with 5 second hold x8-10 reps in PPT  Comment #1: TA: with marching in hooklying x10 each side - added leg extension (give as HEP next session)  Exercise #2: lay prone with pillow under pelvis as needed 3-4 minutes (did not trial in the clinic due to firmness of table)  Comment #2: If laying prone feels ok without pillow trial JORDAN 30 seconds  Exercise #3: quad stretch with leg off the table hold 20-30 seconds  Comment #3: Standing lumbar extension    Performs 2x a day.   Goal: to go the gym (pt had a membership at a gym - before she got pregnant - son is 4 years old)   Pt reports pain with sleeping prone but can performed JORDAN today without pain.     Pt looked into [employer], [employer], [employer]. Would like to run on treadmill and do weight machine.    Walking: has always walked a lot. 1 mile a day if the weather is nice.     Treatment Today     TREATMENT MINUTES COMMENTS   Evaluation      Self-care/ Home management     Manual therapy     Neuromuscular Re-education     Therapeutic Activity     Therapeutic Exercises 25 See flow sheet above - discussed gym membership    Gait training     Modality__________________                Total     Blank areas are intentional and mean the treatment did not include these items.       Neelam Howe  3/31/2021

## 2021-06-16 NOTE — PROGRESS NOTES
[facility] Daily Progress     Patient Name: Juan R Uriostegui  Date: 4/22/2021  Visit #: 5/12 (end date 6/15/21)  PTA visit #:  1  Referral Diagnosis: Abdominal wall pain in left lower quadrant  Referring provider: Dixon Donaldson MD  Visit Diagnosis:     ICD-10-CM    1. Left lower quadrant abdominal pain  R10.32    2. Right sided abdominal pain  R10.9    3. Abdominal weakness  R19.8    4. Muscular deconditioning  R29.898    5. History of laparoscopy  Z98.890    6. History of appendectomy  Z90.49          Assessment:     From Evaluation: Pt is a 26 y.o. year old female with chronic right sided abdominal cramping and acute left sided lower quadrant (at incision) pain that started post-appendectomy on 2/15/21.  Patient has difficulty with getting out of chair, getting out of bath, getting off the floor, walking >1 mile, standing >30 min, sitting >30 min secondary to pain. Findings are consistent with myofascial pain.  Nutritionist referral recommended.  Patient appears motivated to participate in Physical Therapy and present with a good Physical Therapy prognosis for resolution of activities limitations.      HEP/POC compliance is fair.  Patient now has a health club membership.   Patient demonstrates understanding/independence with home program.  Response to Intervention good. Patient is responding appropriately to interventions.   Patient is appropriate to continue with skilled physical therapy intervention, as indicated by initial plan of care.    Goal Status:  Pt. will be independent with home exercise program in : 6 weeks;Progressing toward  Pt. will report decreased intensity, frequency of : Pain;Comment;Progressing toward  Comment:: in right and left abdominals <3/10    status: pain level 2/10 today    Pt will: perform sit to stand from low surface without abdominal pain >2/10 in 10 weeks.  Pt will: sleep without waking up due to pain more then 1x a night in 8 weeks.  Pt will: get up off the floor  "without pain >2/10 in 12 weeks.      Plan / Patient Education:     Continue with initial plan of care.  Progress with home program as tolerated.   Patient is appropriate for a combination of manual therapy to address adhesions and exercise.   Check for mesentery restrictions on follow up visit.     Subjective:     Pain Ratin  Joined [email or web address] and has gone a couple times. Used the treadmill and stationary bike, got a massage. Hasn't used any weight machines yet. Exercises are going okay, but hasn't been doing them every day. Has been moving furniture at home.   Having a lot of burning (L) abdominal incision area. It comes and goes. Has been massaging it and using heating pad.     Objective:     From the surgical note: \"I then inspected the sigmoid colon and in the mid sigmoid, I found a necrotic inflamed piece of fat. I dissected this off of the surrounding tissue and it was quite stuck from the inflammation but I was able to separate it off the colon without injuring the colon.\"     Mild/mod fascial restrictions of sigmoid colon, peritoneum, (L) lower abdomen.     Treatment Today   Has to work, needs to leave early  TREATMENT MINUTES COMMENTS   Evaluation     Self-care/ Home management     Manual therapy 30 Induction, indirect, direct techniques utilized as appropriate for optimal tissue release.   MFR/SCS - sigmoid colon, peritoneum, (L) abdominal scar, (L) MR-V   Neuromuscular Re-education     Therapeutic Activity     Therapeutic Exercises     Gait training     Modality__________________                Total 30    Blank areas are intentional and mean the treatment did not include these items.       Priyanka Walls  2021    "

## 2021-06-17 NOTE — PROGRESS NOTES
[facility] Daily Progress     Patient Name: Juan R Uriostegui  Date: 5/6/2021  Visit #: 8/12 (end date 6/15/21)  PTA visit #:  1  Referral Diagnosis: Abdominal wall pain in left lower quadrant  Referring provider: Dixon Donaldson MD  Visit Diagnosis:     ICD-10-CM    1. Left lower quadrant abdominal pain  R10.32    2. Right sided abdominal pain  R10.9    3. Abdominal weakness  R19.8    4. Muscular deconditioning  R29.898    5. History of laparoscopy  Z98.890    6. History of appendectomy  Z90.49          Assessment:     From Evaluation: Pt is a 26 y.o. year old female with chronic right sided abdominal cramping and acute left sided lower quadrant (at incision) pain that started post-appendectomy on 2/15/21.  Patient has difficulty with getting out of chair, getting out of bath, getting off the floor, walking >1 mile, standing >30 min, sitting >30 min secondary to pain. Findings are consistent with myofascial pain.  Nutritionist referral recommended.  Patient appears motivated to participate in Physical Therapy and present with a good Physical Therapy prognosis for resolution of activities limitations.     HEP/POC compliance is  good .  Patient demonstrates understanding/independence with home program.  Response to Intervention fair.   Patient is appropriate to continue with skilled physical therapy intervention, as indicated by initial plan of care.    Goal Status:  Pt. will be independent with home exercise program in : 6 weeks;Progressing toward  Pt. will report decreased intensity, frequency of : Pain;Comment;Progressing toward  Comment:: in right and left abdominals <3/10    status: pain level 2/10 today    Pt will: perform sit to stand from low surface without abdominal pain >2/10 in 10 weeks.  Pt will: sleep without waking up due to pain more then 1x a night in 8 weeks.  Pt will: get up off the floor without pain >2/10 in 12 weeks.      Plan / Patient Education:     Continue with initial plan of  care.  Progress with home program as tolerated.  Patient is appropriate for a combination of manual therapy to address adhesions and exercise.     Subjective:     Pain Rating: 10  In a lot of pain today, menstrual cycle has started. Had a lot of pain in the incision site yesterday. Had follow up with primary care yesterday, is continuing meds for IBS and lyrica. Will follow up in a couple weeks.     Objective:     Initial cranial scan is (+) for cerv/thoracic mesentery. Visceral lymphatic scan is (-).   Tenderness and associated fascial restrictions of (R) ant neck, (L) ant 1st rib,     Treatment Today     TREATMENT MINUTES COMMENTS   Evaluation     Self-care/ Home management     Manual therapy 55 Induction, indirect, direct techniques utilized as appropriate for optimal tissue release.   MFR/SCS - (R) MES3,5-6-V, (L) MESC7-V, (L) MEST3-5-V, (R) [id number]-G, peritoneum, MFR to (L) mid/lower abdominal wall,      Neuromuscular Re-education     Therapeutic Activity     Therapeutic Exercises     Gait training     Modality__________________                Total 55    Blank areas are intentional and mean the treatment did not include these items.       Priyanka Walls  5/6/2021

## 2021-06-17 NOTE — TELEPHONE ENCOUNTER
Telephone Encounter by Juhi Sterling at 11/27/2020 12:15 PM     Author: Juhi Sterling Service: -- Author Type: --    Filed: 11/27/2020 12:16 PM Encounter Date: 11/25/2020 Status: Signed    : Juhi Sterling       PRIOR AUTHORIZATION DENIED    Denial Rational: Diagnosis on Rx not an FDA approved diagnosis for this medication        Appeal Information: This medication was denied. If physician would like to appeal because patient has contraindication or allergy to covered medication please write letter of medical necessity and route back to PA team to initiate.  If no further action is needed please close encounter thank you.

## 2021-06-17 NOTE — PROGRESS NOTES
"ASSESSMENT and plan   1. Bipolar 1 disorder (H)  Now taking Lyrica taking lithium 4 times daily she needs to see psychiatry.  Symptoms are controlled no manic episodes no cycling noted  - pregabalin (LYRICA) 75 MG capsule; Take 1 capsule (75 mg total) by mouth 3 (three) times a day.  Dispense: 90 capsule; Refill: 2  - Ambulatory referral to Psychiatry    2. Memory problem    Difficulty with memory short-term memory seems to be impaired this is never occurred before like to her to see neurology.  - Ambulatory referral to Neurology    3. Abnormal involuntary movement    One episode of involuntary movement noted yesterday with paresthesia noted while shopping lasted 10 minutes she is worried that she will undergo further \"\" spells  - Comprehensive Metabolic Panel  - Ambulatory referral to Neurology    4. Episodes of formed visual hallucinations    Is having visual hallucinations 2-3 times per week Zyprexa started side effects discussed will monitor for side effects, will see her again in 10 days  - Comprehensive Metabolic Panel  - OLANZapine (ZYPREXA) 5 MG tablet; Take 1 tablet (5 mg total) by mouth at bedtime.  Dispense: 30 tablet; Refill: 2  - Ambulatory referral to Psychiatry    5. Mood disorder (H)    Taking Celexa no anxiety noted  - citalopram (CELEXA) 20 MG tablet; Take 1 tablet (20 mg total) by mouth daily.  Dispense: 30 tablet; Refill: 2  - Ambulatory referral to Psychiatry    6. Irritable bowel syndrome with both constipation and diarrhea    Has started the Bentyl abdominal discomfort is decreased  - citalopram (CELEXA) 20 MG tablet; Take 1 tablet (20 mg total) by mouth daily.  Dispense: 30 tablet; Refill: 2    7. Pelvic floor dysfunction in female    - pregabalin (LYRICA) 75 MG capsule; Take 1 capsule (75 mg total) by mouth 3 (three) times a day.  Dispense: 90 capsule; Refill: 2    8. PTSD (post-traumatic stress disorder)    Denies nightmares denies flashbacks        There are no Patient Instructions on file " for this visit.    Orders Placed This Encounter   Procedures     Comprehensive Metabolic Panel     Ambulatory referral to Psychiatry     Referral Priority:   Routine     Referral Type:   Behavioral Health     Referral Reason:   Evaluation and Treatment     Requested Specialty:   Psychiatry     Number of Visits Requested:   1     Ambulatory referral to Neurology     Referral Priority:   Routine     Referral Type:   Consultation     Referral Reason:   Evaluation and Treatment     Requested Specialty:   Neurology     Number of Visits Requested:   1     Medications Discontinued During This Encounter   Medication Reason     QUEtiapine (SEROQUEL) 25 MG tablet Alternate therapy     citalopram (CELEXA) 20 MG tablet Reorder     pregabalin (LYRICA) 75 MG capsule Reorder       No follow-ups on file.    CHIEF COMPLAINT:  Chief Complaint   Patient presents with     Follow-up       HISTORY OF PRESENT ILLNESS:  Juan R is a 26 y.o. female who is here for follow-up of her bipolar disorder, anxiety, memory problems, irritable bowel and hallucinations.  She reports that she has started the Lyrica for pelvic floor discomfort.  She reports that over the last week she has had 2 episodes of seeing things running across her window when she was awake and she has become paranoid that there may be animals outside her window.  She knows that this is ailogical assumption.  She states she did not not sleeping well because she is always worried.  She had one episode of involuntary movement noted yesterday when she was shopping the lasted for 10 minutes.  She began shaking and developed numbness in her fingers because she was breathing fast.  It took her 10 minutes to calm down she states that she still feels shaky.  She denies losing consciousness or having any bowel control problems.  She is taking her lithium faithfully.  She has not started her new job.  She denies being depressed but she is anxious about her newfound symptoms.  She states  that she does not have any abdominal pain or nausea.    REVIEW OF SYSTEMS:   Psych positive for visual hallucinations increasing anxiety,  Endocrine positive for sweating on one occurrence  Neuro positive for impaired memory involuntary movement noted yesterday  All other systems are negative.    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    TOBACCO USE:  Social History     Tobacco Use   Smoking Status Never Smoker   Smokeless Tobacco Never Used       VITALS:  Vitals:    05/14/21 0839   BP: 96/68   Patient Site: Left Arm   Patient Position: Sitting   Cuff Size: Adult Regular   Pulse: 95   Temp: 98  F (36.7  C)   TempSrc: Oral   SpO2: 97%   Weight: (!) 266 lb (120.7 kg)     Wt Readings from Last 3 Encounters:   05/14/21 (!) 266 lb (120.7 kg)   05/05/21 (!) 260 lb 9 oz (118.2 kg)   03/10/21 (!) 260 lb 8 oz (118.2 kg)       PHYSICAL EXAM:  Interactive female sitting in exam room in no acute distress well groomed  Psych oriented x3 not agitated speech is soft she maintains eye contact  Neuro cranial nerves II to XII intact gait is normal reflexes are brisk motor tone is normal in the upper and lower extremities,   CVS regular rate and rhythm no murmurs rubs gallops appreciated  Respiratory system clear to auscultation equal breath sounds no wheezes no crackles    DATA REVIEWED:  Additional History from Old Records Summarized (2): 0  Decision to Obtain Records (1): 0  Radiology Tests Summarized or Ordered (1): 0  Labs Reviewed or Ordered (1): 1  Medicine Test Summarized or Ordered (1): 0  Independent Review of EKG or X-RAY(2 each): 0    The visit lasted a total of 30 minutes     MEDICATIONS:  Current Outpatient Medications   Medication Sig Dispense Refill     citalopram (CELEXA) 20 MG tablet Take 1 tablet (20 mg total) by mouth daily. 30 tablet 2     dicyclomine (BENTYL) 20 mg tablet Take 1 tablet (20 mg total) by mouth 4 (four) times a day as needed. 100 tablet 5     lidocaine (XYLOCAINE) 5 % ointment Apply to affected area  twice daily 120 g 4     lithium 300 mg tablet Take 1 tablet (300 mg total) by mouth 4 (four) times a day. 120 tablet 1     omeprazole (PRILOSEC) 20 MG capsule Take 1 capsule (20 mg total) by mouth daily before breakfast. 30 capsule 11     ondansetron (ZOFRAN ODT) 4 MG disintegrating tablet Take 1 tablet (4 mg total) by mouth every 8 (eight) hours as needed. 12 tablet 0     ondansetron (ZOFRAN) 4 MG tablet Take 1 tablet (4 mg total) by mouth every 8 (eight) hours as needed for nausea. 16 tablet 0     oxyCODONE-acetaminophen (PERCOCET/ENDOCET) 5-325 mg per tablet Take 1 tablet by mouth every 6 (six) hours as needed for pain. 40 tablet 0     pregabalin (LYRICA) 75 MG capsule Take 1 capsule (75 mg total) by mouth 3 (three) times a day. 90 capsule 2     OLANZapine (ZYPREXA) 5 MG tablet Take 1 tablet (5 mg total) by mouth at bedtime. 30 tablet 2     No current facility-administered medications for this visit.

## 2021-06-17 NOTE — PROGRESS NOTES
[facility] Daily Progress     Patient Name: Juan R Uriostegui  Date: 4/27/2021  Visit #: 6/12 (end date 6/15/21)  PTA visit #:  1  Referral Diagnosis: Abdominal wall pain in left lower quadrant  Referring provider: Dixon Donaldson MD  Visit Diagnosis:     ICD-10-CM    1. Left lower quadrant abdominal pain  R10.32    2. Right sided abdominal pain  R10.9    3. Abdominal weakness  R19.8        Assessment:     From Evaluation: Pt is a 26 y.o. year old female with chronic right sided abdominal cramping and acute left sided lower quadrant (at incision) pain that started post-appendectomy on 2/15/21.  Patient has difficulty with getting out of chair, getting out of bath, getting off the floor, walking >1 mile, standing >30 min, sitting >30 min secondary to pain. Findings are consistent with myofascial pain.  Nutritionist referral recommended.  Patient appears motivated to participate in Physical Therapy and present with a good Physical Therapy prognosis for resolution of activities limitations.      Pt does report pain relief with manual therapy.   HEP/POC compliance is poor- pt lost HEP sheets - reprinted today.  Patient now has a health club membership.   Patient demonstrates understanding/independence with home program.  Response to Intervention good. Patient is responding appropriately to interventions.   Patient is appropriate to continue with skilled physical therapy intervention, as indicated by initial plan of care.    Goal Status:  Pt. will be independent with home exercise program in : 6 weeks;Progressing toward  Pt. will report decreased intensity, frequency of : Pain;Comment;Progressing toward  Comment:: in right and left abdominals <3/10    status: pain level 2/10 today    Pt will: perform sit to stand from low surface without abdominal pain >2/10 in 10 weeks.  Pt will: sleep without waking up due to pain more then 1x a night in 8 weeks.  Pt will: get up off the floor without pain >2/10 in 12  "weeks.      Plan / Patient Education:     Continue with initial plan of care.  Progress with home program as tolerated.   Patient is appropriate for a combination of manual therapy to address adhesions and exercise.   Review cat/cow, TA leg ext, quadruped with leg ex, press-ups - ask about [employer] workouts   Check for mesentery restrictions on follow up visit.     Subjective:   Wedding planning. Recovering from a lot of lifting of furniture.   A lot of burning in incision area in left abdomin.  Still has cramping on right abdomin.    Pain Ratin - was in pain yesterday.   Joined [employer] and has gone a couple times. Used the treadmill and stationary bike. 10# weight machine for UE.   HEP sheet lost     Objective:     Past session: From the surgical note: \"I then inspected the sigmoid colon and in the mid sigmoid, I found a necrotic inflamed piece of fat. I dissected this off of the surrounding tissue and it was quite stuck from the inflammation but I was able to separate it off the colon without injuring the colon.\"     Pt is now able to lay prone without pillow - no pain.   Able to lay in JORDAN without pain.     Exercises:  Exercise #1: quaduped with leg extension and TA contraction 10 on each side  Comment #1: TA: with marching in hooklying/leg extension 10 x each  Exercise #2: lay pone without use of pillow  Comment #2: JORDAN 30 seconds / press-ups x10  Exercise #3: quad stertch with leg off table without band  Comment #3: standing lumbar extension  Exercise #4: discontinued child's pose due to abdominal pain  Comment #4: cat/cow  10x 5 sec hold  Exercise #5: stational bike level #4 5 min    Treatment Today    TREATMENT MINUTES COMMENTS   Evaluation     Self-care/ Home management     Manual therapy 10 Counterstrain scans: visceral and PPT - no tender points identified   MFR to scar in leg abdomin, pelvic diaphragm release.      Neuromuscular Re-education     Therapeutic Activity     Therapeutic " Exercises 35 See above    Gait training     Modality__________________                Total 45    Blank areas are intentional and mean the treatment did not include these items.       Neelam Howe  4/27/2021
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
## 2021-06-18 NOTE — PATIENT INSTRUCTIONS - HE
Patient Instructions by Neelam Howe PT at 5/4/2021  9:30 AM     Author: Neelam Howe PT Service: -- Author Type: Physical Therapist    Filed: 5/4/2021  9:47 AM Encounter Date: 5/4/2021 Status: Signed    : Neelam Howe PT (Physical Therapist)        PLANK    While lying face down, lift your body up on your elbows and toes. Try and maintain a straight spine. Do not allow your hips or pelvis on either side to drop.     You may modify by planking on your knees      LATERAL PLANK    While lying on your side with your knees bent, lift your body up on your elbow. Try and maintain a straight spine.    You may modify be bending your knees.

## 2021-06-18 NOTE — PATIENT INSTRUCTIONS - HE
Patient Instructions by Neelam Howe PT at 3/23/2021  9:00 AM     Author: Neelam Howe PT Service: -- Author Type: Physical Therapist    Filed: 3/23/2021  9:49 AM Encounter Date: 3/23/2021 Status: Signed    : Neelam Howe PT (Physical Therapist)        ABDOMINAL BRACING    While lying on your back, tighten your stomach muscles as you draw your navel down towards the floor.    BRACE MARCHING    While lying on your back with your knees bent,  slowly raise up one foot a few inches and then set it back down.  Next, perform on your other leg.  Use your stomach muscles to keep your spine from moving.      PRONE LYING    Lie on your stomach on a flat surface with either a towel roll under your forehead or your hands under your forehead.  You can also place a pillow under your abdomen.    PRONE ON ELBOWS - JORDAN    Lying face down, slowly press up and prop yourself up on your elbows.

## 2021-06-18 NOTE — PATIENT INSTRUCTIONS - HE
Patient Instructions by Neelam Howe PT at 4/27/2021  9:30 AM     Author: Neelam Howe PT Service: -- Author Type: Physical Therapist    Filed: 4/27/2021  9:54 AM Encounter Date: 4/27/2021 Status: Signed    : Neelam Howe PT (Physical Therapist)        ABDOMINAL BRACING    While lying on your back, tighten your stomach muscles as you draw your navel down towards the floor.    BRACE MARCHING    While lying on your back with your knees bent,  slowly raise up one foot a few inches and then set it back down.  Next, perform on your other leg.  Use your stomach muscles to keep your spine from moving.    Back down and belly-in 10x on each side     BRACE - SINGLE KNEE EXTENSION    While lying on your back with knees bent, straighten out one knee while keeping the leg off the ground. Hold as indicated, then return to original position. Next, perform on the other leg.    Use your stomach muscles to keep your spine from moving the entire time.    Back down and belly-in 10x on each side       Quad stretch off edge of bed    Lie down on your back with your leg off the edge of your bed.  Put a belt or leash around your ankle and use it to bend your knee back until a stretch is felt in the front of your thigh.     Without band hold 30-90 seconds           PRONE LYING    Lie on your stomach on a flat surface with either a towel roll under your forehead or your hands under your forehead.  You can also place a pillow under your abdomen.  Without pillow     PRONE ON ELBOWS - JORDAN    Lying face down, slowly press up and prop yourself up on your elbows.    30-90 seconds     PRESS UPS    Lying face down, slowly press up and arch your back using your arms.    x10       CAT AND COW    While on your hands and knees in a crawl position, raise up your back and arch it towards the ceiling like an angry cat.    Next return to a lowered position and arch your back the opposite direction.    x10      QUADRUPED LEG  LIFT    Start on hands and knees while keeping your spine flat and neutral.  Tighten abdominal muscles.  Raise leg back and hold 3-5 seconds.  Return to original position and alternate legs.    Belly pull in - x10 each side         STANDING EXTENSIONS    Start by standing and place your hands on your hips with your thumbs grasping your low back. Lean back to arch your back then return to starting position. Use your thumbs to help isolate where you want to bend.

## 2021-06-18 NOTE — PATIENT INSTRUCTIONS - HE
"Pt crying anxiously "I love my kids, I cant leave them. They cant be taken away from me." Pt words are slurred. Pt states "can I call my boyfriend while in [facility]?" pt informed that is not in the best judgement. Pt states "Can I call my kids dad so I can tell him where I am so he can get my kids?".  Pt notified that she will be given a phone to call kids dad.   " Patient Instructions by Neelam Howe PT at 3/30/2021  8:00 AM     Author: Neelam Howe PT Service: -- Author Type: Physical Therapist    Filed: 3/30/2021  8:26 AM Encounter Date: 3/30/2021 Status: Signed    : Neelam Howe PT (Physical Therapist)        Quad stretch off edge of bed    Lie down on your back with your leg off the edge of your bed.  Put a belt or leash around your ankle and use it to bend your knee back until a stretch is felt in the front of your thigh.     20-30 seconds           Back Extension    Standing lumbar extension    Hands on the hips, keeping your gaze forward, gently press your hips forward to tolerance and return to neutral spinal position.    *Perform with wall behind you for support

## 2021-06-18 NOTE — PATIENT INSTRUCTIONS - HE
Patient Instructions by Lowell Gordillo PTA at 4/1/2021  8:30 AM     Author: Lowell Gordillo PTA Service: -- Author Type: Physical Therapist Assistant    Filed: 4/1/2021  8:55 AM Encounter Date: 4/1/2021 Status: Signed    : Lowell Gordillo PTA (Physical Therapist Assistant)        CAT AND COW    While on your hands and knees in a crawl position, raise up your back and arch it towards the ceiling like an angry cat.    Next return to a lowered position and arch your back the opposite direction.            RADHA POSE    Exhale to take your hips back toward your ankles, gently stretching the back.    Hold 30 seconds, breathing deeply further into the stretch.    Repeat 2-3 times.          RADHA POSE WITH SIDE STRETCH    Sit back on your heels with knees apart. Reach forward walking your hands on the mat/ground in front of you. You can also walk your hands out to either side while sitting back on your heels to get a stretch on the side of your low back/trunk.    Hold 30 seconds, breathing deeply further into the stretch.    Repeat 2-3 times in each direction.

## 2021-06-20 NOTE — LETTER
Letter by Dixon Donaldson MD at      Author: Dixon Donaldson MD Service: -- Author Type: --    Filed:  Encounter Date: 3/10/2020 Status: (Other)         March 10, 2020     Patient: Juan R Uriostegui   YOB: 1995   Date of Visit: 3/10/2020       To Whom It May Concern:    It is my medical opinion that Juan R Uriostegui missed work due to illness starting 03/07/2020 and should remain out of work through 03/13/2020. She may return to work on 03/14/2020.     If you have any questions or concerns, please don't hesitate to call.    Sincerely,        Electronically signed by Dixon Donaldson MD
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
## 2021-06-21 NOTE — LETTER
Letter by Dixon Donaldson MD at      Author: Dixon Donaldson MD Service: -- Author Type: --    Filed:  Encounter Date: 3/10/2021 Status: (Other)         March 10, 2021     Patient: Juan R Uriostegui   YOB: 1995   Date of Visit: 3/10/2021       To Whom It May Concern:    It is my medical opinion that Juan R Uriostegui will not be able to lift anything greater than 20# from 3/11/21-6/11/21 due to surgery that she had.    If you have any questions or concerns, please don't hesitate to call.    Sincerely,        Electronically signed by Dixon Donaldson MD

## 2021-06-21 NOTE — PROGRESS NOTES
"ASSESSMENT  And plan   1. PTSD (post-traumatic stress disorder)  Patient agrees that she has PTSD she would like to be treated she is seeing psychology again next week.  I started her on Celexa side effects of medication discussed.  She will follow back in 6 weeks.      There are no Patient Instructions on file for this visit.    No orders of the defined types were placed in this encounter.    There are no discontinued medications.    No Follow-up on file.    CHIEF COMPLAINT:  Chief Complaint   Patient presents with     Medication Management       HISTORY OF PRESENT ILLNESS:  Juan R is a 23 y.o. female     Patient is here today for follow-up psychology she states that she believes she has PTSD difficulty sleeping.  She also saw Dr. Garza and had an IUD placed.  She would like to start medication            REVIEW OF SYSTEMS:       Psych positive for increased anxiety positive for problems sleeping otherwise 10 point review of systems negative      PFSH:  Medical and family history reviewed    TOBACCO USE:  History   Smoking Status     Never Smoker   Smokeless Tobacco     Never Used       VITALS:  Vitals:    10/17/18 1419   BP: 104/58   Patient Site: Left Arm   Patient Position: Sitting   Cuff Size: Adult Large   Pulse: 88   Resp: 16   Temp: 97.9  F (36.6  C)   TempSrc: Oral   Weight: (!) 224 lb (101.6 kg)   Height: 5' 3.75\" (1.619 m)     Wt Readings from Last 3 Encounters:   10/17/18 (!) 224 lb (101.6 kg)   10/15/18 (!) 225 lb 12 oz (102.4 kg)   09/21/18 220 lb 9 oz (100 kg)       PHYSICAL EXAM:    Interactive female sitting in exam room no acute distress  Psych oriented x3 well-groomed not agitated engaged in conversation speech is soft  CNS cranial nerves II 12 intact gait normal reflux of brisk  DATA REVIEWED:  Additional History from Old Records Summarized (2): Reviewed psychology notes from Dr. Zarate.  Suggested that she had PTSD and should be placed on appropriate medication.  Reviewed clinic visit notes " with Dr. Garza IUD was placed.  Decision to Obtain Records (1): 0  Radiology Tests Summarized or Ordered (1): 0  Labs Reviewed or Ordered (1): 0  Medicine Test Summarized or Ordered (1): 0  Independent Review of EKG or X-RAY(2 each): 0    The visit lasted a total of 15 minutes face to face with the patient. Over 50% of the time was spent counseling and educating the patient about   ptsd.    MEDICATIONS:  Current Outpatient Prescriptions   Medication Sig Dispense Refill     ibuprofen (ADVIL,MOTRIN) 200 MG tablet Take 2 tablets (400 mg total) by mouth every 6 (six) hours as needed for pain. 60 tablet 0     No current facility-administered medications for this visit.      Please note that this clinical encounter uses voice recognition software, there may be typographical errors present

## 2021-06-21 NOTE — LETTER
Letter by Dixon Donaldson MD at      Author: Dixon Donaldson MD Service: -- Author Type: --    Filed:  Encounter Date: 5/20/2021 Status: (Other)         Juan R Uriostegui  [facility] [place] [address]             May 20, 2021         Dear Ms. Uriostegui,    Below are the results from your recent visit:    Resulted Orders   Comprehensive Metabolic Panel   Result Value Ref Range    Sodium 141 136 - 145 mmol/L    Potassium 4.2 3.5 - 5.0 mmol/L    Chloride 106 98 - 107 mmol/L    CO2 28 22 - 31 mmol/L    Anion Gap, Calculation 7 5 - 18 mmol/L    Glucose 82 70 - 125 mg/dL    BUN 7 (L) 8 - 22 mg/dL    Creatinine 0.76 0.60 - 1.10 mg/dL    GFR MDRD Af Amer >60 >60 mL/min/1.73m2    GFR MDRD Non Af Amer >60 >60 mL/min/1.73m2    Bilirubin, Total 0.3 0.0 - 1.0 mg/dL    Calcium 8.9 8.5 - 10.5 mg/dL    Protein, Total 6.7 6.0 - 8.0 g/dL    Albumin 3.4 (L) 3.5 - 5.0 g/dL    Alkaline Phosphatase 86 45 - 120 U/L    AST 15 0 - 40 U/L    ALT 16 0 - 45 U/L    Narrative    Fasting Glucose reference range is 70-99 mg/dL per  American Diabetes Association (ADA) guidelines.       Test results negative/normal.     Please call with questions or contact us using [employer].    Sincerely,        Electronically signed by Dixon Donaldson MD

## 2021-06-21 NOTE — PROGRESS NOTES
"EDILIA Uriostegui is a 23 y.o. female here for IUD insertion. She is not sure what type she wants to get. She is also due for a Pap and gc/chlamydia testing.     She has one 1.5 year old son. Had a vaginal delivery.   Past Medical History:   Diagnosis Date     Asthma      Depression      Obesity      Current Outpatient Prescriptions on File Prior to Visit   Medication Sig Dispense Refill     ibuprofen (ADVIL,MOTRIN) 800 MG tablet Take 1 tablet (800 mg total) by mouth every 8 (eight) hours as needed for pain. 30 tablet 0     No current facility-administered medications on file prior to visit.      ?  O  /60  Pulse 80  Temp 97.6  F (36.4  C) (Oral)   Resp 16  Ht 5' 3.75\" (1.619 m)  Wt (!) 225 lb 12 oz (102.4 kg)  LMP 09/24/2018 (Approximate)  Breastfeeding? No  BMI 39.05 kg/m2   Vitals reviewed. Nursing note reviewed.  General Appearance: Pleasant and alert, in no acute distress  Pelvic:   Vulva: normal skin.  No lesions noted.  Nontender.    Vagina: normal appearance, physiologic discharge. No evidence of cystocele or rectocele.   Cervix: normal appearance, no lesions, no cervical motion tenderness   Uterus: normal size and position, mobile, non-tender   Skin: warm, dry, intact, no rash noted    A/P  Juan R was seen today for contraception.    Diagnoses and all orders for this visit:    Birth control  -     Pregnancy, Urine    Screen for STD (sexually transmitted disease)  -     Chlamydia trachomatis & Neisseria gonorrhoeae, Amplified Detection    Screening for cervical cancer  -     Gynecologic Cytology (PAP Smear)    Encounter for insertion of mirena IUD: I had an extensive discussion with the patient about Mirena and Paragard IUD's. She would like to have a Mirena inserted. See procedure note below.   -     ibuprofen tablet 800 mg (ADVIL,MOTRIN); Take 4 tablets (800 mg total) by mouth once.  -     ibuprofen (ADVIL,MOTRIN) 200 MG tablet; Take 2 tablets (400 mg total) by mouth every 6 (six) hours as " needed for pain.    PROCEDURE:   Mirena IUD desired today. and Patient was given 800mg ibuprofen prior to procedure.      The patient understands the risks and benefits of intrauterine device insertion and agrees to proceed.      PROCEDURE AND FINDINGS: After appropriate discussion of risks and benefits of IUD placement, written informed consent was obtained.      The patient was placed in the dorsal lithotomy position, and a sterile speculum was inserted. The cervix was then prepped with iodine.    A tenaculum was applied to the anterior lip of the cervix.   A sound was placed through the cervical os in sterile fashion, and the uterus sounded to 7cm. ]  The IUD was loaded in the applicator in the usual fashion and the indicator placed according to the sound.  The applicator was inserted into the cervix and the intrauterine device placed high in the endometrial cavity.  The applicator was withdrawn and the strings trimmed to aprroximately 2-3 cm.  The patient tolerated the procedure well with no complications.    Patient instructed to check for strings monthly.  She is given a card indicating the date of placement and date by which the IUD should be removed.  Follow-up with me in 1 month to verify appropriate placement.    Options for treatment and follow-up care were reviewed with the patient and/or guardian. Juan R Uriostegui and/or guardian engaged in the decision making process and verbalized understanding of the options discussed and agreed with the final plan.    Yuni Garza MD

## 2021-06-21 NOTE — LETTER
Letter by Malorie Crow CMA at      Author: Malorie Crow CMA Service: -- Author Type: --    Filed:  Encounter Date: 2/9/2021 Status: (Other)       [facility] - Surgery Details:    Surgery Date: Monday Feb. 15th   Location:  [place] [id number]   Arrival Time: 9:45 AM (unless instructed otherwise by the pre-op nurse)    Prep Instructions:     1. Pre-op required by PCP.    2. COVID19 testing is required within 4 days of surgery. We have this scheduled for you at the [facility] on Thursday Feb. 11th at 3:30 PM. If your test is positive, your surgery will be canceled.     3. Nothing to eat or drink for 8 hours before surgery unless instructed differently by the pre-op nurse.    4. No blood thinners including aspirin for one week prior to surgery. Verify this is safe for you with your primary care doctor before stopping.     5. Visitor restrictions are in place due to the pandemic. One visitor is allowed for adult surgical patients. Please verify this with the pre-op nurse when they call before surgery because it is subject to change.    6. If you are going home the same day of surgery, you need an adult to drive you home and stay with you 24 hours after surgery.      7. When you arrive to the hospital, you will be screened for COVID19 symptoms. If you screen positive, your surgery will need to be postponed for your safety.    8. The community is experiencing a surge in COVID19 hospital admissions which is impacting bed availability at all hospitals. If you are being admitted overnight or longer following surgery, please be aware that your procedure could be cancelled as a result.     9. We always encourage you to notify your insurance any time you have something scheduled including surgery. The number is usually right on the back of your insurance card. Please call [facility] Cost of Care at [phone] if you need pricing information.       Call our office if you have any  questions! Thank you!         Malorie SILVERMAN  Surgery Scheduler  [facility]  Surgery [facility]  Direct line: [phone]   Main Line: [phone]  Direct Fax: [phone]

## 2021-06-21 NOTE — LETTER
Letter by Dixon Donaldson MD at      Author: Dixon Donaldson MD Service: -- Author Type: --    Filed:  Encounter Date: 2/11/2021 Status: (Other)         February 11, 2021     Patient: Juan R Uriostegui   YOB: 1995   Date of Visit: 2/11/2021       To Whom It May Concern:    It is my medical opinion that Juan R Uriostegui may return to work on 02/16/2021.  Please excuse her absence from work on 02/12/2021 - 02/15/2021.    If you have any questions or concerns, please don't hesitate to call.    Sincerely,        Electronically signed by Dixon Donaldson MD
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
## 2021-06-23 NOTE — PROGRESS NOTES
ASSESSMENT AND PLAN:  1. PTSD (post-traumatic stress disorder)  She is going to group therapy.  The SSRI dose has been doubled she is managing with the medication no side effects noted she will continue it at night.    2. Migraine with aura and without status migrainosus, not intractable  Continues to have a headache which is exacerbated with bright lights.  Is been waking up from sleep.  She had one episode of nausea and emesis today.  The amitriptyline made her somnolent and did not help with her headaches I am discontinuing the medicine and putting her on Topamax side effects discussed in detail also started 25 mg dose and gradually titrate that up I will see her again within 7-10 days.  3. Mood disorder (H)  Electrolytes and hemogram reviewed from her last visit.  She is been tolerating the lithium she is noted that she has not had any mood swings on the medication for the first 2 weeks I will have her continue the current dose.  She still has not seen psychiatry.    4. Other iron deficiency anemia  Hemogram reviewed from last visit her hemoglobin is 10.2 she denies any menstrual bleeding she states that she is fatigued I am asking her to take an iron tablet at bedtime.            No orders of the defined types were placed in this encounter.    Medications Discontinued During This Encounter   Medication Reason     amitriptyline (ELAVIL) 50 MG tablet Alternate therapy     lithium 300 mg tablet Reorder     citalopram (CELEXA) 40 MG tablet Reorder       No Follow-up on file.    CHIEF COMPLAINT:  Chief Complaint   Patient presents with     Follow-up     mental health     Medication     new meds for back pain     Migraine     meds not helping       HISTORY OF PRESENT ILLNESS:  Juan R is a 23 y.o. female who presents to the clinic today for follow up on her mental health. I last saw her on 01/16/2019 at which time Lithium had been started. She states that she is taking all of her medications as instructed without any  "missed doses, and is attending her group therapy sessions. The patient felt that the lithium made her very calm; nothing could make her angry. She is not as calm anymore, but is not as angry as she was prior to starting the medication. Juan R has been very fatigued. She also notes onset of poor appetite with some nausea. The patient eats at most, one full meal per day. She does not think she has lost weight. Her thirst has increased. Juan R did throw up once today, a couple hours into her work shift, and had to leave. She notably did not eat before taking her medications. The patient denies any abdominal pain, diarrhea, or chest pain. She feels \"hot\" like she has a fever, and was was sweating profusely at work that her makeup was coming off. However, her temperature in clinic today is normal.    Her headaches have not improved, and Juan R is having one almost daily since a month ago. They are located on the left side of her head. They can be very severe, and it lasted all day yesterday. Amitriptyline started at her last visit has not helped at all. Nothing seems to help her headache, even sleep. Bright lights exacerbate her headaches. Her grandmother has history of brain tumor with migraines, but there is no other migraine history in her family. She is still having some trapezius pain, but does not think this is related to her headaches. Her blood pressure is normal today, but her pulse is elevated.    I reviewed labs from 01/16/2019 with CBC still showing anemia and low hematocrit, MCV, MCH, and elevated RDW. She is not having heavy periods anymore on IUD.     REVIEW OF SYSTEMS:   General: Positive for fatigue and feeling \"hot\". No fever.  CV: Positive for tachycardia. Negative for chest pain.  GI: Positive for poor appetite, nausea, and one episode of emesis. No abdominal pain or diarrhea.  Musculoskeletal: Positive for left trapezius pain.  : Negative for menorrhagia.  Mental: Positive for decreased " "irritability.  Skin: Positive for diaphoresis.   Neuro: Positive for left-sided headaches with photosensitivity.  All other systems are negative.    PFSH:  Reviewed as below.     TOBACCO USE:  Social History     Tobacco Use   Smoking Status Never Smoker   Smokeless Tobacco Never Used       VITALS:  Vitals:    01/31/19 1449   BP: 106/68   Pulse: (!) 114   Resp: 18   Temp: 97.3  F (36.3  C)   TempSrc: Oral   SpO2: 99%   Weight: (!) 233 lb 3 oz (105.8 kg)   Height: 5' 3.75\" (1.619 m)     Wt Readings from Last 3 Encounters:   01/31/19 (!) 233 lb 3 oz (105.8 kg)   01/16/19 (!) 228 lb (103.4 kg)   11/28/18 (!) 224 lb 3.2 oz (101.7 kg)     Body mass index is 40.34 kg/m .    PHYSICAL EXAM:  General: Alert, cooperative, no distress, appears stated age  Head: Normocephalic, without obvious abnormality, atraumatic, tender to palpation over the left forehead without radiation elsewhere  Nose: Nares normal, septum midline, mucosa normal, no drainage or sinus tenderness  Lungs: Clear to auscultation bilaterally, respirations unlabored  Heart: Regular rate and rhythm, S1 and S2 normal, no murmur, rub, or gallop, tachycardic  Abdomen: Soft, non tender, bowel sounds active all four quadrants, no masses, no organomegaly.  Musculoskeletal: Tender to palpation over the left trapezius  Neurologic:  No tremor, no focal findings.  Normal gait.   Mental status:  Alert and oriented. Not anxious. Well-groomed.    DATA REVIEWED:  Additional History from Old Records Summarized (2): none  Decision to Obtain Records (1): none  Radiology Tests Summarized or Ordered (1): none  Labs Reviewed or Ordered (1): BMP and CBC from 01/16/2019 reviewed, remarkable for anemia as well as low hematocrit, MCV, MCH, and elevated RDW.   Medicine Test Summarized or Ordered (1): none  Independent Review of EKG or X-RAY(2 each): none    The visit lasted a total of 25 minutes face to face with the patient. Over 50% of the time was spent counseling and educating the " patient about her mental health and headaches.    I, Claire Julien, am scribing for and in the presence of, Dr. Donaldson.    I, Dr. Donaldson, personally performed the services described in this documentation, as scribed by Claire Julien in my presence, and it is both accurate and complete.      MEDICATIONS:  Current Outpatient Medications   Medication Sig Dispense Refill     citalopram (CELEXA) 40 MG tablet Take 1 tablet (40 mg total) by mouth daily. 30 tablet 2     ibuprofen (ADVIL,MOTRIN) 200 MG tablet Take 2 tablets (400 mg total) by mouth every 6 (six) hours as needed for pain. 60 tablet 0     lithium 300 mg tablet Take 1 tablet (300 mg total) by mouth 3 (three) times a day. 90 tablet 2     ferrous sulfate 325 (65 FE) MG tablet Take 1 tablet (325 mg total) by mouth 2 (two) times a day. 60 tablet 3     ondansetron (ZOFRAN ODT) 4 MG disintegrating tablet Take 1 tablet (4 mg total) by mouth every 8 (eight) hours as needed 4mg. 10 tablet 0     topiramate (TOPAMAX) 25 MG tablet Take 1 tablet (25 mg total) by mouth 2 (two) times a day. 60 tablet 0     No current facility-administered medications for this visit.        Total Data Points: 1    Please note that this clinical encounter uses voice recognition software, there may be typographical errors present

## 2021-06-23 NOTE — PROGRESS NOTES
ASSESSMENT AND PLAN:  1. PTSD (post-traumatic stress disorder) patient has been taking Celexa.  She notes that it is been working.  However she is going to her therapist and is noticed issues with expansive mood and issues with being argumentative and refilling below.        2. Mood disorder (H) patient's exhibiting [place] episodes of cycling.  She needs to see psychiatry however I am going to place her on a mood stabilizer at this time.  Lithium will be prescribed continue baseline hemogram and BMP today. HM1(CBC and Differential)    Basic Metabolic Panel    HM1 (CBC with Diff)    Ambulatory referral to Psychiatry   3. Occipital neuralgia of left side at patient's been experiencing headaches secondary to the neuralgia from the occipital portion of her scalp.  Amitriptyline started side effects discussed    4.   Trapezius strain      She needs to ice the area at least twice daily to avoid muscle spasm.        CHIEF COMPLAINT:  Chief Complaint   Patient presents with     Other     Medicine check     Labs     check for DM       HISTORY OF PRESENT ILLNESS:  Juan R is a 23 y.o. female presenting with back pain beginning a couple weeks ago. She explains she cannot sleep due to her back pain which is located in her upper back; she describes the pain as a burning sensation. She notes she has been working less due to the pain. She states she has taken ibuprofen and tylenol for her pain which has provided minimal relief. She cannot recall any specific injury. She continues to have left-sided migraines.     She reports she had an episode in which she was angry for four or five days; she adds that she was angry and upset for no reason. She states this was followed by a two-day period where she was perfectly calm. She notes she takes citalopram 40 mg once daily. She has not scheduled a psychiatry visit.    She adds that her family believes she has diabetes because she is always hot, but she does not believe she has  "diabetes.    REVIEW OF SYSTEMS:   Back pain.    All other 10 point review of systems are negative.    PFSH:  History of PTSD. Pertinent past, family, social and medical history reviewed.     TOBACCO USE:  Social History     Tobacco Use   Smoking Status Never Smoker   Smokeless Tobacco Never Used       VITALS:  Vitals:    01/16/19 1346   BP: 104/68   Pulse: 96   Resp: 20   Temp: 97.2  F (36.2  C)   TempSrc: Oral   SpO2: 99%   Weight: (!) 228 lb (103.4 kg)   Height: 5' 3.75\" (1.619 m)     Wt Readings from Last 3 Encounters:   01/16/19 (!) 228 lb (103.4 kg)   11/28/18 (!) 224 lb 3.2 oz (101.7 kg)   11/23/18 220 lb (99.8 kg)     Body mass index is 39.44 kg/m .    QUALITY MEASURES:      PHYSICAL EXAM:  General: Alert, cooperative, no distress, appears stated age  Head: Normocephalic, without obvious abnormality, atraumatic. Tenderness over left occiput.  Eyes: PERRL, conjunctiva/cornea clear, EOM's intact, fundi benign, both eyes  Ears: Normal TM's and external ear canals, both ears  Nose: Nares normal, septum midline, mucosa normal, no drainage or sinus tenderness  Throat: Lips, mucosa, and tongue normal; teeth and gums normal  Musculoskeletal: Tenderness over left deltoid and trapezius muscle.   Lungs: Clear to auscultation bilaterally, respirations unlabored  Chest wall: No tenderness or deformity  Heart: Regular rate and rhythm, S1 and S2 normal, no murmur, rub, or gallop  CVS: No edema noted.   Abdomen: Soft, non tender, bowel sounds active all four quadrants, no masses, no organomegaly.  Neurologic: No tremor, no focal findings.   DTRs are normal and symmetric both proximally and distally BUE and BLE.  Strength testing is normal and symetric both proximally and distally BUE and BLE.  Toes downgoing bilaterally.  Normal gait.   Psych: Oriented x3. Affect normal.     DATA REVIEWED:  Additional History from Old Records Summarized (2): Reviewed progress note by Hannah Zarate from 12/14/2018 for a psych " encounter.  Decision to Obtain Records (1):   Radiology Tests Summarized or Ordered (1):   Labs Reviewed or Ordered (1): Labs ordered.  Medicine Test Summarized or Ordered (1):   Independent Review of EKG or X-RAY(2 each):     The visit lasted a total of 11 minutes face to face with the patient. Over 50% of the time was spent counseling and educating the patient about her back pain and mood.     MEDICATIONS:  Current Outpatient Medications   Medication Sig Dispense Refill     citalopram (CELEXA) 40 MG tablet Take 1 tablet (40 mg total) by mouth daily. 30 tablet 2     ibuprofen (ADVIL,MOTRIN) 200 MG tablet Take 2 tablets (400 mg total) by mouth every 6 (six) hours as needed for pain. 60 tablet 0     ondansetron (ZOFRAN ODT) 4 MG disintegrating tablet Take 1 tablet (4 mg total) by mouth every 8 (eight) hours as needed 4mg. 10 tablet 0     No current facility-administered medications for this visit.        Total Data Points: 3    By signing my name below, IMyah, attbryan that this documentation has been prepared under the direction and in the presence of Dr. Dixon Gaspar.  Electronic Signature: Sarita Gonzales. 1/16/2019 14:00.     I, Dr. Dixon gaspar  personally performed the services described in this documentation. All medical record entries made by the scribe were at my direction and in my presence. I have reviewed the chart and discharge instructions (if applicable) and agree that the record reflects my personal performance and is accurate and complete.      Please note that this clinical encounter uses voice recognition software, there may be typographical errors present

## 2021-06-23 NOTE — PROGRESS NOTES
"Psychology Psychotherapy  Note    Name:  Juan R Uriostegui  :  1995  MRN:  [id number]    Date of Service: 2019  Duration: 50 minutes (2 PM-50 p.m.)    This is a dual signature report.  My supervising clinician is Dr. Magaly Dos Santos.    Target Symptoms:    The patient was seen in light of concerns regarding symptoms of posttraumatic stress disorder and mood dysregulation.    Participation:  The patient was able to participate and benefit from treatment as evidenced by her verbal expression of ideas and initiation of topics discussed.    Mental Status:    Mood:  anxious  Affect:  mood congruent  Suicidal Ideation:  absent  Homicidal Ideation:  absent  Thought process:  normal  Thought content:  Normal  Fund of Knowledge:  Sufficient  Attention/Concentration:  intact  Language ability:  intact  Speech: normal  Memory:  recent and remote memory intact  Insight and Judgement:  fair  Orientation:  person, place, time and situation  Appearance: casually-dressed,   Eye Contact:  Appropriate  Estimated IQ:  Average    Intervention:    Ms. Uriostegui reported to session on time and casually dressed.  She shared her experiences with holiday season and the unique stressors that came from balancing activities with her child's father.  She reported that she was able to reflect on moments where she managed her emotions well, and also was able to share examples of instances where she was unable to manage her emotions.  The therapist and Ms. Uriostegui reviewed facilitators and barriers to each of these instances and explored ways to continue to use \"what works \".    Psychoterapeutic Techniques:  Cognitive-behavioral therapy, motivational interviewing and supportive psychotherapy strategies were utilized.    Necessity:    The session was necessary for the care of the patient to address symptoms of mood dysregulation related to posttraumatic stress disorder.    Progress:    She has shown improvement in her ability to " identify instances of problematic behavior and employee coping strategies to successfully avoid escalation.    Plan:    This writer will continue to meet with the patient on an approximate weekly basis in the outpatient clinic to address mental health symptoms by continuing to utilize cognitive-behavioral and supportive psychotherapy.    Diagnosis:    Post-Traumatic stress disorder    Problem List:  Patient Active Problem List   Diagnosis     Encounter for insertion of mirena IUD     PTSD (post-traumatic stress disorder)     I have read, discussed and agree with the documentation provided by Hannah Zarate MA, Psychology Intern.    Magaly Dos Santos PsyD, ABPP, LP      Performed and documented by: Hannah Zarate MA, pre-doctoral psychology intern  Supervising Clinician: Dr. Magaly Dos Santos  Date:  1/25/2019  Time:  1:21 PM

## 2021-06-24 NOTE — PROGRESS NOTES
ASSESSMENT and plan   1. Pain of finger of right hand patient was seen in urgent care.  She had x-rays of her right hand which proved to be negative for fracture.  I reviewed the visit note.  Continues to have pain in the right hand specifically in the right fourth and fifth digits and on the dorsum of the right hand no abnormalities were noted in the skin.  Hand  are equal.  She has a referred pain radiating to her right wrist.  Going to have her return within 3 weeks to re-radiograph the hand if symptoms persist I have filled out a workability form for her not to use her hand at work she can use her left hand normally.  Also Voltaren has been given for pain she is told not to use ibuprofen.    2. Mood disorder (H) currently she is responding to the use of lithium and her outbursts have become less frequent.  She notes that she is more calm.  She no longer is following up with her therapist because she wants to see a psychiatrist and is awaiting a referral.  At her next visit I will repeat a hemogram.    3. PTSD (post-traumatic stress disorder) on an SSRI no nightmares noted.    4. Chronic migraine without aura without status migrainosus, not intractable Topamax dose will be increased to 25 mg twice daily headache severity is about the same but frequency has decreased to 1 headache every 5-6 days.          There are no Patient Instructions on file for this visit.    No orders of the defined types were placed in this encounter.    Medications Discontinued During This Encounter   Medication Reason     topiramate (TOPAMAX) 25 MG tablet Reorder       No Follow-up on file.    CHIEF COMPLAINT:  Chief Complaint   Patient presents with     Hand injury       HISTORY OF PRESENT ILLNESS:  Juan R is a 24 y.o. female who is here for follow-up.  She is accompanied by her mother and young son.  She reports that she has not worked for the last 5 days because she slammed her right hand on the trunk of a car.  This was not a  "work-related injury.  She is been off work and needs a work form filled out.  She would like to continue working without using her right hand.  She also reports that she went to an urgent care and x-rays proved to be negative.  The pain in her right hand is a stabbing type of burning pain.  It does awaken her from sleep.  Ibuprofen has not helped.    She no longer is following up with her psychologist because all they would do was talk\" feels that her SSRI was not really helped by this.  She does think that the lithium is helping with the mood disorder as she is having less outbursts.  She notes that the headache severity is about the same but the frequency has become less with the Topamax she notes no side effects with this medication.    REVIEW OF SYSTEMS:   Musculoskeletal positive for right hand pain and right wrist pain  Psych negative for symptoms of depression occasional mood swings noted attention span is normal  CNS occasional by temporal headaches are noted she has no symptoms today  According to the patient 10 point review of  All other systems are negative.    PFSH:    Works in a nursing home    TOBACCO USE:  Social History     Tobacco Use   Smoking Status Never Smoker   Smokeless Tobacco Never Used       VITALS:  Vitals:    02/27/19 1442   BP: 104/66   Pulse: (!) 111   Resp: 18   Temp: 97.5  F (36.4  C)   TempSrc: Oral   SpO2: 99%   Weight: (!) 239 lb (108.4 kg)   Height: 5' 3.75\" (1.619 m)     Wt Readings from Last 3 Encounters:   02/27/19 (!) 239 lb (108.4 kg)   01/31/19 (!) 233 lb 3 oz (105.8 kg)   01/16/19 (!) 228 lb (103.4 kg)       PHYSICAL EXAM:  Interactive female sitting [facility] exam room no acute distress  HEENT neck supple mucous membranes moist pupils equal reactive to light  CNS cranial nerves II to XII intact gait is normal reflexes are brisk motor tone is normal  Psych oriented x3 well-groomed not agitated  Musculoskeletal system tenderness elicited when I palpate the dorsum of her right " hand and I palpate the dorsal surface of her fourth and fifth digits.  She has evidence of an old abrasion on her second finger on the right hand.  There is no tenderness over the right wrist.  Flexion extension of the wrist cause her to have a shooting pain into her right forearm.  Neuro cranial nerves II to XII intact and  are equal.  Gross sensation of the dorsum of the right hand is normal.  Tinel's test could not be performed Phalen's test was negative.    DATA REVIEWED:  Additional History from Old Records Summarized (2): 2  Reviewed notes from the urgent care setting neuro vascular exam was normal.  Decision to Obtain Records (1): 0  Radiology Tests Summarized or Ordered (1): 1 x-ray was read as normal with no evidence of a fracture.  Labs Reviewed or Ordered (1): 0  Medicine Test Summarized or Ordered (1): 0  Independent Review of EKG or X-RAY(2 each): 0    The visit lasted a total of 40 minutes face to face with the patient. Over 50% of the time was spent counseling and educating the patient about   Hand pain, bipolar disorder, PTSD and chronic headaches.  15 minutes were spent filling out her restrictions for employment.    MEDICATIONS:  Current Outpatient Medications   Medication Sig Dispense Refill     citalopram (CELEXA) 40 MG tablet Take 1 tablet (40 mg total) by mouth daily. 30 tablet 2     ferrous sulfate 325 (65 FE) MG tablet Take 1 tablet (325 mg total) by mouth 2 (two) times a day. 60 tablet 3     ibuprofen (ADVIL,MOTRIN) 200 MG tablet Take 2 tablets (400 mg total) by mouth every 6 (six) hours as needed for pain. 60 tablet 0     lithium 300 mg tablet Take 1 tablet (300 mg total) by mouth 3 (three) times a day. 90 tablet 2     ondansetron (ZOFRAN ODT) 4 MG disintegrating tablet Take 1 tablet (4 mg total) by mouth every 8 (eight) hours as needed 4mg. 10 tablet 0     topiramate (TOPAMAX) 25 MG tablet Take 1 tablet (25 mg total) by mouth 2 (two) times a day. 60 tablet 2     No current  facility-administered medications for this visit.      Data points 3    Please note that this clinical encounter uses voice recognition software, there may be typographical errors present

## 2021-06-24 NOTE — TELEPHONE ENCOUNTER
Pt went to the urgency room after injuring her hand and wants to be seen by AK only for f/u.    She also wants her son to be seen before he turns 2 on 3/5.  (José Miguel Hardin, MRN [id number]).     Can AK squeeze these 2 in?

## 2021-06-24 NOTE — TELEPHONE ENCOUNTER
We can see them both at 2:40 appt slot. Can you update the notes to state she will be coming in at 2:40 please.   Please remind them to arrive 15 minutes early so we have enough time to see them both at the same time. Thanks!

## 2021-06-24 NOTE — TELEPHONE ENCOUNTER
Who is calling:  patient  Reason for Call:   Patient needs an appointment and needs to be next week and is not able to get in please advise  Date of last appointment with primary care:   2/27/2019  Okay to leave a detailed message: Yes

## 2021-06-24 NOTE — TELEPHONE ENCOUNTER
New Appointment Needed  What is the reason for the visit:  Needs a follow up with hurt hand so she can go back to work. Hamzah kelly is to far out and would like to get seen sooner.  Same Date/Next Day Appt Request  What is the reason for your visit?: follow up her hand    Provider Preference: PCP only  How soon do you need to be seen?: as soon as he can fit her in  Waitlist offered?: No  Okay to leave a detailed message:  Yes

## 2021-06-24 NOTE — TELEPHONE ENCOUNTER
Called pt.  PCP has a noon apt and 240 pm apt.  CMT scheduled pt at noon and child at 240 pm.      Can you fit both in tomorrow at either noon or 240 pm?  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      Let [facility] know.  Thanks.

## 2021-06-24 NOTE — TELEPHONE ENCOUNTER
New Appointment Needed  What is the reason for the visit:    Medication follow up  Provider Preference: PCP only  How soon do you need to be seen?: next week- late afternoon  Waitlist offered?: No  Okay to leave a detailed message:  Yes    Patient stated that Dr. Donaldson wanted to see her within a week in regards to medications.

## 2021-06-24 NOTE — TELEPHONE ENCOUNTER
Patient Returning Call  Reason for call:  Patient calling back to follow up on status  Information relayed to patient:  PCP's Message response below-okay to set up for next week.  Set up appointment for 03-19-19 in reserved spot at 0220 pm.  Patient has additional questions:  No  If YES, what are your questions/concerns:  n/a  Okay to leave a detailed message?: No call back needed

## 2021-06-25 NOTE — PROGRESS NOTES
[facility] Daily Progress     Patient Name: Juan R Uriostegui  Date: 5/27/2021  Visit #: 9/12 (end date 6/15/21)  PTA visit #:  1  Referral Diagnosis: Abdominal wall pain in left lower quadrant  Referring provider: Dixon Donaldson MD  Visit Diagnosis:     ICD-10-CM    1. Left lower quadrant abdominal pain  R10.32    2. Right sided abdominal pain  R10.9    3. Abdominal weakness  R19.8    4. Muscular deconditioning  R29.898    5. History of laparoscopy  Z98.890    6. History of appendectomy  Z90.49          Assessment:     From Evaluation: Pt is a 26 y.o. year old female with chronic right sided abdominal cramping and acute left sided lower quadrant (at incision) pain that started post-appendectomy on 2/15/21.  Patient has difficulty with getting out of chair, getting out of bath, getting off the floor, walking >1 mile, standing >30 min, sitting >30 min secondary to pain. Findings are consistent with myofascial pain.  Nutritionist referral recommended.  Patient appears motivated to participate in Physical Therapy and present with a good Physical Therapy prognosis for resolution of activities limitations.     HEP/POC compliance is  fair .  Patient demonstrates understanding/independence with home program.  Response to Intervention fair to good.   Patient is appropriate to continue with skilled physical therapy intervention, as indicated by initial plan of care.    Goal Status:  Pt. will be independent with home exercise program in : 6 weeks;Progressing toward  Pt. will report decreased intensity, frequency of : Pain;Comment;Progressing toward  Comment:: in right and left abdominals <3/10    status: pain level 8/10 today    Pt will: perform sit to stand from low surface without abdominal pain >2/10 in 10 weeks.   status: improving, sometimes a little difficult  Pt will: sleep without waking up due to pain more then 1x a night in 8 weeks.  status: occasional pain at night, doesn't normally wake due to pain. Goal  "met.  Pt will: get up off the floor without pain >2/10 in 12 weeks.  status:  not known, hasn't tried      Plan / Patient Education:     Continue with initial plan of care.  Progress with home program as tolerated.    Subjective:     Pain Ratin  Having intermittent burning in (L) incision area on a daily basis. Having cramping the last 2 days. Played basketball yesterday. Doing some of her exercises a few times/week. Has been working out at the gym a few times/week, using treadmill and some weight machines. She has generally been more active.     Objective:     Mod tenderness of (L) abdominal incisions with moderate adhesions noted.   Abdominal recruitment is fair/poor, better with cues.   Positional awareness with hands/knees exercises is fair with cues.     Treatment Today     TREATMENT MINUTES COMMENTS   Evaluation     Self-care/ Home management     Manual therapy 45 Induction, indirect, direct techniques utilized as appropriate for optimal tissue release.   MFR/SCS - MFR to (L) abdominal incisions/adhesions, peritoneum, sigmoid colon,    Neuromuscular Re-education     Therapeutic Activity     Therapeutic Exercises 10 Discussed increasing HEP frequency to improve strength, emphasized doing more challenging exercises like quadruped stabilization and planks     Exercises per flow sheet.   Exercises:  Exercise #1: quaduped with leg extension and TA contraction 10 on each side -  Comment #4: cat/cow  5 x   Exercise #5: knee down plank 3 x 10\"       Gait training     Modality__________________                Total 55    Blank areas are intentional and mean the treatment did not include these items.       Priyanka Walls  2021    "

## 2021-06-25 NOTE — PROGRESS NOTES
ASSESSMENT AND PLAN:  1. Pain of right hand  Today I obtained a repeat radiograph of her right hand and found no evidence of a fracture this information was revealed to the patient.  Because she is continuing to have a burning pain that radiates through her wrist and into her forearm without any activity I am going obtain an MRI to look for the possibility of ligamentous injury.  Gabapentin will be started for the pain she was asked to stop her Voltaren.  - XR Hand Right 3 or More VWS; Future  - MR Hand With Contrast Right; Future    2. Other gastritis without hemorrhage, unspecified chronicity    Past history of gastritis noted which may have been exacerbated with the use of Voltaren and ibuprofen she will stop both medications and has put her on ranitidine.    3. Mood disorder (H)    Medications working well continue lithium at current dose    4. PTSD (post-traumatic stress disorder)  Currently not working using Celexa.  Able to sleep.        5.  Chronic migraines    Headache severity has decreased she has one headache per week she is in the medication twice daily.    Orders Placed This Encounter   Procedures     XR Hand Right 3 or More VWS     Standing Status:   Future     Number of Occurrences:   1     Standing Expiration Date:   3/19/2020     Order Specific Question:   Is the patient pregnant?     Answer:   No     Order Specific Question:   Can the procedure be changed per Radiologist protocol?     Answer:   Yes     MR Hand With Contrast Right     Standing Status:   Future     Standing Expiration Date:   3/19/2020     Order Specific Question:   Is the patient pregnant?     Answer:   No     Order Specific Question:   Can the procedure be changed per Radiologist protocol?     Answer:   Yes     Order Specific Question:   If this is a diagnostic procedure, have the patient's age and recent imaging history been considered?     Answer:   Yes     Order Specific Question:   Is the patient claustrophobic and in need of  sedation to complete their MR scan?     Answer:   Yes     Order Specific Question:   Would you like to order oral anxiety medication (Valium/Ativan) for this exam?     Answer:   No - Sedation not necessary     There are no discontinued medications.    No Follow-up on file.    CHIEF COMPLAINT:  Chief Complaint   Patient presents with     Follow-up     R hand     Heartburn       HISTORY OF PRESENT ILLNESS:  Juan R is a 24 y.o. female who presents to the clinic today for follow up on right hand pain. Her right hand pain has not improved since I last saw her for this on 02/27/2019. She almost went to the ED due to excruciating pain in her right hand two days ago. It was so painful that the patient had to pull over from driving. It is a shooting pain like lightning bolts inside of her hand, starting in her pinky finger. It is present with and without movement. There has not been much associated numbness. There is also some pain in her right wrist. Juan R is taking 800 mg of ibuprofen every day without not much pain relief, and is also taking Voltaren which has not helped much. She has tried both ice and heat without improvement in her pain. The patient has been trying to rest her hand. She has been off of work for the past three weeks.     Juan R has had headaches 1-2 times per week since her last visit. She is taking Topamax twice a day.    The patient would like medication for nighttime heartburn which has been intermittently ongoing since her last pregnancy. She does not drink coffee. Soda does not trigger her symptoms. Juan R was on ranitidine in the past, which worked well.    She continues to do well on lithium.    REVIEW OF SYSTEMS:   GI: Heartburn.  Musculoskeletal: Right hand pain.  Neuro: Headaches. No right hand numbness.  All other systems are negative.    PFSH:  Reviewed as below.     TOBACCO USE:  Social History     Tobacco Use   Smoking Status Never Smoker   Smokeless Tobacco Never Used  "      VITALS:  Vitals:    03/19/19 1405   BP: 114/60   Patient Site: Left Arm   Patient Position: Sitting   Cuff Size: Adult Large   Pulse: 96   Resp: 16   Temp: 97.4  F (36.3  C)   TempSrc: Oral   Weight: (!) 244 lb (110.7 kg)   Height: 5' 3.75\" (1.619 m)     Wt Readings from Last 3 Encounters:   03/19/19 (!) 244 lb (110.7 kg)   02/27/19 (!) 239 lb (108.4 kg)   01/31/19 (!) 233 lb 3 oz (105.8 kg)     Body mass index is 42.21 kg/m .    PHYSICAL EXAM:  General: Alert, cooperative, no distress, appears stated age  HEENT: Normocephalic, without obvious abnormality, atraumatic, moist mucous membranes  Eyes: PERRL, conjunctiva/cornea clear, EOM's intact  Extremities: Tender to palpation over the 5th MCP joint of the right hand and over the dorsum of the right hand,  strength is weaker on the right, no swelling noted  Neurologic:  A & O x 3.  No tremor, no focal findings.  Normal gait.     DATA REVIEWED:  Additional History from Old Records Summarized (2): none  Decision to Obtain Records (1): none  Radiology Tests Summarized or Ordered (1): Right hand x-rays ordered today.  Labs Reviewed or Ordered (1): none  Medicine Test Summarized or Ordered (1): none  Independent Review of EKG or X-RAY(2 each): On personal review of right hand x-rays today, there is no evidence of acute fracture or disarticulation of bony surfaces.    I, Claire Julien, am scribing for and in the presence of, Dr. Donaldson.    I, Dr. Donaldson, personally performed the services described in this documentation, as scribed by Claire Julien in my presence, and it is both accurate and complete.      MEDICATIONS:  Current Outpatient Medications   Medication Sig Dispense Refill     citalopram (CELEXA) 40 MG tablet Take 1 tablet (40 mg total) by mouth daily. 30 tablet 2     diclofenac (VOLTAREN) 75 MG EC tablet Take 1 tablet (75 mg total) by mouth 2 (two) times a day. 30 tablet 0     ferrous sulfate 325 (65 FE) MG tablet Take 1 tablet (325 mg total) by mouth 2 (two) " times a day. 60 tablet 3     lithium 300 mg tablet Take 1 tablet (300 mg total) by mouth 3 (three) times a day. 90 tablet 2     ondansetron (ZOFRAN ODT) 4 MG disintegrating tablet Take 1 tablet (4 mg total) by mouth every 8 (eight) hours as needed 4mg. 10 tablet 0     topiramate (TOPAMAX) 25 MG tablet Take 1 tablet (25 mg total) by mouth 2 (two) times a day. 60 tablet 2     gabapentin (NEURONTIN) 300 MG capsule Take 1 capsule (300 mg total) by mouth 2 (two) times a day. 60 capsule 0     ranitidine (ZANTAC) 150 MG tablet Take 1 tablet (150 mg total) by mouth 2 (two) times a day. 60 tablet 1     No current facility-administered medications for this visit.        Total Data Points: 3    Please note that this clinical encounter uses voice recognition software, there may be typographical errors present

## 2021-06-25 NOTE — PROGRESS NOTES
"ASSESSMENT and plan   1. Bipolar 1 disorder (H)  She has started a new job this week she has not had a manic episode at work or at home.  She needs her lithium refilled.  She was unable to connect to the psychiatry office have given her that number I would also like to have outpatient counseling she agrees to this.  - lithium 300 mg tablet; Take 1 tablet (300 mg total) by mouth 4 (four) times a day.  Dispense: 120 tablet; Refill: 1  - AMB REFERRAL TO MENTAL HEALTH AND ADDICTION  - Adult (18+); Outpatient Treatment; Individual/Couples/Family/Group Therapy/Health Psychology; [facility]    2. Mood disorder (H)    He still states that she feels \"and \"horrible\".  She tried using this olanzapine once it made her sleep the rest of the day however she did not have any visual hallucinations at like her to started the medication again at a lower dose.  She will take 2.5 mg daily  - AMB REFERRAL TO MENTAL HEALTH AND ADDICTION  - Adult (18+); Outpatient Treatment; Individual/Couples/Family/Group Therapy/Health Psychology; [facility]    3. PTSD (post-traumatic stress disorder)    No nightmares difficulty sleeping occasional memory issues she is scheduled to see neurology however I will see her again in 4 weeks.  - AMB REFERRAL TO MENTAL HEALTH AND ADDICTION  - Adult (18+); Outpatient Treatment; Individual/Couples/Family/Group Therapy/Health Psychology; [facility]    4. Irritable bowel syndrome with both constipation and diarrhea    Current symptoms are controlled with the Bentyl    5. Pelvic floor dysfunction in female    Taking Lyrica no side effects      There are no Patient Instructions on file for this visit.    Orders Placed This Encounter   Procedures     AMB REFERRAL TO MENTAL HEALTH AND ADDICTION  - Adult (18+); Outpatient Treatment; Individual/Couples/Family/Group Therapy/Health Psychology; [facility]     Referral Priority:   Routine     Referral " "Type:   Behavioral Health     Referral Reason:   Evaluation and Treatment     Requested Specialty:   Behavioral Health     Number of Visits Requested:   1     Medications Discontinued During This Encounter   Medication Reason     lithium 300 mg tablet Reorder     ondansetron (ZOFRAN ODT) 4 MG disintegrating tablet        Return in about 4 weeks (around 6/25/2021) for Next scheduled follow up, depression.    CHIEF COMPLAINT:  Chief Complaint   Patient presents with     Follow-up       HISTORY OF PRESENT ILLNESS:  Juan R is a 26 y.o. female is here for follow-up for mood disorder, bipolar disorder, pelvic floor dysfunction and irritable bowel syndrome.  She reports that she has not heard back from psychiatry and neurology.  She says she started a new job and is managing \"\" okay.  She still gets occasional abdominal pain.  She says she tried to take the olanzapine but it made her very sleepy however she did not notice any visual hallucinations after that.  She states that she still feels that she gets manic at times but has had no visible outbursts no episodes of depression.  Still states that memory is very poor.  Forgets and needs to write things down continuously.  Denies any new headache.  Appetite is normal.  No abdominal pain noted today    REVIEW OF SYSTEMS:   CNS positive for poor short-term memory  Psych positive for feelings of mckinley that are present mainly at home, no visual hallucinations noted for the last 2 weeks.  10 point review of  All other systems are negative.    PFSH:    Social history reviewed    TOBACCO USE:  Social History     Tobacco Use   Smoking Status Never Smoker   Smokeless Tobacco Never Used       VITALS:  Vitals:    05/28/21 1055   BP: 112/68   Pulse: 80   Resp: 20   Temp: 97.8  F (36.6  C)   TempSrc: Oral   Weight: (!) 269 lb 2 oz (122.1 kg)   Height: 5' 4\" (1.626 m)     Wt Readings from Last 3 Encounters:   05/28/21 (!) 269 lb 2 oz (122.1 kg)   05/14/21 (!) 266 lb (120.7 kg)   05/05/21 " (!) 260 lb 9 oz (118.2 kg)       PHYSICAL EXAM:  Interactive female sitting in exam room no acute distress  HEENT neck supple mucous members moist, oral cavity shows no exudate no erythema  Respiratory system clear to auscultation equal breath sounds no wheeze no crackles  CVS regular rate and rhythm no murmurs no rubs no gallops  Psych well-groomed not agitated speech is soft maintains eye contact oriented x3    DATA REVIEWED:  Additional History from Old Records Summarized (2): 0  Decision to Obtain Records (1): 0  Radiology Tests Summarized or Ordered (1): 0  Labs Reviewed or Ordered (1): 0  Medicine Test Summarized or Ordered (1): 0  Independent Review of EKG or X-RAY(2 each): 0    The visit lasted a total of 30 minutes     MEDICATIONS:  Current Outpatient Medications   Medication Sig Dispense Refill     OLANZapine (ZYPREXA) 5 MG tablet Take 1 tablet (5 mg total) by mouth at bedtime. 30 tablet 2     citalopram (CELEXA) 20 MG tablet Take 1 tablet (20 mg total) by mouth daily. 30 tablet 2     dicyclomine (BENTYL) 20 mg tablet Take 1 tablet (20 mg total) by mouth 4 (four) times a day as needed. 100 tablet 5     lidocaine (XYLOCAINE) 5 % ointment Apply to affected area twice daily 120 g 4     lithium 300 mg tablet Take 1 tablet (300 mg total) by mouth 4 (four) times a day. 120 tablet 1     omeprazole (PRILOSEC) 20 MG capsule Take 1 capsule (20 mg total) by mouth daily before breakfast. 30 capsule 11     ondansetron (ZOFRAN) 4 MG tablet Take 1 tablet (4 mg total) by mouth every 8 (eight) hours as needed for nausea. 16 tablet 0     oxyCODONE-acetaminophen (PERCOCET/ENDOCET) 5-325 mg per tablet Take 1 tablet by mouth every 6 (six) hours as needed for pain. 40 tablet 0     pregabalin (LYRICA) 75 MG capsule Take 1 capsule (75 mg total) by mouth 3 (three) times a day. 90 capsule 2     No current facility-administered medications for this visit.

## 2021-06-26 NOTE — PROGRESS NOTES
[facility] Daily Progress    Patient Name: Juan R Uriostegui  Date: 6/10/2021  Visit #: 9/12 (end date 6/15/21)  PTA visit #:  1  Referral Diagnosis: Abdominal wall pain in left lower quadrant  Referring provider: Dixon Donaldson MD  Visit Diagnosis:     ICD-10-CM    1. Left lower quadrant abdominal pain  R10.32    2. Right sided abdominal pain  R10.9    3. Abdominal weakness  R19.8    4. Muscular deconditioning  R29.898    5. History of laparoscopy  Z98.890    6. History of appendectomy  Z90.49          Assessment:     From Evaluation: Pt is a 26 y.o. year old female with chronic right sided abdominal cramping and acute left sided lower quadrant (at incision) pain that started post-appendectomy on 2/15/21.  Patient has difficulty with getting out of chair, getting out of bath, getting off the floor, walking >1 mile, standing >30 min, sitting >30 min secondary to pain. Findings are consistent with myofascial pain.  Nutritionist referral recommended.  Patient appears motivated to participate in Physical Therapy and present with a good Physical Therapy prognosis for resolution of activities limitations.     HEP/POC compliance is  good .  Patient demonstrates understanding/independence with home program.  Response to Intervention good overall.   Patient is benefitting from skilled physical therapy and is making steady progress toward functional goals.  Patient is appropriate to continue with skilled physical therapy intervention, as indicated by initial plan of care.    Goal Status:  Pt. will be independent with home exercise program in : 6 weeks;Progressing toward  Pt. will report decreased intensity, frequency of : Pain;Comment;Progressing toward  Comment:: in right and left abdominals <3/10    status: pain level 9/10 today    Pt will: perform sit to stand from low surface without abdominal pain >2/10 in 10 weeks.   status: improving, sometimes a little difficult  Pt will: sleep without waking up due to pain  "more then 1x a night in 8 weeks.  status: occasional pain at night, doesn't normally wake due to pain. Goal met.  Pt will: get up off the floor without pain >2/10 in 12 weeks.  status: met      Plan / Patient Education:     Continue with initial plan of care.  Progress with home program as tolerated.    Subjective:     Pain Rating: 10  Abdominal pain is bad today. Having burning pain (L) incision. Walked 4 miles at work yesterday, has been lifting/bending a lot. Getting up from lower surfaces is sometimes painful. Has been using a heating pad. Exercises are \"going a lot better\", doing daily.     Objective:     Localized tenderness of (L) medial abdominal incision.   Localized deep fascial restrictions with associated tenderness (L) abdominal wall.   Initial cranial scan is (+) for arterials. Tenderness of (L) rib cage, lower sternum.     Treatment Today     TREATMENT MINUTES COMMENTS   Evaluation     Self-care/ Home management     Manual therapy 55 Induction, indirect, direct techniques utilized as appropriate for optimal tissue release.   MFR/SCS - MFR to (L) abdominal incisions/adhesions, peritoneum, desc colon, (L) LGI-LV, (L) MGI-LV, ST-LV, LGAS-A, TEA-A, gr. Omentum    Neuromuscular Re-education     Therapeutic Activity     Therapeutic Exercises     Gait training     Modality__________________                Total 55    Blank areas are intentional and mean the treatment did not include these items.       Priyanka Walls  6/10/2021    "
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
## 2021-06-27 NOTE — PROGRESS NOTES
Progress Notes by Hannah Zarate at 2018  3:00 PM     Author: Hannah Zarate Service: -- Author Type: Psychology Intern    Filed: 2018  2:30 PM Encounter Date: 2018 Status: Attested    : Hannah Zarate (Psychology Intern) Cosigner: Magaly Dos Santos Psy.D, LP at 2018 11:56 AM    Attestation signed by Magaly Dos Santos Psy.D, LP at 2018 11:56 AM    I have read, discussed and agree with the documentation provided by Hannah Zarate MA, Psychology Intern.  Magaly Dos Santos PsyD, TEO, TOMEKA                  Psychology Psychotherapy  Note    Name:  Juan R Uriostegui  :  1995  MRN:  [id number]    Date of Service:  2018    Duration:  50 minutes (3 - 350pm )    This is a dual signature report.  My supervising clinician is Dr. Magaly Dos Santos.    Target Symptoms:    The patient was seen in light of concerns regarding symptoms of posttraumatic stress disorder and related anxiety and emotional regulation.    Participation:  The patient was able to participate and benefit from treatment as evidenced by her verbal expression of ideas and initiation of topics discussed.    Mental Status:    Mood:  anxious and depressed  Affect:  mood congruent  Suicidal Ideation:  absent  Homicidal Ideation:  absent  Thought process:  normal  Thought content:  Normal  Fund of Knowledge:  Sufficient  Attention/Concentration:  intact  Language ability:  intact  Speech: normal  Memory:  recent and remote memory intact  Insight and Judgement:  age appropriate  Orientation:  person, place, time and situation  Appearance: casually-dressed,   Eye Contact:  Appropriate  Estimated IQ:  Average    Intervention:    Ms. Uriostegui reported that she recently increased her medication due to continued mood dysregulation.  She shared with the therapist that she has the goal of reducing her physiological response to anxiety provoking situations, including communicating with her son's father.  The therapist and  Ms. Uriostegui discussed methods to manage her autonomic nervous system, including deep breathing.  Ms. Uriostegui reported that she would like to continue practice of behavioral interventions to address her sudden onsets of irritability.  She reported that she also feels it is necessary to continue exploring her past experiences in order to better understand what elicits this automatic anger response.      Psychoterapeutic Techniques:  Cognitive-behavioral therapy, motivational interviewing and supportive psychotherapy strategies were utilized.    Necessity:    The session was necessary for the care of the patient to address symptoms of posttraumatic stress disorder, specifically Ms. Uriostegui's episodes of anger and emotional regulation.    Progress:    She has shown improvement in her ability to identify problematic behaviors and be motivated to address their etiology.    Plan:    This writer will continue to meet with the patient on an approximate weekly basis in the outpatient clinic to address mental health symptoms by continuing to utilize cognitive-behavioral and supportive psychotherapy.    Diagnosis:    Posttraumatic stress disorder    Problem List:  Patient Active Problem List   Diagnosis   ? Encounter for insertion of mirena IUD   ? PTSD (post-traumatic stress disorder)     I have read, discussed and agree with the documentation provided by Hannah Zarate MA, Psychology Intern.    Magaly Dos Santos PsyD, ABPP, LP        Performed and documented by: Hannah Zarate MA, psychology intern  Supervising Clinician: Dr. Magaly Dos Santos  Date:  12/7/2018  Time:  2:18 PM

## 2021-06-27 NOTE — PROGRESS NOTES
Progress Notes by Hannah Zarate at 8/7/2019 10:04 AM     Author: Hannah Zarate Service: -- Author Type: Psychology Intern    Filed: 8/7/2019 10:10 AM Encounter Date: 8/7/2019 Status: Attested    : Hannah Zarate (Psychology Intern) Cosigner: Magaly Dos Santos Psy.D, LP at 8/7/2019  8:05 PM    Attestation signed by Magaly Dos Santos Psy.D, LP at 8/7/2019  8:05 PM    I have read, discussed and agree with the documentation provided by Hannah Zarate MA, Psychology Intern.  Magaly Dos Santos PsyD, ABPP, LP                  Discharge Summary    Session Type: Patient is presenting for an Individual session.    Dates of service 10/05/2018 to 1/11/2019 # Sessions completed: 7  Diagnosis at Intake:Posttraumatic Stress Disorder    Diagnosis at Discharge: Posttraumatic Stress Disorder    Progress toward goal 1: Practice emotional regulation  Unmet    Progress toward goal 2: Reduce interpersonal conflict/ reactivity to others  Partially Met     Reason for discharge:Pt dropped out     Prognosis at discharge: goals not completely met, would benefit from continued behavioral therapy    Recommendations and referrals at discharge: Continue working with a therapist on behavioral approaches         Hannah Zarate      8/7/2019  10:05 AM

## 2021-06-27 NOTE — PROGRESS NOTES
Progress Notes by Hannah Zarate at 2018  2:00 PM     Author: Hannah Zarate Service: -- Author Type: Psychology Intern    Filed: 2018 11:47 AM Encounter Date: 2018 Status: Attested    : Hannah Zarate (Psychology Intern) Cosigner: Magaly Dos Santos Psy.D, LP at 2018  4:29 PM    Attestation signed by Magaly Dos Santos Psy.D, LP at 2018  4:29 PM    I have read, discussed and agree with the documentation provided by Hannah Zarate MA, Psychology Intern.  Magaly Dos Santos PsyD, ABPP, TOMEKA                  Psychology Psychotherapy  Note    Name:  Juan R Uriostegui  :  1995  MRN:  [id number]    Date of Service:  18   Duration:  50 minutes (2 - 250)    This is a dual signature report.  My supervising clinician is Dr. Magaly Dos Santos.    Target Symptoms:    The patient was seen in light of concerns regarding symptoms of post traumatic stress.    Participation:  The patient was able to participate and benefit from treatment as evidenced by her verbal expression of ideas and initiation of topics discussed.    Mental Status:    Mood:  euthymic  Affect:  mood congruent  Suicidal Ideation:  absent  Homicidal Ideation:  absent  Thought process:  normal  Thought content:  Normal  Fund of Knowledge:  Sufficient  Attention/Concentration:  intact  Language ability:  intact  Speech: normal  Memory:  recent and remote memory intact  Insight and Judgement:  good  Orientation:  person, place, time and situation  Appearance: casually-dressed,   Eye Contact:  Appropriate  Estimated IQ:  Average    Intervention:    Ms. Uriostegui reported to session on time and was casually dressed. She shared that she has been continuing to monitor her reaction to communication with her son's father, Mikael. She has continually practiced reduce communication, as she has noticed a pattern of inconsistency with Mikael. She shared that she had one conversation with Mikael since last meeting and was  aware that she struggled to contain her emotional reaction. She subsequently made the decision to begin completing work for a formal restraining order for her and her son's emotional protection. The clinician and Ms. Uriostegui discussed her motivation for beginning this paperwork and explored the cyclical pattern of unproductive communication between herself and Mikael. At this time, Ms. Uriostegui believes a restraining order will be the most beneficial thing for her and her son's psychological health. The clinician and Ms. Uriostegui discussed the importance of also practicing frustration tolerance and emotional regulation in conversation. In session Ms. Uriostegui completed a difficult task (writing with her nondominant hand for three minutes) and together they processed what it was like to engage in something that was frustrating and maintain composure. She was assigned to practice this task between meetings and to record her reactions to the frustration, paying specific attention to the methods she uses to regulate her emotions.     Psychoterapeutic Techniques:  Cognitive-behavioral therapy, motivational interviewing and supportive psychotherapy strategies were utilized.    Necessity:    The session was necessary for the care of the patient to address symptoms of anger dysregulation.    Progress:    She has shown improvement in her ability to utilize coping skills to address symptoms of anger dysregulation.    Plan:    This writer will continue to meet with the patient on an approximate weekly basis in the outpatient clinic to address mental health symptoms by continuing to utilize cognitive-behavioral and supportive psychotherapy.    Diagnosis:    Post Traumatic Stress Disorder    Problem List:  Patient Active Problem List   Diagnosis   ? Encounter for insertion of mirena IUD   ? PTSD (post-traumatic stress disorder)     I have read, discussed and agree with the documentation provided by Hannah Zarate MA,  Psychology Intern.    Magaly Dos Santos PsyD, ABPP, LP      Performed and documented by: Hannah Zarate MA, Psychology Intern  Supervising Clinician: Dr. Magaly Dos Santos  Date:  12/14/2018  Time:  4:19 PM
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
## 2021-07-02 ENCOUNTER — OFFICE VISIT - HEALTHEAST (OUTPATIENT)
Dept: PHYSICAL THERAPY | Facility: REHABILITATION | Age: 26
End: 2021-07-02

## 2021-07-02 ENCOUNTER — COMMUNICATION - HEALTHEAST (OUTPATIENT)
Dept: SCHEDULING | Facility: CLINIC | Age: 26
End: 2021-07-02

## 2021-07-02 DIAGNOSIS — Z98.890 HISTORY OF LAPAROSCOPY: ICD-10-CM

## 2021-07-02 DIAGNOSIS — R10.9 RIGHT SIDED ABDOMINAL PAIN: ICD-10-CM

## 2021-07-02 DIAGNOSIS — Z90.49 HISTORY OF APPENDECTOMY: ICD-10-CM

## 2021-07-02 DIAGNOSIS — R10.32 LEFT LOWER QUADRANT ABDOMINAL PAIN: ICD-10-CM

## 2021-07-02 DIAGNOSIS — R19.8 ABDOMINAL WEAKNESS: ICD-10-CM

## 2021-07-02 DIAGNOSIS — R29.898 MUSCULAR DECONDITIONING: ICD-10-CM

## 2021-07-04 NOTE — TELEPHONE ENCOUNTER
Telephone Encounter by Karen Hubbard RN at 7/2/2021 12:10 PM     Author: Karen Hubbard RN Service: -- Author Type: Registered Nurse    Filed: 7/2/2021 12:14 PM Encounter Date: 7/2/2021 Status: Signed    : Karen Hubbard RN (Registered Nurse)       Juan R calls in to report that post dental procedure she is having  Pain and would like some pain medication refilled.  Since her provider did not provide she needs to be  Seen to be evaluated for medication. She is looking for ibuprofen and hydrocodone. Sent to scheduling for appointment.

## 2021-07-06 VITALS
WEIGHT: 269.13 LBS | HEIGHT: 64 IN | TEMPERATURE: 97.8 F | SYSTOLIC BLOOD PRESSURE: 112 MMHG | HEART RATE: 80 BPM | BODY MASS INDEX: 45.95 KG/M2 | DIASTOLIC BLOOD PRESSURE: 68 MMHG | RESPIRATION RATE: 20 BRPM

## 2021-07-06 NOTE — PROGRESS NOTES
Progress Notes by Priyanka Walls PT at 7/2/2021  2:30 PM     Author: Priyanka Walls PT Service: -- Author Type: Physical Therapist    Filed: 7/2/2021  3:43 PM Encounter Date: 7/2/2021 Status: Attested    : Priyanka Walls PT (Physical Therapist) Cosigner: Dixon Donaldson MD at 7/6/2021  2:31 PM    Attestation signed by Dixon Donaldson MD at 7/6/2021  2:31 PM    Thank you                [facility] Re-Certification Request    July 2, 2021      Patient: Juan R Uriostegui  MR Number: [id number]  YOB: 1995  Date of Visit: 7/2/2021  Onset Date:  2/15/21  Date of Eval: 3/23/21    Dear Dr. Donaldson:    As you may recall, we have been seeing Juan R Uriostegui for Physical Therapy of Abdominal wall pain in left lower quadrant.    For therapy to continue, Medicare and/or Medicaid requires periodic physician review of the treatment plan. Please review the summary of the patient's progress and our plan for continued therapy, and verify  that you agree therapy should continue by entering certification dates at the bottom of this note and co-signing this note.    Plan of Care  Authorization / Certification Start Date: 06/15/21  Authorization / Certification End Date: 09/13/21  Authorization / Certification Number of Visits: BLue + adv MA/MA recert required  Communication with: Referral Source  Patient Related Instruction: Nature of Condition;Treatment plan and rationale;Self Care instruction;Basis of treatment;Body mechanics;Posture;Next steps  Times per Week: 1 every 1-2 weeks  Number of Weeks: up to 12  Number of Visits: up to 12 PRN  Discharge Planning: HEP, self management  Precautions / Restrictions : none  Therapeutic Exercise: ROM;Stretching;Strengthening  Neuromuscular Reeducation: posture;core  Manual Therapy: myofascial release;strain counterstrain      Goal  Pt. will be independent with home exercise program in : 6 weeks;Partially met  Pt. will report decreased intensity, frequency of :  Pain;Comment;Partially met  Comment:: in right and left abdominals <3/10    status: pain level 6/10 today    Pt will: perform sit to stand from low surface without abdominal pain >2/10 in 10 weeks.   status: improving, pain 6/10 with squatting  Pt will: sleep without waking up due to pain more then 1x a night in 8 weeks.  status: occasional pain at night, doesn't normally wake due to pain. Goal met.  Pt will: get up off the floor without pain >2/10 in 12 weeks.  status: met        If you have any questions or concerns, please don't hesitate to call.    Sincerely,      Priyanka Walls, PT        Physician recommendation:     ___ Follow therapist's recommendation        ___ Modify therapy      *Physician co-signature indicates they certify the need for these services furnished within this plan and while under their care.      [facility] Daily Progress     Patient Name: Juan R Uriostegui  Date: 7/2/2021  Visit #: 10/12+12 (end date 9/13/21)  PTA visit #:  1  Referral Diagnosis: Abdominal wall pain in left lower quadrant  Referring provider: Dixon Donaldson MD  Visit Diagnosis:     ICD-10-CM    1. Left lower quadrant abdominal pain  R10.32    2. Right sided abdominal pain  R10.9    3. Abdominal weakness  R19.8    4. Muscular deconditioning  R29.898    5. History of laparoscopy  Z98.890    6. History of appendectomy  Z90.49          Assessment:     From Evaluation: Pt is a 26 y.o. year old female with chronic right sided abdominal cramping and acute left sided lower quadrant (at incision) pain that started post-appendectomy on 2/15/21.  Patient has difficulty with getting out of chair, getting out of bath, getting off the floor, walking >1 mile, standing >30 min, sitting >30 min secondary to pain. Findings are consistent with myofascial pain.  Nutritionist referral recommended.  Patient appears motivated to participate in Physical Therapy and present with a good Physical Therapy prognosis for resolution of  activities limitations.     HEP/POC compliance is  good .  Patient demonstrates understanding/independence with home program.  Response to Intervention good. abdominal pain and function are improving.  Patient is benefitting from skilled physical therapy and is making steady progress toward functional goals.  Patient is appropriate to continue with skilled physical therapy intervention, as indicated by initial plan of care.    Goal Status:  Pt. will be independent with home exercise program in : 6 weeks;Partially met  Pt. will report decreased intensity, frequency of : Pain;Comment;Partially met  Comment:: in right and left abdominals <3/10    status: pain level 6/10 today    Pt will: perform sit to stand from low surface without abdominal pain >2/10 in 10 weeks.   status: improving, pain 6/10 with squatting  Pt will: sleep without waking up due to pain more then 1x a night in 8 weeks.  status: occasional pain at night, doesn't normally wake due to pain. Goal met.  Pt will: get up off the floor without pain >2/10 in 12 weeks.  status: met      Plan / Patient Education:     Continue with initial plan of care.  Progress with home program as tolerated.      Recert and updated POC have been completed.   Authorization / Certification Start Date: 06/15/21  Authorization / Certification End Date: 09/13/21  Authorization / Certification Number of Visits: BLue + adv MA/MA recert required  Communication with: Referral Source  Patient Related Instruction: Nature of Condition;Treatment plan and rationale;Self Care instruction;Basis of treatment;Body mechanics;Posture;Next steps  Times per Week: 1 every 1-2 weeks  Number of Weeks: up to 12  Number of Visits: up to 12 PRN  Discharge Planning: HEP, self management  Precautions / Restrictions : none  Therapeutic Exercise: ROM;Stretching;Strengthening  Neuromuscular Reeducation: posture;core  Manual Therapy: myofascial release;strain counterstrain        Subjective:     Pain Rating:  6 tight  Had oral surgery last week, having facial/ear pain. Has to walk a lot for work, about 2.5-3 miles/day, sometimes more. Abdominal tightness with squatting, bending. She tolerates the walking okay. Has been working on the exercises.     Objective:     Abdominal recruitment is fair to good   Mild/Mod tenderness with associated fascial restrictions of (L) abdomen.     Lumbar ROM:  Date:      *Indicate scale AROM AROM AROM   Lumbar Flexion 90%     Lumbar Extension WNL      Right Left Right Left Right Left   Lumbar Sidebending WNL WNL       Lumbar Rotation         Thoracic Flexion      Thoracic Extension      Thoracic Sidebending         Thoracic Rotation                 Treatment Today     TREATMENT MINUTES COMMENTS   Evaluation     Self-care/ Home management     Manual therapy 40 Induction, indirect, direct techniques utilized as appropriate for optimal tissue release.   MFR/SCS - peritoneum, (L) abdominal scar, desc colon, stomach, gr omentum,    Neuromuscular Re-education     Therapeutic Activity     Therapeutic Exercises 15 Reassessed goals, functional status, objective measures,    Gait training     Modality__________________                Total 55    Blank areas are intentional and mean the treatment did not include these items.       Priyanka Walls  7/2/2021

## 2021-07-23 ENCOUNTER — HOSPITAL ENCOUNTER (OUTPATIENT)
Dept: PHYSICAL THERAPY | Facility: REHABILITATION | Age: 26
End: 2021-07-23
Payer: COMMERCIAL

## 2021-07-23 DIAGNOSIS — Z90.49 HISTORY OF APPENDECTOMY: ICD-10-CM

## 2021-07-23 DIAGNOSIS — R29.898 MUSCULAR DECONDITIONING: ICD-10-CM

## 2021-07-23 DIAGNOSIS — R10.9 RIGHT SIDED ABDOMINAL PAIN: ICD-10-CM

## 2021-07-23 DIAGNOSIS — Z98.890 HISTORY OF LAPAROSCOPY: ICD-10-CM

## 2021-07-23 DIAGNOSIS — R19.8 ABDOMINAL WEAKNESS: ICD-10-CM

## 2021-07-23 DIAGNOSIS — R10.32 LEFT LOWER QUADRANT ABDOMINAL PAIN: Primary | ICD-10-CM

## 2021-07-23 PROCEDURE — 97140 MANUAL THERAPY 1/> REGIONS: CPT | Mod: GP | Performed by: PHYSICAL THERAPIST

## 2021-07-23 NOTE — PROGRESS NOTES
07/23/21 1300   Signing Clinician's Name / Credentials   Signing clinician's name / credentials Priyanka Walls, PT   Session Number   Session Number 11/12+12   Progress Note/Recertification   Progress Note Due Date   (on 20th visit)   Recertification Due Date 09/13/21   Goal 1   Goal Description independent with home exercise program in 6 weeks   Goal Progress met. She is compliant with HEP.    Goal 2   Goal Description will report decreased R/L abdominal pain <3/10 in 8 weeks.    Goal Progress partially met. Overall, pain is improving.    Goal 3   Goal Description able to sit/stand from low surface with abdominal pain >2/10 in 10 weeks.    Goal Progress met   Goal 4   Goal Description able to sleep, waking no more than 1x/night in 8 weeks.    Goal Progress met   Subjective Report   Subjective Report Pain 4/10. Doing okay overall. (R) abdominal pain was worse yesterday, improved today. Has been working on the exercises, feels core strength is improving. No pain with getting up from the floor now. Pain comes/goes, seems random. Sometimes overdoes things, sore the next day.    Objective Measure 1   Details Mod tenderness with associated fascial restrictions of (R) abdomen/abdominal wall, sigmoid colon, colon.    Manual Therapy   Manual Therapy: Mobilization, MFR, MLD, friction massage minutes ([id number]) 55   Skilled Intervention MFR, counterstrain   Treatment Detail (L) abdominal wall and scar, peritoneum, gr. omentum, sigmoid, desc colon, sm intestine    Plan   Homework Pt to be aware of pacing activities to prevent flare ups.    Home program cat/cow, knee down plank, quadruped- leg ext, abd set/march, JORDAN, quad stretch, standing lumbar ext   Plan for next session continue with plan of care. Progress with core exercises next session as appropriate. Follow up in 2 weeks or so.    Total Session Time   Timed Code Treatment Minutes 55   Total Treatment Time (sum of timed and untimed services) 55     Outpatient Physical  Therapy Discharge Note     Patient: Juan R Uriostegui  : 1995    Beginning/End Dates of Reporting Period:  3/23/21 to 21    Referring Provider: Dixon Donaldson Diagnosis: see chart     Client Self Report: Pain 10. Doing okay overall. (R) abdominal pain was worse yesterday, improved today. Has been working on the exercises, feels core strength is improving. No pain with getting up from the floor now. Pain comes/goes, seems random. Sometimes overdoes things, sore the next day.     Objective Measurements:     Details: Mod tenderness with associated fascial restrictions of (R) abdomen/abdominal wall, sigmoid colon, colon.     Goals: see note above  Goal Identifier     Goal Description     Target Date     Date Met      Progress (detail required for progress note):     Goal Identifier     Goal Description     Target Date     Date Met      Progress (detail required for progress note):     Goal Identifier     Goal Description     Target Date     Date Met      Progress (detail required for progress note):     Goal Identifier     Goal Description     Target Date     Date Met      Progress (detail required for progress note):     Goal Identifier     Goal Description     Target Date     Date Met      Progress (detail required for progress note):     Goal Identifier     Goal Description     Target Date     Date Met      Progress (detail required for progress note):     Goal Identifier     Goal Description     Target Date     Date Met      Progress (detail required for progress note):     Goal Identifier     Goal Description     Target Date     Date Met      Progress (detail required for progress note):           Plan:  Discharge from therapy.    Discharge:    Reason for Discharge: Patient chooses to discontinue therapy.  Patient has failed to schedule further appointments.    Equipment Issued:       Discharge Plan: Patient to continue home program.

## 2021-08-05 ENCOUNTER — APPOINTMENT (OUTPATIENT)
Dept: RADIOLOGY | Facility: HOSPITAL | Age: 26
End: 2021-08-05
Attending: EMERGENCY MEDICINE
Payer: COMMERCIAL

## 2021-08-05 ENCOUNTER — HOSPITAL ENCOUNTER (EMERGENCY)
Facility: HOSPITAL | Age: 26
Discharge: HOME | End: 2021-08-05
Attending: EMERGENCY MEDICINE | Admitting: EMERGENCY MEDICINE
Payer: COMMERCIAL

## 2021-08-05 VITALS
DIASTOLIC BLOOD PRESSURE: 55 MMHG | RESPIRATION RATE: 18 BRPM | HEART RATE: 91 BPM | BODY MASS INDEX: 44.3 KG/M2 | WEIGHT: 250 LBS | TEMPERATURE: 98 F | SYSTOLIC BLOOD PRESSURE: 104 MMHG | OXYGEN SATURATION: 99 % | HEIGHT: 63 IN

## 2021-08-05 DIAGNOSIS — M25.562 ACUTE PAIN OF LEFT KNEE: ICD-10-CM
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
## 2021-08-05 PROCEDURE — 73502 X-RAY EXAM HIP UNI 2-3 VIEWS: CPT

## 2021-08-05 PROCEDURE — 99285 EMERGENCY DEPT VISIT HI MDM: CPT

## 2021-08-05 PROCEDURE — 250N000013 HC RX MED GY IP 250 OP 250 PS 637: Performed by: EMERGENCY MEDICINE

## 2021-08-05 PROCEDURE — 73590 X-RAY EXAM OF LOWER LEG: CPT | Mod: LT

## 2021-08-05 PROCEDURE — 73560 X-RAY EXAM OF KNEE 1 OR 2: CPT | Mod: LT

## 2021-08-05 RX ORDER — OXYCODONE HYDROCHLORIDE 5 MG/1
10 TABLET ORAL ONCE
Status: COMPLETED | OUTPATIENT
Start: 2021-08-05 | End: 2021-08-05

## 2021-08-05 RX ADMIN — OXYCODONE HYDROCHLORIDE 10 MG: 5 TABLET ORAL at 22:35

## 2021-08-05 ASSESSMENT — MIFFLIN-ST. JEOR: SCORE: 1843.12

## 2021-08-05 NOTE — Clinical Note
Juan R Uriostegui was seen and treated in our emergency department on 8/5/2021.  She may return to work on 08/08/2021.       If you have any questions or concerns, please don't hesitate to call.      Osman Gann MD

## 2021-08-06 ENCOUNTER — NURSE TRIAGE (OUTPATIENT)
Dept: NURSING | Facility: CLINIC | Age: 26
End: 2021-08-06
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
## 2021-08-06 NOTE — DISCHARGE INSTRUCTIONS
Your x-rays today are reassuring.  Follow-up with your primary care doctor.  Take Tylenol and ibuprofen for your pain.  Use ice packs for pain and swelling.    Elevate the leg when resting.    Return to the ED if symptoms worsen or new arise
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
## 2021-08-10 ENCOUNTER — NURSE TRIAGE (OUTPATIENT)
Dept: NURSING | Facility: CLINIC | Age: 26
End: 2021-08-10
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
## 2021-08-11 ENCOUNTER — VIRTUAL VISIT (OUTPATIENT)
Dept: FAMILY MEDICINE | Facility: CLINIC | Age: 26
End: 2021-08-11
Payer: COMMERCIAL

## 2021-08-11 DIAGNOSIS — G47.09 OTHER INSOMNIA: ICD-10-CM

## 2021-08-11 DIAGNOSIS — M79.622 PAIN OF LEFT UPPER ARM: ICD-10-CM

## 2021-08-11 DIAGNOSIS — G44.209 TENSION HEADACHE: ICD-10-CM
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
## 2021-08-11 DIAGNOSIS — M79.662 PAIN OF LEFT LOWER LEG: ICD-10-CM

## 2021-08-11 PROCEDURE — 99214 OFFICE O/P EST MOD 30 MIN: CPT | Mod: 95 | Performed by: FAMILY MEDICINE

## 2021-08-11 RX ORDER — DICLOFENAC SODIUM 75 MG/1
75 TABLET, DELAYED RELEASE ORAL 2 TIMES DAILY
Qty: 60 TABLET | Refills: 1 | Status: SHIPPED | OUTPATIENT
Start: 2021-08-11 | End: 2021-08-20

## 2021-08-11 NOTE — LETTER
[address]  [place] [address]  Phone: [phone]  Fax: [phone]    August 11, 2021        Juan R Uriostegui  [address] [place] [address]          To whom it may concern:    RE: Juan R Uriostegui    Patient may return to work with the following:  Juan R may only work 4 hour shifts for 2 weeks.  These restrictions are in place starting August 11, 2021 - August 25, 2021.    Please contact me for questions or concerns.      Sincerely,        Dixon Donaldson MD
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
## 2021-08-20 ENCOUNTER — TELEPHONE (OUTPATIENT)
Dept: FAMILY MEDICINE | Facility: CLINIC | Age: 26
End: 2021-08-20

## 2021-08-20 ENCOUNTER — OFFICE VISIT (OUTPATIENT)
Dept: FAMILY MEDICINE | Facility: CLINIC | Age: 26
End: 2021-08-20
Payer: COMMERCIAL

## 2021-08-20 VITALS
RESPIRATION RATE: 18 BRPM | TEMPERATURE: 98 F | OXYGEN SATURATION: 98 % | SYSTOLIC BLOOD PRESSURE: 116 MMHG | HEART RATE: 97 BPM | DIASTOLIC BLOOD PRESSURE: 76 MMHG

## 2021-08-20 DIAGNOSIS — R53.83 OTHER FATIGUE: ICD-10-CM

## 2021-08-20 DIAGNOSIS — F31.9 BIPOLAR 1 DISORDER (H): Primary | ICD-10-CM

## 2021-08-20 DIAGNOSIS — M25.512 CHRONIC LEFT SHOULDER PAIN: ICD-10-CM

## 2021-08-20 DIAGNOSIS — M79.622 PAIN OF LEFT UPPER ARM: ICD-10-CM

## 2021-08-20 DIAGNOSIS — G89.29 CHRONIC LEFT SHOULDER PAIN: ICD-10-CM

## 2021-08-20 DIAGNOSIS — G44.209 TENSION HEADACHE: ICD-10-CM

## 2021-08-20 DIAGNOSIS — M79.662 PAIN OF LEFT LOWER LEG: ICD-10-CM

## 2021-08-20 PROCEDURE — 99215 OFFICE O/P EST HI 40 MIN: CPT | Performed by: FAMILY MEDICINE

## 2021-08-20 RX ORDER — DICLOFENAC SODIUM 75 MG/1
75 TABLET, DELAYED RELEASE ORAL 2 TIMES DAILY
Qty: 60 TABLET | Refills: 1 | Status: SHIPPED | OUTPATIENT
Start: 2021-08-20 | End: 2023-06-06

## 2021-08-20 NOTE — TELEPHONE ENCOUNTER
Additional work note is available on her chart he gives her the weekend off and is set the date for October September 15, she will work 4 hours per shift up till this time

## 2021-08-20 NOTE — TELEPHONE ENCOUNTER
Reason for Call:  Other work restrictions    Detailed comments: Caller states her employer will not accept the letter from Dr. Donaldson today regarding work restrictions. She has to work every other weekends but if Dr. Donaldson wants her to have this weekend off, letter needs to specifically state 8/21 and 8/22. Employer also needs return date sooner than 10/30. What's a sooner date pt can return to work? Please fax updated letter to [phone].    Phone Number Patient can be reached at: Cell number on file:    Telephone Information:   Mobile [phone]       Best Time: anytime    Can we leave a detailed message on this number? YES    Call taken on 8/20/2021 at 11:56 AM by Al Orellana

## 2021-08-20 NOTE — TELEPHONE ENCOUNTER
Spoke to patient to notify her that form is ready.  Request I fax it to [phone].    Done   Tania Mahoney  08/20/2021

## 2021-08-20 NOTE — LETTER
[facility]  [address]  [place] [address]  [phone]      August 20, 2021      RE: Juan R Uriostegui  [address] [address] [place] [address]       To whom it may concern:    Juan R Uriostegui was seen in our clinic today.  She may return to work with the following: No restrictions on or August 23 2021.  She missed work on August 21 and August 22, 2021    She cannot work more than 4 hours/day until September 15th    Sincerely,      Dixon Donaldson MD

## 2021-08-20 NOTE — LETTER
[address]  [place] [address]  Phone: [phone]  Fax: [phone]    August 20, 2021        Juan R Uriostegui  [address] [place] [address]          To whom it may concern:    RE: Juan R Uriostegui    When the patient returns to work, the following restrictions apply until 10/30/2021:  Patient can only work 5 days a week, maximum of 4 hours per shift. Can not work on weekends.     Please contact me for questions or concerns.      Sincerely,        Dixon Donaldson MD

## 2021-08-20 NOTE — PATIENT INSTRUCTIONS
It was a pleasure to see you in the office today      Please note I do not want you to work on weekends    Your work restrictions should remain in place    Please come in on Monday to draw a lithium level you do not need to be fasting    Please take your medications for your headache and shoulder pain as directed      Please take your lithium regularly
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
## 2021-08-27 NOTE — ADDENDUM NOTE
Encounter addended by: Priyanka Walls, PT on: 8/27/2021 4:24 PM   Actions taken: Episode resolved, Clinical Note Signed

## 2021-09-15 ENCOUNTER — TELEPHONE (OUTPATIENT)
Dept: FAMILY MEDICINE | Facility: CLINIC | Age: 26
End: 2021-09-15

## 2021-09-15 NOTE — TELEPHONE ENCOUNTER
Patient would like for Dixon Donaldson to write her a return to work letter, but to put no restrictions on it. Caller is wanting to return as soon as possible. Caller stated that she will like to come  in office once its been done.

## 2021-09-15 NOTE — TELEPHONE ENCOUNTER
The chiropractor can give her the note, if I have to clear back to work she will need to see me please let her know this
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
## 2021-09-19 ENCOUNTER — HOSPITAL ENCOUNTER (EMERGENCY)
Facility: HOSPITAL | Age: 26
Discharge: HOME | End: 2021-09-19
Attending: EMERGENCY MEDICINE | Admitting: EMERGENCY MEDICINE
Payer: COMMERCIAL

## 2021-09-19 VITALS
DIASTOLIC BLOOD PRESSURE: 79 MMHG | HEART RATE: 100 BPM | HEIGHT: 64 IN | WEIGHT: 250 LBS | TEMPERATURE: 98.4 F | BODY MASS INDEX: 42.68 KG/M2 | SYSTOLIC BLOOD PRESSURE: 134 MMHG | OXYGEN SATURATION: 94 % | RESPIRATION RATE: 16 BRPM

## 2021-09-19 DIAGNOSIS — T50.Z95A ADVERSE EFFECT OF VACCINE, INITIAL ENCOUNTER: ICD-10-CM

## 2021-09-19 LAB — SARS-COV-2 RNA RESP QL NAA+PROBE: NEGATIVE

## 2021-09-19 PROCEDURE — 99283 EMERGENCY DEPT VISIT LOW MDM: CPT

## 2021-09-19 PROCEDURE — C9803 HOPD COVID-19 SPEC COLLECT: HCPCS

## 2021-09-19 PROCEDURE — 87635 SARS-COV-2 COVID-19 AMP PRB: CPT | Performed by: EMERGENCY MEDICINE

## 2021-09-19 PROCEDURE — 250N000013 HC RX MED GY IP 250 OP 250 PS 637: Performed by: EMERGENCY MEDICINE

## 2021-09-19 RX ORDER — ACETAMINOPHEN 325 MG/1
650 TABLET ORAL ONCE
Status: COMPLETED | OUTPATIENT
Start: 2021-09-19 | End: 2021-09-19

## 2021-09-19 RX ADMIN — ACETAMINOPHEN 650 MG: 325 TABLET ORAL at 17:40

## 2021-09-19 ASSESSMENT — MIFFLIN-ST. JEOR: SCORE: 1858.99

## 2021-09-19 NOTE — ED TRIAGE NOTES
Pt had a covid vaccine #1 on Friday. On Saturday, she developed a coughm sob, sneezing ha and dizziness. Fingers feel numb in both hands. Pt is concerned she has covid

## 2021-09-19 NOTE — DISCHARGE INSTRUCTIONS
Her reaction to the vaccination shows that you have a good immune response.  Expect symptoms to improve over the next few days.  Take Tylenol for discomfort.

## 2021-09-19 NOTE — ED PROVIDER NOTES
"EMERGENCY DEPARTMENT ENCOUNTER      NAME: Juan R Uriostegui  AGE: 26 year old female  YOB: 1995  MRN: [id number]  EVALUATION DATE & TIME: 9/19/2021  4:24 PM    PCP: Dixon Donaldson    ED PROVIDER: Sanjeev Arango M.D.      Chief Complaint   Patient presents with     Covid Concern         FINAL IMPRESSION:  Vaccination reaction      ED COURSE & MEDICAL DECISION MAKING:    Pertinent Labs & Imaging studies reviewed. (See chart for details)  26 year old female presents to the Emergency Department for evaluation of general malaise, cough.  Patient was feeling well when she got her first Covid vaccination on Friday.  Last night she began having general achiness and slight cough.  She reports \"I just feel awful\".  She denies any significant vomiting or diarrhea.  No underlying pulmonary diseases.  On exam she is an obese female in mild distress.  Vital signs remarkable for slight tachycardia otherwise unremarkable.  Oxygenation excellent.  Lungs are clear.  Card exam unremarkable.  Patient likely with robust immune response to vaccination.  Covid swab has been obtained from triage.  Awaiting results.. Patient appears non toxic with stable vitals signs.  4:30 PM I met with the patient for the initial interview and physical examination. Discussed plan for treatment and workup in the ED.   5:30 PM.  Updated patient on negative Covid results. Discussed plan for discharge and she is agreeable.     At the conclusion of the encounter I discussed the results of all of the tests and the disposition. The questions were answered and return precautions provided. The patient or family acknowledged understanding and was agreeable with the care plan.       PPE: Provider wore gloves, N95 mask, eye protection, surgical cap, and paper mask.     MEDICATIONS GIVEN IN THE EMERGENCY:  Medications - No data to display    NEW PRESCRIPTIONS STARTED AT TODAY'S ER VISIT  New Prescriptions    No medications on file      "     =================================================================    HPI    Patient information was obtained from: Patient    Use of Intrepreter: N/A         Juan R Uriostegui is a 26 year old female with a pertient medical history of asthma, chronic migraines, IBS, bipolar I disorder, depression, obesity, who presents to the ED for evaluation of cough.    Patient had a COVID-19 vaccine 2 days ago and developed a slight cough, shortness of breath, headache, and body aches last night. No associated fevers. No reported palliating or provoking factors. She denies any other concerns at this time.      REVIEW OF SYSTEMS   Constitutional:  Denies fever, chills. Positive for body aches  Respiratory:  Positive for cough and shortness of breath  Cardiovascular:  Denies chest pain, palpitations  GI:  Denies abdominal pain, nausea, vomiting, or change in bowel or bladder habits   Musculoskeletal:  Denies any new muscle/joint swelling  Skin:  Denies rash   Neurologic:  Denies focal weakness. Positive for headache  All systems negative except as marked.     PAST MEDICAL HISTORY:  Past Medical History:   Diagnosis Date     Asthma      Bipolar 1 disorder (H)      Depression      IBS (irritable bowel syndrome)      Obesity        PAST SURGICAL HISTORY:  Past Surgical History:   Procedure Laterality Date     IA LAP,APPENDECTOMY N/A 2/15/2021    Procedure: APPENDECTOMY, LAPAROSCOPIC, Excision of epiploic appendagitis;  Surgeon: Bonnie Kaur MD;  [place] [facility] [place]: [place]     TONSILLECTOMY           CURRENT MEDICATIONS:    No current facility-administered medications for this encounter.    Current Outpatient Medications:      citalopram (CELEXA) 20 MG tablet, [CITALOPRAM (CELEXA) 20 MG TABLET] Take 1 tablet (20 mg total) by mouth daily. (Patient not taking: Reported on 8/20/2021), Disp: 30 tablet, Rfl: 2     diclofenac (VOLTAREN) 1 % topical gel, Apply 2 g topically 2 times daily, Disp: 350 g, Rfl:  1     diclofenac (VOLTAREN) 75 MG EC tablet, Take 1 tablet (75 mg) by mouth 2 times daily, Disp: 60 tablet, Rfl: 1     dicyclomine (BENTYL) 20 mg tablet, [DICYCLOMINE (BENTYL) 20 MG TABLET] Take 1 tablet (20 mg total) by mouth 4 (four) times a day as needed., Disp: 100 tablet, Rfl: 5     lidocaine (XYLOCAINE) 5 % ointment, [LIDOCAINE (XYLOCAINE) 5 % OINTMENT] Apply to affected area twice daily (Patient not taking: Reported on 8/20/2021), Disp: 120 g, Rfl: 4     lithium 300 mg tablet, [LITHIUM 300 MG TABLET] Take 1 tablet (300 mg total) by mouth 4 (four) times a day., Disp: 120 tablet, Rfl: 1     OLANZapine (ZYPREXA) 5 MG tablet, [OLANZAPINE (ZYPREXA) 5 MG TABLET] Take 1 tablet (5 mg total) by mouth at bedtime. (Patient not taking: Reported on 8/20/2021), Disp: 30 tablet, Rfl: 2     omeprazole (PRILOSEC) 20 MG capsule, [OMEPRAZOLE (PRILOSEC) 20 MG CAPSULE] Take 1 capsule (20 mg total) by mouth daily before breakfast., Disp: 30 capsule, Rfl: 11     ondansetron (ZOFRAN) 4 MG tablet, [ONDANSETRON (ZOFRAN) 4 MG TABLET] Take 1 tablet (4 mg total) by mouth every 8 (eight) hours as needed for nausea. (Patient not taking: Reported on 8/20/2021), Disp: 16 tablet, Rfl: 0     oxyCODONE-acetaminophen (PERCOCET/ENDOCET) 5-325 mg per tablet, [OXYCODONE-ACETAMINOPHEN (PERCOCET/ENDOCET) 5-325 MG PER TABLET] Take 1 tablet by mouth every 6 (six) hours as needed for pain. (Patient not taking: Reported on 8/20/2021), Disp: 40 tablet, Rfl: 0     pregabalin (LYRICA) 75 MG capsule, [PREGABALIN (LYRICA) 75 MG CAPSULE] Take 1 capsule (75 mg total) by mouth 3 (three) times a day., Disp: 90 capsule, Rfl: 2    ALLERGIES:  No Known Allergies    FAMILY HISTORY:  History reviewed. No pertinent family history.    SOCIAL HISTORY:   Social History     Socioeconomic History     Marital status: Single     Spouse name: None     Number of children: None     Years of education: None     Highest education level: None   Occupational History     None  "  Tobacco Use     Smoking status: Never Smoker     Smokeless tobacco: Never Used   Substance and Sexual Activity     Alcohol use: No     Drug use: Never     Sexual activity: Not Currently     Partners: Male     Birth control/protection: None   Other Topics Concern     None   Social History Narrative    Works at a nursing home.  Has 1 3-year-old son.     Social Determinants of Health     Financial Resource Strain:      Difficulty of Paying Living Expenses:    Food Insecurity:      Worried About Running Out of Food in the Last Year:      Ran Out of Food in the Last Year:    Transportation Needs:      Lack of Transportation (Medical):      Lack of Transportation (Non-Medical):    Physical Activity:      Days of Exercise per Week:      Minutes of Exercise per Session:    Stress:      Feeling of Stress :    Social Connections:      Frequency of Communication with Friends and Family:      Frequency of Social Gatherings with Friends and Family:      Attends [employer] Services:      Active Member of Clubs or Organizations:      Attends Club or Organization Meetings:      Marital Status:    Intimate Partner Violence:      Fear of Current or Ex-Partner:      Emotionally Abused:      Physically Abused:      Sexually Abused:        VITALS:  Patient Vitals for the past 24 hrs:   BP Temp Temp src Pulse Resp SpO2 Height Weight   09/19/21 1506 132/68 98.4  F (36.9  C) Oral 113 12 96 % 1.626 m (5' 4\") 113.4 kg (250 lb)        PHYSICAL EXAM    Constitutional:  Awake, alert, in mild apparent distress  HENT:  Normocephalic, Atraumatic. Bilateral external ears normal. Oropharynx moist. Nose normal. Neck- Normal range of motion with no guarding, No midline cervical tenderness, Supple, No stridor.   Eyes:  PERRL, EOMI with no signs of entrapment, Conjunctiva normal, No discharge.   Respiratory:  Normal breath sounds, No respiratory distress, No wheezing. Bronchitic cough  Cardiovascular: Tachycardia, Normal rhythm, No appreciable rubs or " gallops.   GI:  Soft, No tenderness, No distension, No palpable masses  Musculoskeletal:  Intact distal pulses, No edema. Good range of motion in all major joints. No tenderness to palpation or major deformities noted.  Integument:  Warm, Dry, No erythema, No rash.   Neurologic:  Alert & oriented, Normal motor function, Normal sensory function, No focal deficits noted.   Psychiatric:  Affect normal, Judgment normal, Mood normal.     LAB:  All pertinent labs reviewed and interpreted.  Results for orders placed or performed during the hospital encounter of 09/19/21   Symptomatic COVID-19 Virus (Coronavirus) by PCR Nasopharyngeal    Specimen: Nasopharyngeal; Swab   Result Value Ref Range    SARS CoV2 PCR Negative Negative       RADIOLOGY:  Reviewed all pertinent imaging. Please see official radiology report.  No orders to display         I, Sugey Montiel, am serving as a scribe to document services personally performed by Sanjeev Arango MD, based on my observation and the provider's statements to me. I, Sanjeev Arango MD attest that Sugey Montiel is acting in a scribe capacity, has observed my performance of the services and has documented them in accordance with my direction.    Sanjeev Arango M.D.  Emergency Medicine  [facility] EMERGENCY DEPARTMENT       Sanejev Arango MD  09/19/21 2692

## 2021-09-21 ENCOUNTER — OFFICE VISIT (OUTPATIENT)
Dept: FAMILY MEDICINE | Facility: CLINIC | Age: 26
End: 2021-09-21
Payer: COMMERCIAL

## 2021-09-21 VITALS
SYSTOLIC BLOOD PRESSURE: 122 MMHG | RESPIRATION RATE: 17 BRPM | HEART RATE: 104 BPM | OXYGEN SATURATION: 98 % | TEMPERATURE: 98.2 F | DIASTOLIC BLOOD PRESSURE: 68 MMHG

## 2021-09-21 DIAGNOSIS — G44.209 TENSION HEADACHE: ICD-10-CM

## 2021-09-21 DIAGNOSIS — F43.10 PTSD (POST-TRAUMATIC STRESS DISORDER): ICD-10-CM

## 2021-09-21 DIAGNOSIS — K58.2 IRRITABLE BOWEL SYNDROME WITH BOTH CONSTIPATION AND DIARRHEA: ICD-10-CM

## 2021-09-21 DIAGNOSIS — T50.Z95D: ICD-10-CM

## 2021-09-21 DIAGNOSIS — F31.9 BIPOLAR 1 DISORDER (H): Primary | ICD-10-CM

## 2021-09-21 LAB — LITHIUM SERPL-SCNC: 0.3 MMOL/L

## 2021-09-21 PROCEDURE — 36415 COLL VENOUS BLD VENIPUNCTURE: CPT | Performed by: FAMILY MEDICINE

## 2021-09-21 PROCEDURE — 80178 ASSAY OF LITHIUM: CPT | Performed by: FAMILY MEDICINE

## 2021-09-21 PROCEDURE — 99214 OFFICE O/P EST MOD 30 MIN: CPT | Performed by: FAMILY MEDICINE

## 2021-09-21 NOTE — LETTER
[address]  [place] [address]  Phone: [phone]  Fax: [phone]    September 21, 2021        Juan R Mahan  [address] [place] [address]          To whom it may concern:    RE: Juan R Mahan    Patient was seen and treated today at our clinic and missed work. Please excuse her absence on 9/21/2021.  Patient may return to work 9/22/2021 with the following:Phil may not work no more then 40 hrs/week 8 hours a day.    Please contact me for questions or concerns.      Sincerely,        Dixon Donaldson MD

## 2021-09-21 NOTE — PROGRESS NOTES
"ASSESSMENT:  1. Bipolar 1 disorder (H)  She is remembering to take her lithium she admits that some days she does forget.  She still thinks \"\" that her bipolar disorder is quite bad.  We will check a lithium level today she understands that if the level is low we will need to increase the medication dose.  - Lithium level    2. PTSD (post-traumatic stress disorder)    Has not been taking Celexa I suggested that she start again at 10 mg/day the medication does make her somnolent.  She requests a work note to return to work    3. Tension headache    No headaches noted.  No dizziness noted.  She is sleeping well    4. Irritable bowel syndrome with both constipation and diarrhea    With the Bentyl she has not noticed the abdominal discomfort.    5. Immunization reaction, subsequent encounter    ED records reviewed.  She reports that her arm is no longer sore and she will proceed with the second more during immunization in approximately 2 and half weeks.        There are no Patient Instructions on file for this visit.    No orders of the defined types were placed in this encounter.    There are no discontinued medications.    No follow-ups on file.    CHIEF COMPLAINT:  chief complaint    HISTORY OF PRESENT ILLNESS:  Juan R is a 26 year old female   Who is here following an ER visit earlier this week for a reaction to the maternal vaccination she received that at her workplace and began feeling ill after that she had fever chills extreme weakness and breathing issues she was evaluated in the [facility] ED.  She reports that she still feels slightly tired but wants to go back to work and does not feel like she has a fever at this time.  She states that her back pain is improved and she is more worried about her bipolar disorder she says that the only she is taking lithium she is wondering if it is working well.  She would like to proceed with her lithium level checked today as she did not do that her last visit her  " states that she needs to come in and that is why she made this appointment today.  She denies any feelings of low self-esteem.  She does admit that her mind does race at night and then she remembered that she was not taking the Celexa.  Her bowel problems have improved with the use of the Bentyl and she takes that daily    REVIEW OF SYSTEMS:     Psych positive for episodes of expansive mood.  Denies any depression.  10 point review of  All other systems are negative.    PFSH:    Social history reviewed    TOBACCO USE:  History   Smoking Status     Never Smoker   Smokeless Tobacco     Never Used       VITALS:  Vitals:    09/21/21 1332   BP: 122/68   Pulse: 104   Resp: 17   Temp: 98.2  F (36.8  C)   TempSrc: Oral   SpO2: 98%     Wt Readings from Last 3 Encounters:   09/19/21 113.4 kg (250 lb)   08/05/21 113.4 kg (250 lb)   05/28/21 122.1 kg (269 lb 2 oz)       PHYSICAL EXAM:    Interactive female sitting comfortably in exam room no acute distress  Psych oriented x3 well-groomed not agitated no psychomotor retardation noted  Respiratory system clear to auscultation equal breath sounds no wheezes no crackles  CVS regular rate and rhythm no murmurs no rubs no gallops appreciated  Abdomen soft with no focal tenderness bowel sounds are present no organomegaly  Neuro cranial nerves II to XII intact gait is normal reflexes are brisk    DATA REVIEWED:  Additional History from Old Records Summarized (2): 0  Decision to Obtain Records (1): 0  Radiology Tests Summarized or Ordered (1): 0  Labs Reviewed or Ordered (1): 0  Medicine Test Summarized or Ordered (1): 0  Independent Review of EKG or X-RAY(2 each): 0    The visit lasted a total of  30 minutes.    MEDICATIONS:  Current Outpatient Medications   Medication Sig Dispense Refill     citalopram (CELEXA) 20 MG tablet [CITALOPRAM (CELEXA) 20 MG TABLET] Take 1 tablet (20 mg total) by mouth daily. (Patient not taking: Reported on 8/20/2021) 30 tablet 2     diclofenac (VOLTAREN) 1  % topical gel Apply 2 g topically 2 times daily 350 g 1     diclofenac (VOLTAREN) 75 MG EC tablet Take 1 tablet (75 mg) by mouth 2 times daily 60 tablet 1     dicyclomine (BENTYL) 20 mg tablet [DICYCLOMINE (BENTYL) 20 MG TABLET] Take 1 tablet (20 mg total) by mouth 4 (four) times a day as needed. 100 tablet 5     lidocaine (XYLOCAINE) 5 % ointment [LIDOCAINE (XYLOCAINE) 5 % OINTMENT] Apply to affected area twice daily (Patient not taking: Reported on 8/20/2021) 120 g 4     lithium 300 mg tablet [LITHIUM 300 MG TABLET] Take 1 tablet (300 mg total) by mouth 4 (four) times a day. 120 tablet 1     OLANZapine (ZYPREXA) 5 MG tablet [OLANZAPINE (ZYPREXA) 5 MG TABLET] Take 1 tablet (5 mg total) by mouth at bedtime. (Patient not taking: Reported on 8/20/2021) 30 tablet 2     omeprazole (PRILOSEC) 20 MG capsule [OMEPRAZOLE (PRILOSEC) 20 MG CAPSULE] Take 1 capsule (20 mg total) by mouth daily before breakfast. 30 capsule 11     ondansetron (ZOFRAN) 4 MG tablet [ONDANSETRON (ZOFRAN) 4 MG TABLET] Take 1 tablet (4 mg total) by mouth every 8 (eight) hours as needed for nausea. (Patient not taking: Reported on 8/20/2021) 16 tablet 0     oxyCODONE-acetaminophen (PERCOCET/ENDOCET) 5-325 mg per tablet [OXYCODONE-ACETAMINOPHEN (PERCOCET/ENDOCET) 5-325 MG PER TABLET] Take 1 tablet by mouth every 6 (six) hours as needed for pain. (Patient not taking: Reported on 8/20/2021) 40 tablet 0     pregabalin (LYRICA) 75 MG capsule [PREGABALIN (LYRICA) 75 MG CAPSULE] Take 1 capsule (75 mg total) by mouth 3 (three) times a day. 90 capsule 2               122 68

## 2021-09-22 DIAGNOSIS — F31.9 BIPOLAR 1 DISORDER (H): ICD-10-CM

## 2021-09-22 RX ORDER — LITHIUM CARBONATE 300 MG
TABLET ORAL
Qty: 150 TABLET | Refills: 3 | Status: SHIPPED | OUTPATIENT
Start: 2021-09-22 | End: 2022-06-09

## 2021-09-23 ENCOUNTER — TELEPHONE (OUTPATIENT)
Dept: FAMILY MEDICINE | Facility: CLINIC | Age: 26
End: 2021-09-23

## 2021-09-23 NOTE — TELEPHONE ENCOUNTER
RN spoke to patient explained lithium levels are low (0.2), should be 0.3-1.2.   Provided details of dosing and timing changes, patient verbalizes understanding of changes and the reason for them.   Patient stated that she has been having 'episodes' recently and asked if this could explain why. RN affirmed and asked patient to provide more information. Patient reports 'freaking out'. Patient reports feeling safe and having no suicidal ideation.     Patient will make appointment to have levels checked again in 3 months and was encouraged to call with questions or needs.     Radha Nice RN on 9/23/2021 at 9:50 AM

## 2021-10-11 ENCOUNTER — HEALTH MAINTENANCE LETTER (OUTPATIENT)
Age: 26
End: 2021-10-11

## 2021-11-17 DIAGNOSIS — R10.84 ABDOMINAL PAIN, GENERALIZED: ICD-10-CM

## 2021-11-17 DIAGNOSIS — K58.8 OTHER IRRITABLE BOWEL SYNDROME: ICD-10-CM

## 2021-11-17 RX ORDER — DICYCLOMINE HCL 20 MG
20 TABLET ORAL 4 TIMES DAILY PRN
Qty: 100 TABLET | Refills: 5 | Status: SHIPPED | OUTPATIENT
Start: 2021-11-17 | End: 2021-12-30

## 2021-11-17 NOTE — TELEPHONE ENCOUNTER
Reason for Call:  Medication or medication refill:    Do you use a [facility]?  Name of the pharmacy and phone number for the current request:  [facility] on [address]    Name of the medication requested: dicyclomine (BENTYL) 20 mg tablet    Other request: Pt is out of Med, needs ASAP    Can we leave a detailed message on this number? YES    Phone number patient can be reached at: [phone]    Best Time: ANY    Call taken on 11/17/2021 at 4:44 PM by Reddy Chong

## 2021-11-23 RX ORDER — DICYCLOMINE HCL 20 MG
20 TABLET ORAL 4 TIMES DAILY PRN
Qty: 100 TABLET | Refills: 5 | Status: CANCELLED | OUTPATIENT
Start: 2021-11-23

## 2021-11-23 NOTE — TELEPHONE ENCOUNTER
CMT contacted the patient and left detailed message for patient to notify that med order was sent.

## 2021-11-23 NOTE — TELEPHONE ENCOUNTER
Patient calls back to check on status of refill request. Caller states she's been out of medication since last Wednesday, Nov 17th. Can assistant/RN follow-up with Dr. Donaldson?

## 2021-12-30 ENCOUNTER — OFFICE VISIT (OUTPATIENT)
Dept: FAMILY MEDICINE | Facility: CLINIC | Age: 26
End: 2021-12-30
Payer: COMMERCIAL

## 2021-12-30 VITALS
BODY MASS INDEX: 42.91 KG/M2 | HEIGHT: 64 IN | DIASTOLIC BLOOD PRESSURE: 68 MMHG | HEART RATE: 96 BPM | TEMPERATURE: 98.1 F | SYSTOLIC BLOOD PRESSURE: 104 MMHG | RESPIRATION RATE: 18 BRPM

## 2021-12-30 DIAGNOSIS — R10.84 ABDOMINAL PAIN, GENERALIZED: ICD-10-CM

## 2021-12-30 DIAGNOSIS — F39 MOOD DISORDER (H): ICD-10-CM

## 2021-12-30 DIAGNOSIS — K63.89 EPIPLOIC APPENDAGITIS: Primary | ICD-10-CM

## 2021-12-30 DIAGNOSIS — K58.2 IRRITABLE BOWEL SYNDROME WITH BOTH CONSTIPATION AND DIARRHEA: ICD-10-CM

## 2021-12-30 DIAGNOSIS — F31.9 BIPOLAR 1 DISORDER (H): ICD-10-CM

## 2021-12-30 DIAGNOSIS — K58.8 OTHER IRRITABLE BOWEL SYNDROME: ICD-10-CM

## 2021-12-30 DIAGNOSIS — M62.89 PELVIC FLOOR DYSFUNCTION IN FEMALE: ICD-10-CM

## 2021-12-30 PROCEDURE — 99214 OFFICE O/P EST MOD 30 MIN: CPT | Performed by: FAMILY MEDICINE

## 2021-12-30 RX ORDER — ONDANSETRON 4 MG/1
4 TABLET, FILM COATED ORAL EVERY 8 HOURS PRN
Qty: 30 TABLET | Refills: 3 | Status: SHIPPED | OUTPATIENT
Start: 2021-12-30 | End: 2023-06-06

## 2021-12-30 RX ORDER — DICYCLOMINE HCL 20 MG
20 TABLET ORAL EVERY 6 HOURS
Qty: 100 TABLET | Refills: 4 | Status: SHIPPED | OUTPATIENT
Start: 2021-12-30 | End: 2022-03-28

## 2021-12-30 RX ORDER — CITALOPRAM HYDROBROMIDE 20 MG/1
20 TABLET ORAL DAILY
Qty: 30 TABLET | Refills: 2 | Status: SHIPPED | OUTPATIENT
Start: 2021-12-30 | End: 2022-06-09

## 2021-12-30 RX ORDER — PREGABALIN 75 MG/1
75 CAPSULE ORAL 3 TIMES DAILY
Qty: 90 CAPSULE | Refills: 2 | Status: SHIPPED | OUTPATIENT
Start: 2021-12-30 | End: 2022-08-11

## 2021-12-30 ASSESSMENT — PATIENT HEALTH QUESTIONNAIRE - PHQ9: SUM OF ALL RESPONSES TO PHQ QUESTIONS 1-9: 11

## 2021-12-30 NOTE — PROGRESS NOTES
ASSESSMENT and plan   1. Other irritable bowel syndrome    Patient has not been able to  her Bentyl and given a handheld prescription she understands it is hard to take this medication no side effect to be noted in the past.  - dicyclomine (BENTYL) 20 MG tablet; Take 1 tablet (20 mg) by mouth every 6 hours  Dispense: 100 tablet; Refill: 4    2. Abdominal pain, generalized    Abdominal pain is to be increasing because she has not had access to her Bentyl.  - dicyclomine (BENTYL) 20 MG tablet; Take 1 tablet (20 mg) by mouth every 6 hours  Dispense: 100 tablet; Refill: 4    3. Bipolar 1 disorder (H)    We discussed her bipolar disorder in detail and I refilled her Lyrica.  I will see her again in 3 weeks.  I will check a lithium level at that time.  - pregabalin (LYRICA) 75 MG capsule; Take 1 capsule (75 mg) by mouth 3 times daily  Dispense: 90 capsule; Refill: 2    4. Pelvic floor dysfunction in female      - pregabalin (LYRICA) 75 MG capsule; Take 1 capsule (75 mg) by mouth 3 times daily  Dispense: 90 capsule; Refill: 2    5. Mood disorder (H)    He has been more irritable at home and however she has been working overtime this is caused her to be more fatigued I would like her to restart her Celexa again she is still grieving over the loss of her grandmother who passed away 2 months ago  - citalopram (CELEXA) 20 MG tablet; Take 1 tablet (20 mg) by mouth daily  Dispense: 30 tablet; Refill: 2    6. Irritable bowel syndrome with both constipation and diarrhea    The Celexa does help with her IBS.  I would like her to restart the medication  - citalopram (CELEXA) 20 MG tablet; Take 1 tablet (20 mg) by mouth daily  Dispense: 30 tablet; Refill: 2    7. Epiploic appendagitis    Abdominal pain has been accentuated which is causing her to be nauseated I refilled her Zofran  - ondansetron (ZOFRAN) 4 MG tablet; Take 1 tablet (4 mg) by mouth every 8 hours as needed for nausea  Dispense: 30 tablet; Refill: 3    There are  no Patient Instructions on file for this visit.    No orders of the defined types were placed in this encounter.    Medications Discontinued During This Encounter   Medication Reason     dicyclomine (BENTYL) 20 MG tablet Reorder     citalopram (CELEXA) 20 MG tablet Reorder     pregabalin (LYRICA) 75 MG capsule Reorder       Return in about 3 weeks (around 1/20/2022) for depression, neuralgia.    CHIEF COMPLAINT:  chief complaint    HISTORY OF PRESENT ILLNESS:  Juan R is a 26 year old female who is here for a follow-up I have not seen her for several months.  She has been taking her medication sporadically and is restarted taking only regularly over the last 30 days.  She lost her grandmother due to COVID-19 and for the 1 month did not take any medications and did not go to work she reports that she is now throwing herself back into work and working 7 days a week however she is highly irritable and states that her mood is very expansive especially when she goes home her  reports that she can get very combative and has not been sleeping well.  She also notes that she is nauseated and her abdominal pain is increased since she has not been having access to her Bentyl bowel movements are still regular but she is worried that the diarrhea and constipation will start again.  States that she has been taking her Lyrica which does help with the pain but she is almost out of that medication.  Denies being depressed denies having any crying spells    REVIEW OF SYSTEMS:     GI positive for abdominal discomfort positive for abdominal bloating and cramping positive for nausea  Psych positive for expansive mood positive for mood swings positive for decreased sleep positive for increased irritation and agitation  10 point review of  All other systems are negative.    PFSH:    Social history reviewed    TOBACCO USE:  History   Smoking Status     Never Smoker   Smokeless Tobacco     Never Used       VITALS:  Vitals:    12/30/21  "1715   BP: 104/68   Pulse: 96   Resp: 18   Temp: 98.1  F (36.7  C)   TempSrc: Oral   Height: 1.626 m (5' 4\")     Wt Readings from Last 3 Encounters:   09/19/21 113.4 kg (250 lb)   08/05/21 113.4 kg (250 lb)   05/28/21 122.1 kg (269 lb 2 oz)       PHYSICAL EXAM:    Interactive female sitting quietly in the exam room no acute distress  HEENT neck supple mucous members moist oral cavity shows no exudate no erythema  Respiratory system clear to auscultation equal breath sounds no wheeze no crackles  CVS regular rate rhythm no murmurs rubs gallops appreciated  Abdomen soft there is no focal tenderness no hepatosplenomegaly  Psych oriented x3 not agitated well-groomed.  Affect is normal.  Maintains eye contact.    DATA REVIEWED:  Additional History from Old Records Summarized (2): 0  Decision to Obtain Records (1): 0  Radiology Tests Summarized or Ordered (1): 0  Labs Reviewed or Ordered (1): 0  Medicine Test Summarized or Ordered (1): 0  Independent Review of EKG or X-RAY(2 each): 0    The visit lasted a total of 30 minutes .    MEDICATIONS:  Current Outpatient Medications   Medication Sig Dispense Refill     citalopram (CELEXA) 20 MG tablet Take 1 tablet (20 mg) by mouth daily 30 tablet 2     dicyclomine (BENTYL) 20 MG tablet Take 1 tablet (20 mg) by mouth every 6 hours 100 tablet 4     ondansetron (ZOFRAN) 4 MG tablet Take 1 tablet (4 mg) by mouth every 8 hours as needed for nausea 30 tablet 3     pregabalin (LYRICA) 75 MG capsule Take 1 capsule (75 mg) by mouth 3 times daily 90 capsule 2     diclofenac (VOLTAREN) 1 % topical gel Apply 2 g topically 2 times daily 350 g 1     diclofenac (VOLTAREN) 75 MG EC tablet Take 1 tablet (75 mg) by mouth 2 times daily 60 tablet 1     lidocaine (XYLOCAINE) 5 % ointment [LIDOCAINE (XYLOCAINE) 5 % OINTMENT] Apply to affected area twice daily (Patient not taking: Reported on 8/20/2021) 120 g 4     lithium (ESKALITH) 300 MG tablet Take 600 mg in the morning , 300 mg in the afternoon  " And 600 mg at nighttime 150 tablet 3     OLANZapine (ZYPREXA) 5 MG tablet [OLANZAPINE (ZYPREXA) 5 MG TABLET] Take 1 tablet (5 mg total) by mouth at bedtime. (Patient not taking: Reported on 8/20/2021) 30 tablet 2     omeprazole (PRILOSEC) 20 MG capsule [OMEPRAZOLE (PRILOSEC) 20 MG CAPSULE] Take 1 capsule (20 mg total) by mouth daily before breakfast. 30 capsule 11     ondansetron (ZOFRAN) 4 MG tablet [ONDANSETRON (ZOFRAN) 4 MG TABLET] Take 1 tablet (4 mg total) by mouth every 8 (eight) hours as needed for nausea. (Patient not taking: Reported on 8/20/2021) 16 tablet 0     oxyCODONE-acetaminophen (PERCOCET/ENDOCET) 5-325 mg per tablet [OXYCODONE-ACETAMINOPHEN (PERCOCET/ENDOCET) 5-325 MG PER TABLET] Take 1 tablet by mouth every 6 (six) hours as needed for pain. (Patient not taking: Reported on 8/20/2021) 40 tablet 0

## 2022-01-20 ENCOUNTER — OFFICE VISIT (OUTPATIENT)
Dept: FAMILY MEDICINE | Facility: CLINIC | Age: 27
End: 2022-01-20
Payer: COMMERCIAL

## 2022-01-20 VITALS
TEMPERATURE: 97.5 F | DIASTOLIC BLOOD PRESSURE: 70 MMHG | HEIGHT: 64 IN | RESPIRATION RATE: 16 BRPM | HEART RATE: 80 BPM | BODY MASS INDEX: 42.91 KG/M2 | SYSTOLIC BLOOD PRESSURE: 110 MMHG | OXYGEN SATURATION: 99 %

## 2022-01-20 DIAGNOSIS — M62.830 SPASM OF MUSCLE OF LOWER BACK: ICD-10-CM

## 2022-01-20 DIAGNOSIS — K63.89 EPIPLOIC APPENDAGITIS: ICD-10-CM

## 2022-01-20 DIAGNOSIS — L30.9 DERMATITIS: ICD-10-CM

## 2022-01-20 DIAGNOSIS — F31.9 BIPOLAR 1 DISORDER (H): ICD-10-CM

## 2022-01-20 DIAGNOSIS — F43.10 PTSD (POST-TRAUMATIC STRESS DISORDER): Primary | ICD-10-CM

## 2022-01-20 LAB — LITHIUM SERPL-SCNC: 0.5 MMOL/L

## 2022-01-20 PROCEDURE — 80178 ASSAY OF LITHIUM: CPT | Performed by: FAMILY MEDICINE

## 2022-01-20 PROCEDURE — 99214 OFFICE O/P EST MOD 30 MIN: CPT | Performed by: FAMILY MEDICINE

## 2022-01-20 PROCEDURE — 36415 COLL VENOUS BLD VENIPUNCTURE: CPT | Performed by: FAMILY MEDICINE

## 2022-01-20 RX ORDER — METHOCARBAMOL 500 MG/1
500 TABLET, FILM COATED ORAL AT BEDTIME
Qty: 30 TABLET | Refills: 0 | Status: SHIPPED | OUTPATIENT
Start: 2022-01-20 | End: 2022-06-16

## 2022-01-20 RX ORDER — TRIAMCINOLONE ACETONIDE 0.25 MG/G
OINTMENT TOPICAL 2 TIMES DAILY
Qty: 80 G | Refills: 1 | Status: SHIPPED | OUTPATIENT
Start: 2022-01-20 | End: 2023-06-06

## 2022-01-30 ENCOUNTER — HEALTH MAINTENANCE LETTER (OUTPATIENT)
Age: 27
End: 2022-01-30

## 2022-03-28 DIAGNOSIS — K58.8 OTHER IRRITABLE BOWEL SYNDROME: ICD-10-CM

## 2022-03-28 DIAGNOSIS — R10.84 ABDOMINAL PAIN, GENERALIZED: ICD-10-CM

## 2022-03-28 DIAGNOSIS — Z76.0 ENCOUNTER FOR MEDICATION REFILL: Primary | ICD-10-CM

## 2022-03-28 RX ORDER — DICYCLOMINE HCL 20 MG
20 TABLET ORAL EVERY 6 HOURS
Qty: 100 TABLET | Refills: 4 | Status: SHIPPED | OUTPATIENT
Start: 2022-03-28 | End: 2022-06-09

## 2022-03-28 NOTE — TELEPHONE ENCOUNTER
Reason for Call:  Medication refill    Do you use a [facility]?  No    Name of the pharmacy and phone number for the current request: [id number] - [place], [place] - [facility] [address] AT [address]  [phone]    Name of the medication requested: dicyclomine (BENTYL) 20 MG tablet    Other request: Pt expresses that she has been out of her medication since last Thursday and her pharmacy kept telling her she doesn't have anymore refills and to contact the clinic. Pt also expresses that she has issue with her pharmacy every time she calls to get a refill. Med was last prescribed on 12/30/21 with 4 additional refills. Can care team call pharmacy to give verbal so pt can get her meds or can PCP send in a new electronic prescription? Please review. Thank you.    Can we leave a detailed message on this number? YES    Phone number patient can be reached at: Cell number on file:    Telephone Information:   Mobile [phone]       Best Time: anytime    Call taken on 3/28/2022 at 2:03 PM by Bonifacio Hubbard

## 2022-04-02 ENCOUNTER — HOSPITAL ENCOUNTER (EMERGENCY)
Facility: HOSPITAL | Age: 27
Discharge: HOME | End: 2022-04-02
Attending: EMERGENCY MEDICINE | Admitting: EMERGENCY MEDICINE
Payer: COMMERCIAL

## 2022-04-02 ENCOUNTER — APPOINTMENT (OUTPATIENT)
Dept: CT IMAGING | Facility: HOSPITAL | Age: 27
End: 2022-04-02
Attending: EMERGENCY MEDICINE
Payer: COMMERCIAL

## 2022-04-02 ENCOUNTER — APPOINTMENT (OUTPATIENT)
Dept: RADIOLOGY | Facility: HOSPITAL | Age: 27
End: 2022-04-02
Attending: EMERGENCY MEDICINE
Payer: COMMERCIAL

## 2022-04-02 VITALS — DIASTOLIC BLOOD PRESSURE: 73 MMHG | HEART RATE: 95 BPM | OXYGEN SATURATION: 99 % | SYSTOLIC BLOOD PRESSURE: 117 MMHG

## 2022-04-02 DIAGNOSIS — S93.492A SPRAIN OF ANTERIOR TALOFIBULAR LIGAMENT OF LEFT ANKLE, INITIAL ENCOUNTER: ICD-10-CM

## 2022-04-02 PROCEDURE — 99285 EMERGENCY DEPT VISIT HI MDM: CPT | Mod: 25

## 2022-04-02 PROCEDURE — 73610 X-RAY EXAM OF ANKLE: CPT | Mod: LT

## 2022-04-02 PROCEDURE — 73560 X-RAY EXAM OF KNEE 1 OR 2: CPT | Mod: RT

## 2022-04-02 PROCEDURE — 250N000013 HC RX MED GY IP 250 OP 250 PS 637: Performed by: EMERGENCY MEDICINE

## 2022-04-02 PROCEDURE — 70450 CT HEAD/BRAIN W/O DYE: CPT

## 2022-04-02 RX ORDER — IBUPROFEN 600 MG/1
600 TABLET, FILM COATED ORAL ONCE
Status: COMPLETED | OUTPATIENT
Start: 2022-04-02 | End: 2022-04-02

## 2022-04-02 RX ADMIN — IBUPROFEN 600 MG: 600 TABLET ORAL at 15:37

## 2022-04-02 ASSESSMENT — ENCOUNTER SYMPTOMS
LIGHT-HEADEDNESS: 1
ARTHRALGIAS: 1
JOINT SWELLING: 1
NAUSEA: 1
COLOR CHANGE: 1
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
## 2022-04-05 ENCOUNTER — OFFICE VISIT (OUTPATIENT)
Dept: FAMILY MEDICINE | Facility: CLINIC | Age: 27
End: 2022-04-05
Payer: COMMERCIAL

## 2022-04-05 ENCOUNTER — NURSE TRIAGE (OUTPATIENT)
Dept: NURSING | Facility: CLINIC | Age: 27
End: 2022-04-05
Payer: COMMERCIAL

## 2022-04-05 VITALS
OXYGEN SATURATION: 99 % | HEART RATE: 94 BPM | BODY MASS INDEX: 42.91 KG/M2 | RESPIRATION RATE: 16 BRPM | DIASTOLIC BLOOD PRESSURE: 76 MMHG | TEMPERATURE: 97.8 F | WEIGHT: 250 LBS | SYSTOLIC BLOOD PRESSURE: 126 MMHG

## 2022-04-05 DIAGNOSIS — S93.492D SPRAIN OF ANTERIOR TALOFIBULAR LIGAMENT OF LEFT ANKLE, SUBSEQUENT ENCOUNTER: Primary | ICD-10-CM

## 2022-04-05 PROCEDURE — 99213 OFFICE O/P EST LOW 20 MIN: CPT | Performed by: PHYSICIAN ASSISTANT

## 2022-04-05 ASSESSMENT — ENCOUNTER SYMPTOMS
JOINT SWELLING: 1
WOUND: 0
ARTHRALGIAS: 1

## 2022-04-05 NOTE — PATIENT INSTRUCTIONS
1) Use crutches to avoid weight bearing on the ankle while it hurts. Advance weight bearing as tolerated.   2) Wear ankle brace while up and about.  3) Ice on and off for 15 min at a time at least 3 times per day.   4) When you are at home elevate the ankle on a chair or with pillows when you lounge. Try to make it so your ankle is higher than the level of your heart.   5) Take ibuprofen 600 mg three times daily with meals for pain control. May additionally take Tylenol over the counter   6) Follow up with your primary care provider if no improvement in 10 days.     Treating Ankle Sprains  Treatment will depend on how bad your sprain is. For a severe sprain, healing may take 3 months or more.    Right After Your Injury: Use R.I.C.E.  Rest: At first, keep weight off the ankle as much as you can. You may be given crutches to help you walk without putting weight on the ankle.  Ice: Put an ice pack on the ankle for 15 minutes. Remove the pack and wait at least 30 minutes. Repeat for up to 3 days. This helps reduce swelling.  Compression: To reduce swelling and keep the joint stable, you may need to wrap the ankle with an elastic bandage. For more severe sprains, you may need an ankle brace or a cast.  Elevation: To reduce swelling, keep your ankle raised above your heart when you sit or lie down.  Medication  Your doctor may suggest an oral anti-inflammatory medication, such as ibuprofen. This relieves the pain and helps reduce any swelling. Be sure to take your medication as directed.  Contrast Baths  After 3 days, soak your ankle in warm water for 30 seconds, then in cool water for 30 seconds. Go back and forth for 5 minutes. Doing this every 2 hours will help keep the swelling down.    Exercises  After about 2-3 weeks, you may be given exercises to strengthen the ligaments and muscles in the ankle. Doing these exercises will help prevent another ankle sprain. Exercises may include standing on your toes and then on  your heels and doing ankle curls.  Ankle Curls    Sit on the edge of a sturdy table or lie on your back.    Pull your toes toward you. Then point them away from you. Repeat for 2-3 minutes.     [id number] [employer]. [address] [place] [address], [place] [address]. All rights reserved. This information is not intended as a substitute for professional medical care. Always follow your healthcare professional's instructions.
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
## 2022-04-07 ENCOUNTER — TELEPHONE (OUTPATIENT)
Dept: FAMILY MEDICINE | Facility: CLINIC | Age: 27
End: 2022-04-07

## 2022-04-07 ENCOUNTER — VIRTUAL VISIT (OUTPATIENT)
Dept: FAMILY MEDICINE | Facility: CLINIC | Age: 27
End: 2022-04-07
Payer: COMMERCIAL

## 2022-04-07 DIAGNOSIS — F43.10 PTSD (POST-TRAUMATIC STRESS DISORDER): ICD-10-CM

## 2022-04-07 DIAGNOSIS — Z79.899 ENCOUNTER FOR LONG-TERM (CURRENT) USE OF MEDICATIONS: ICD-10-CM

## 2022-04-07 DIAGNOSIS — F31.9 BIPOLAR 1 DISORDER (H): ICD-10-CM

## 2022-04-07 DIAGNOSIS — S93.492D SPRAIN OF ANTERIOR TALOFIBULAR LIGAMENT OF LEFT ANKLE, SUBSEQUENT ENCOUNTER: Primary | ICD-10-CM

## 2022-04-07 DIAGNOSIS — F41.0 ANXIETY ATTACK: ICD-10-CM

## 2022-04-07 PROCEDURE — 99214 OFFICE O/P EST MOD 30 MIN: CPT | Mod: 95 | Performed by: FAMILY MEDICINE

## 2022-04-07 RX ORDER — CLONAZEPAM 0.5 MG/1
0.5 TABLET ORAL 2 TIMES DAILY
Qty: 60 TABLET | Refills: 0 | Status: SHIPPED | OUTPATIENT
Start: 2022-04-07 | End: 2022-05-31

## 2022-04-07 RX ORDER — CELECOXIB 100 MG/1
100 CAPSULE ORAL 2 TIMES DAILY
Qty: 60 CAPSULE | Refills: 1 | Status: SHIPPED | OUTPATIENT
Start: 2022-04-07 | End: 2023-03-21

## 2022-04-07 NOTE — TELEPHONE ENCOUNTER
Author spoke to Dr. Donaldson who advised that patient can do a virtual visit with him today at 04:40 pm. AK advised that patient needs to be seen virtually in order to get prescribed medications or medications that patient is reuqesting.    Left voicemail for patient to call clinic back. If patient calls back, please relay message above about needing a visit with provider in order to get medications.    Aleena VALE RN

## 2022-04-07 NOTE — TELEPHONE ENCOUNTER
Patient called to update on her L Foot. Patient was provided ibuprofen 600 mg three times daily with meals for pain control and Tylenol over the counter. Patient stated these are not helping the pain. Patient stated she has thrown up from the pain and its starting to get unbearable.    Patient looking for answers for both statements below...    Patient is requesting a medication that is stronger than 600 mg or tylenol.    Patient also looking to get her lithium reordered but patient stated her insurance is not allowing for it to be reordered. Patient is concerned because its her Bi-Polar medication.    Writer gave patient her insurance information to call and see what is stopping this. Patient looking for any other information about the reordering of this medication.    Patient stated urgent for the pain, patient declined being sent to nurse triage.    Call back   [phone]
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
## 2022-04-07 NOTE — TELEPHONE ENCOUNTER
Patient returns call. Writer relay RN/provider's message below. Patient verbalizes understanding and has no further questions. Patient agreed to schedule virtual visit today at 4:40 P.M. with Dr. Donaldson.

## 2022-04-14 NOTE — TELEPHONE ENCOUNTER
Pt called stating she was told to call clinic back if pain on her lft ankle isn't better. Pt states that it's not better and wondering if PCP can send in a stronger rx for pain and rx for her anxiety. Pt declined f/u appt at this time and just wants medication. Please review and advise.

## 2022-04-14 NOTE — TELEPHONE ENCOUNTER
Please inform patient that if her symptoms are not improved and we need to change her medication possible therapies I will need a virtual visit at a minimal we can try to schedule a phone or video visit in the future

## 2022-04-18 NOTE — TELEPHONE ENCOUNTER
Contacted patient and she tested positive for Covid yesterday.  Patient has a virtual appointment scheduled for tomorrow, 4/19/2022 with Dr Donaldson.    Discussed Covid symptoms with patient an she will call clinic back if worse.    No further action needed.    Claudia Borden RN  [facility]        Please inform patient that if her symptoms are not improved and we need to change her medication possible therapies I will need a virtual visit at a minimal we can try to schedule a phone or video visit in the future

## 2022-04-19 ENCOUNTER — VIRTUAL VISIT (OUTPATIENT)
Dept: FAMILY MEDICINE | Facility: CLINIC | Age: 27
End: 2022-04-19

## 2022-04-19 DIAGNOSIS — U07.1 INFECTION DUE TO 2019 NOVEL CORONAVIRUS: Primary | ICD-10-CM

## 2022-04-19 DIAGNOSIS — R05.9 COUGH: ICD-10-CM

## 2022-04-19 PROCEDURE — 99214 OFFICE O/P EST MOD 30 MIN: CPT | Mod: 95 | Performed by: FAMILY MEDICINE

## 2022-04-19 RX ORDER — ALBUTEROL SULFATE 90 UG/1
2 AEROSOL, METERED RESPIRATORY (INHALATION) EVERY 6 HOURS
Qty: 18 G | Refills: 1 | Status: SHIPPED | OUTPATIENT
Start: 2022-04-19 | End: 2022-12-07

## 2022-04-19 RX ORDER — BENZONATATE 200 MG/1
200 CAPSULE ORAL 3 TIMES DAILY PRN
Qty: 30 CAPSULE | Refills: 0 | Status: SHIPPED | OUTPATIENT
Start: 2022-04-19 | End: 2023-06-06

## 2022-04-19 NOTE — PROGRESS NOTES
Juan R is a 27 year old who is being evaluated via a billable telephone visit.      What phone number would you like to be contacted at? [phone]  How would you like to obtain your AVS? MyChart    Assessment & Plan     Infection due to 2019 novel coronavirus  Patient began noticing symptoms on Saturday which consisted of body ache and nasal congestion developed fever and chills had a positive home test on Sunday is resting at home taken Tylenol for body ache he has a cough headaches.  She will continue the Tylenol for body ache she will obtain a pulse oximeter today and we will call her back because occasionally she feels short of breath no wheezing's been noted    Cough  Albuterol, and Tessalon Perles given for cough her cough is nonproductive      No follow-ups on file.    Dixon Donaldson MD  [facility]    Arianna Pete is a 27 year old who presents for the following health issues  Possible fever body ache headache fatigue secondary to COVID-19 infection diagnosed with a home test approximately 48 hours ago.    HPI   Patient is a 27-year-old female whose been having headaches body ache cough for approximately 3 days.  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  No infectious contacts noted she sent her son to stay with her grandmother.  She reports that she has been able to take her medications faithfully however she notes that the cough is present and is worse at night she also says she has body ache with that Tylenol sometimes helps with.  She feels that she is short of breath but notes no wheezing or stomach pain.  Appetite is diminished.  {  Review of Systems   Generalized positive for fatigue positive for fever positive for chills musculoskeletal positive for body  Pulmonary positive for cough 10 point review of other systems negative       Objective           Vitals:  No vitals were obtained today due to virtual visit.    Physical Exam   healthy, alert and no distress  PSYCH: Alert and oriented times 3; coherent speech, normal   rate and volume, able to articulate logical thoughts, able   to abstract reason, no tangential thoughts, no hallucinations   or delusions  Her affect is normal  RESP: No cough, no audible wheezing, able to talk in full sentences  Remainder of exam unable to be completed due to telephone visits        Phone call duration: 30 minutes

## 2022-05-31 DIAGNOSIS — F41.0 ANXIETY ATTACK: ICD-10-CM

## 2022-05-31 DIAGNOSIS — Z76.0 ENCOUNTER FOR MEDICATION REFILL: Primary | ICD-10-CM

## 2022-05-31 RX ORDER — CLONAZEPAM 0.5 MG/1
0.5 TABLET ORAL 2 TIMES DAILY
Qty: 60 TABLET | Refills: 0 | Status: SHIPPED | OUTPATIENT
Start: 2022-05-31 | End: 2022-06-09

## 2022-05-31 NOTE — TELEPHONE ENCOUNTER
Reason for Call:  Medication or medication refill:    Do you use a [facility]?  Name of the pharmacy and phone number for the current request:  [facility] on [address]    Name of the medication requested: Clonazepam     Other request:     Can we leave a detailed message on this number? YES    Phone number patient can be reached at: Home number on file [phone] (home)    Best Time: ANY    Call taken on 5/31/2022 at 12:03 PM by Reddy Chong

## 2022-06-09 ENCOUNTER — VIRTUAL VISIT (OUTPATIENT)
Dept: FAMILY MEDICINE | Facility: CLINIC | Age: 27
End: 2022-06-09

## 2022-06-09 DIAGNOSIS — R10.84 ABDOMINAL PAIN, GENERALIZED: ICD-10-CM

## 2022-06-09 DIAGNOSIS — S19.9XXA HEADACHE DUE TO INJURY OF HEAD AND NECK: ICD-10-CM

## 2022-06-09 DIAGNOSIS — K58.8 OTHER IRRITABLE BOWEL SYNDROME: ICD-10-CM

## 2022-06-09 DIAGNOSIS — Z76.0 ENCOUNTER FOR MEDICATION REFILL: ICD-10-CM

## 2022-06-09 DIAGNOSIS — Z79.899 ENCOUNTER FOR LONG-TERM (CURRENT) USE OF MEDICATIONS: ICD-10-CM

## 2022-06-09 DIAGNOSIS — F31.9 BIPOLAR 1 DISORDER (H): ICD-10-CM

## 2022-06-09 DIAGNOSIS — F39 MOOD DISORDER (H): ICD-10-CM

## 2022-06-09 DIAGNOSIS — K58.2 IRRITABLE BOWEL SYNDROME WITH BOTH CONSTIPATION AND DIARRHEA: ICD-10-CM

## 2022-06-09 DIAGNOSIS — F41.0 ANXIETY ATTACK: ICD-10-CM

## 2022-06-09 DIAGNOSIS — S09.90XA HEADACHE DUE TO INJURY OF HEAD AND NECK: ICD-10-CM

## 2022-06-09 DIAGNOSIS — M79.662 PAIN OF LEFT LOWER LEG: Primary | ICD-10-CM

## 2022-06-09 PROCEDURE — 99215 OFFICE O/P EST HI 40 MIN: CPT | Mod: 95 | Performed by: FAMILY MEDICINE

## 2022-06-09 RX ORDER — CLONAZEPAM 0.5 MG/1
0.5 TABLET ORAL 2 TIMES DAILY
Qty: 60 TABLET | Refills: 0 | Status: SHIPPED | OUTPATIENT
Start: 2022-06-09 | End: 2022-06-16

## 2022-06-09 RX ORDER — LITHIUM CARBONATE 300 MG
TABLET ORAL
Qty: 150 TABLET | Refills: 4 | Status: SHIPPED | OUTPATIENT
Start: 2022-06-09 | End: 2022-08-11

## 2022-06-09 RX ORDER — DICYCLOMINE HCL 20 MG
20 TABLET ORAL EVERY 6 HOURS
Qty: 100 TABLET | Refills: 11 | Status: SHIPPED | OUTPATIENT
Start: 2022-06-09 | End: 2022-08-11

## 2022-06-09 RX ORDER — CITALOPRAM HYDROBROMIDE 20 MG/1
20 TABLET ORAL DAILY
Qty: 30 TABLET | Refills: 11 | Status: SHIPPED | OUTPATIENT
Start: 2022-06-09 | End: 2022-08-11

## 2022-06-16 ENCOUNTER — ANCILLARY PROCEDURE (OUTPATIENT)
Dept: GENERAL RADIOLOGY | Facility: CLINIC | Age: 27
End: 2022-06-16
Attending: FAMILY MEDICINE
Payer: COMMERCIAL

## 2022-06-16 ENCOUNTER — OFFICE VISIT (OUTPATIENT)
Dept: FAMILY MEDICINE | Facility: CLINIC | Age: 27
End: 2022-06-16

## 2022-06-16 VITALS
WEIGHT: 250 LBS | DIASTOLIC BLOOD PRESSURE: 70 MMHG | BODY MASS INDEX: 42.68 KG/M2 | SYSTOLIC BLOOD PRESSURE: 100 MMHG | HEART RATE: 98 BPM | OXYGEN SATURATION: 100 % | HEIGHT: 64 IN | TEMPERATURE: 97.9 F

## 2022-06-16 DIAGNOSIS — F41.1 GENERALIZED ANXIETY DISORDER: ICD-10-CM

## 2022-06-16 DIAGNOSIS — S00.83XA CONTUSION OF FACE, SCALP AND NECK, INITIAL ENCOUNTER: ICD-10-CM

## 2022-06-16 DIAGNOSIS — F43.10 PTSD (POST-TRAUMATIC STRESS DISORDER): Primary | ICD-10-CM

## 2022-06-16 DIAGNOSIS — M79.662 PAIN OF LEFT LOWER LEG: ICD-10-CM

## 2022-06-16 DIAGNOSIS — Z79.899 ENCOUNTER FOR LONG-TERM (CURRENT) USE OF MEDICATIONS: ICD-10-CM

## 2022-06-16 DIAGNOSIS — G44.209 TENSION HEADACHE: ICD-10-CM

## 2022-06-16 DIAGNOSIS — S00.03XA CONTUSION OF FACE, SCALP AND NECK, INITIAL ENCOUNTER: ICD-10-CM

## 2022-06-16 DIAGNOSIS — Z76.0 ENCOUNTER FOR MEDICATION REFILL: ICD-10-CM

## 2022-06-16 DIAGNOSIS — F41.0 ANXIETY ATTACK: ICD-10-CM

## 2022-06-16 DIAGNOSIS — Z11.59 NEED FOR HEPATITIS C SCREENING TEST: ICD-10-CM

## 2022-06-16 DIAGNOSIS — S10.93XA CONTUSION OF FACE, SCALP AND NECK, INITIAL ENCOUNTER: ICD-10-CM

## 2022-06-16 DIAGNOSIS — F31.9 BIPOLAR 1 DISORDER (H): ICD-10-CM

## 2022-06-16 DIAGNOSIS — Z11.4 SCREENING FOR HIV (HUMAN IMMUNODEFICIENCY VIRUS): ICD-10-CM

## 2022-06-16 DIAGNOSIS — K63.89 EPIPLOIC APPENDAGITIS: ICD-10-CM

## 2022-06-16 DIAGNOSIS — K58.2 IRRITABLE BOWEL SYNDROME WITH BOTH CONSTIPATION AND DIARRHEA: ICD-10-CM

## 2022-06-16 PROCEDURE — 99214 OFFICE O/P EST MOD 30 MIN: CPT | Performed by: FAMILY MEDICINE

## 2022-06-16 PROCEDURE — 73610 X-RAY EXAM OF ANKLE: CPT | Mod: TC | Performed by: RADIOLOGY

## 2022-06-16 RX ORDER — CELECOXIB 200 MG/1
200 CAPSULE ORAL DAILY
Qty: 30 CAPSULE | Refills: 1 | Status: SHIPPED | OUTPATIENT
Start: 2022-06-16 | End: 2023-06-06

## 2022-06-16 RX ORDER — CLONAZEPAM 1 MG/1
1 TABLET ORAL 2 TIMES DAILY
Qty: 60 TABLET | Refills: 0 | Status: SHIPPED | OUTPATIENT
Start: 2022-06-16 | End: 2022-08-11

## 2022-06-16 ASSESSMENT — ANXIETY QUESTIONNAIRES
1. FEELING NERVOUS, ANXIOUS, OR ON EDGE: NEARLY EVERY DAY
1. FEELING NERVOUS, ANXIOUS, OR ON EDGE: NEARLY EVERY DAY
5. BEING SO RESTLESS THAT IT IS HARD TO SIT STILL: MORE THAN HALF THE DAYS
5. BEING SO RESTLESS THAT IT IS HARD TO SIT STILL: MORE THAN HALF THE DAYS
7. FEELING AFRAID AS IF SOMETHING AWFUL MIGHT HAPPEN: NEARLY EVERY DAY
2. NOT BEING ABLE TO STOP OR CONTROL WORRYING: NEARLY EVERY DAY
7. FEELING AFRAID AS IF SOMETHING AWFUL MIGHT HAPPEN: NEARLY EVERY DAY
GAD7 TOTAL SCORE: 19
GAD7 TOTAL SCORE: 19
3. WORRYING TOO MUCH ABOUT DIFFERENT THINGS: NEARLY EVERY DAY
GAD7 TOTAL SCORE: 19
3. WORRYING TOO MUCH ABOUT DIFFERENT THINGS: NEARLY EVERY DAY
6. BECOMING EASILY ANNOYED OR IRRITABLE: MORE THAN HALF THE DAYS
7. FEELING AFRAID AS IF SOMETHING AWFUL MIGHT HAPPEN: NEARLY EVERY DAY
2. NOT BEING ABLE TO STOP OR CONTROL WORRYING: NEARLY EVERY DAY
8. IF YOU CHECKED OFF ANY PROBLEMS, HOW DIFFICULT HAVE THESE MADE IT FOR YOU TO DO YOUR WORK, TAKE CARE OF THINGS AT HOME, OR GET ALONG WITH OTHER PEOPLE?: EXTREMELY DIFFICULT
GAD7 TOTAL SCORE: 19
6. BECOMING EASILY ANNOYED OR IRRITABLE: MORE THAN HALF THE DAYS
4. TROUBLE RELAXING: NEARLY EVERY DAY

## 2022-06-16 ASSESSMENT — PATIENT HEALTH QUESTIONNAIRE - PHQ9
SUM OF ALL RESPONSES TO PHQ QUESTIONS 1-9: 8
10. IF YOU CHECKED OFF ANY PROBLEMS, HOW DIFFICULT HAVE THESE PROBLEMS MADE IT FOR YOU TO DO YOUR WORK, TAKE CARE OF THINGS AT HOME, OR GET ALONG WITH OTHER PEOPLE: SOMEWHAT DIFFICULT
5. POOR APPETITE OR OVEREATING: NEARLY EVERY DAY
SUM OF ALL RESPONSES TO PHQ QUESTIONS 1-9: 8
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
## 2022-07-15 DIAGNOSIS — R11.2 NON-INTRACTABLE VOMITING WITH NAUSEA, UNSPECIFIED VOMITING TYPE: ICD-10-CM

## 2022-07-15 DIAGNOSIS — Z76.0 ENCOUNTER FOR MEDICATION REFILL: Primary | ICD-10-CM

## 2022-07-15 RX ORDER — ONDANSETRON 4 MG/1
4 TABLET, FILM COATED ORAL EVERY 8 HOURS PRN
Qty: 16 TABLET | Refills: 1 | Status: SHIPPED | OUTPATIENT
Start: 2022-07-15 | End: 2023-06-06

## 2022-08-11 ENCOUNTER — VIRTUAL VISIT (OUTPATIENT)
Dept: FAMILY MEDICINE | Facility: CLINIC | Age: 27
End: 2022-08-11
Payer: COMMERCIAL

## 2022-08-11 ENCOUNTER — TELEPHONE (OUTPATIENT)
Dept: FAMILY MEDICINE | Facility: CLINIC | Age: 27
End: 2022-08-11

## 2022-08-11 DIAGNOSIS — Z12.4 CERVICAL CANCER SCREENING: ICD-10-CM

## 2022-08-11 DIAGNOSIS — F31.9 BIPOLAR 1 DISORDER (H): ICD-10-CM

## 2022-08-11 DIAGNOSIS — G89.29 CHRONIC LEFT SHOULDER PAIN: ICD-10-CM

## 2022-08-11 DIAGNOSIS — R10.84 ABDOMINAL PAIN, GENERALIZED: ICD-10-CM

## 2022-08-11 DIAGNOSIS — M25.512 CHRONIC LEFT SHOULDER PAIN: ICD-10-CM

## 2022-08-11 DIAGNOSIS — Z76.0 ENCOUNTER FOR MEDICATION REFILL: ICD-10-CM

## 2022-08-11 DIAGNOSIS — K58.8 OTHER IRRITABLE BOWEL SYNDROME: ICD-10-CM

## 2022-08-11 DIAGNOSIS — E66.01 MORBID OBESITY (H): Primary | ICD-10-CM

## 2022-08-11 DIAGNOSIS — M54.50 LUMBAR BACK PAIN: ICD-10-CM

## 2022-08-11 DIAGNOSIS — F41.0 ANXIETY ATTACK: ICD-10-CM

## 2022-08-11 DIAGNOSIS — Z11.59 NEED FOR HEPATITIS C SCREENING TEST: ICD-10-CM

## 2022-08-11 DIAGNOSIS — K58.2 IRRITABLE BOWEL SYNDROME WITH BOTH CONSTIPATION AND DIARRHEA: ICD-10-CM

## 2022-08-11 DIAGNOSIS — M62.89 PELVIC FLOOR DYSFUNCTION IN FEMALE: ICD-10-CM

## 2022-08-11 DIAGNOSIS — F39 MOOD DISORDER (H): ICD-10-CM

## 2022-08-11 DIAGNOSIS — Z11.4 SCREENING FOR HIV (HUMAN IMMUNODEFICIENCY VIRUS): ICD-10-CM

## 2022-08-11 DIAGNOSIS — Z79.899 ENCOUNTER FOR LONG-TERM (CURRENT) USE OF MEDICATIONS: ICD-10-CM

## 2022-08-11 PROCEDURE — 99214 OFFICE O/P EST MOD 30 MIN: CPT | Mod: 95 | Performed by: FAMILY MEDICINE

## 2022-08-11 RX ORDER — CITALOPRAM HYDROBROMIDE 20 MG/1
20 TABLET ORAL DAILY
Qty: 30 TABLET | Refills: 11 | Status: SHIPPED | OUTPATIENT
Start: 2022-08-11 | End: 2022-08-24

## 2022-08-11 RX ORDER — PREGABALIN 75 MG/1
75 CAPSULE ORAL 3 TIMES DAILY
Qty: 90 CAPSULE | Refills: 2 | Status: SHIPPED | OUTPATIENT
Start: 2022-08-11 | End: 2022-12-07

## 2022-08-11 RX ORDER — CLONAZEPAM 1 MG/1
1 TABLET ORAL 2 TIMES DAILY
Qty: 60 TABLET | Refills: 0 | Status: SHIPPED | OUTPATIENT
Start: 2022-08-11 | End: 2022-08-24

## 2022-08-11 RX ORDER — LITHIUM CARBONATE 300 MG
TABLET ORAL
Qty: 150 TABLET | Refills: 11 | Status: SHIPPED | OUTPATIENT
Start: 2022-08-11 | End: 2022-08-19

## 2022-08-11 RX ORDER — DICYCLOMINE HCL 20 MG
20 TABLET ORAL EVERY 6 HOURS
Qty: 100 TABLET | Refills: 11 | Status: SHIPPED | OUTPATIENT
Start: 2022-08-11 | End: 2022-08-19

## 2022-08-11 NOTE — PROGRESS NOTES
Juan R is a 27 year old who is being evaluated via a billable telephone visit.      What phone number would you like to be contacted at? [phone]  How would you like to obtain your AVS? Abram    Assessment & Plan     Medical decision making    Bipolar disorder controlled on lithium she is been out of the medication for a week she needs to have this restarted, we will check a level when she is in clinic at the next visit    Irritable bowel syndrome a combination of Bentyl and Celexa needs to be continued.  She understands this.  She has not been eating any food that would cause her to have diarrhea or constipation    Morbid obesity patient is grossly overweight.  She does not want to go for surgery she does not want to do bariatric surgery if possible we talked about appetite suppressants currently she needs to start an exercise program and we can discuss something for appetite suppression in the future.  I did mention to her that many of her medications are potential side effects with these medicines she understands    Lumbar back pain patient is complaining of pain daily.  She says the Celebrex works some of the time.  She is on Lyrica for pelvic floor dysfunction I informed her that this medication also will work for chronic pain she has been out of this medication for a week that is been refilled she will follow-up with me in clinic within the next 3 weeks.    Anxiety disorder the Klonopin is helping I have continued the same dose she is not to take extra doses.  She notes no panic attacks.        Bipolar 1 disorder (H)  Patient has not been feeling well she has been out of her lithium for 1 week we discussed her last lithium level she needs to have her level rechecked she will have it done at her next visit.  I represcribed her medication.  I have also represcribed Lyrica.  - lithium (ESKALITH) 300 MG tablet; Take 600 mg in the morning , 300 mg in the afternoon  And 600 mg at nighttime  - pregabalin  (LYRICA) 75 MG capsule; Take 1 capsule (75 mg) by mouth 3 times daily    Other irritable bowel syndrome    She has had access to no dicyclomine for the last 7 days she is having abdominal cramping no diarrhea reported medication refilled  - dicyclomine (BENTYL) 20 MG tablet; Take 1 tablet (20 mg) by mouth every 6 hours    Abdominal pain, generalized    Because she has been having cramping her abdominal pain is increased  - dicyclomine (BENTYL) 20 MG tablet; Take 1 tablet (20 mg) by mouth every 6 hours    Encounter for medication refill    I refilled her Klonopin  - dicyclomine (BENTYL) 20 MG tablet; Take 1 tablet (20 mg) by mouth every 6 hours  - clonazePAM (KLONOPIN) 1 MG tablet; Take 1 tablet (1 mg) by mouth 2 times daily    Encounter for long-term (current) use of medications        Screening for HIV (human immunodeficiency virus)    She will have this test done at the next visit    Need for hepatitis C screening test    She needs to have this test done she will have it at her next in person visit    Cervical cancer screening    She needs to have a Pap smear done    Pelvic floor dysfunction in female    Lyrica helps with her pelvic floor pain I refilled the medication side effects discussed in detail  - pregabalin (LYRICA) 75 MG capsule; Take 1 capsule (75 mg) by mouth 3 times daily    Chronic left shoulder pain    I have refilled her Voltaren I would like her to use it at minimum 2 times a day for left shoulder pain  - diclofenac (VOLTAREN) 1 % topical gel; Apply 2 g topically 2 times daily    Mood disorder (H)    Refilled Celexa side effects discussed  - citalopram (CELEXA) 20 MG tablet; Take 1 tablet (20 mg) by mouth daily    Irritable bowel syndrome with both constipation and diarrhea      - citalopram (CELEXA) 20 MG tablet; Take 1 tablet (20 mg) by mouth daily    Anxiety attack    Anxiety is better she has been using the medication twice a day no panic attacks have been noted she has been sleeping well  -  "clonazePAM (KLONOPIN) 1 MG tablet; Take 1 tablet (1 mg) by mouth 2 times daily               Morbid obesity    She understands she is overweight she has been obese for the last 10 years she understands that this is having effect on general health she is noticing back pain knee pain and shoulder pain.  We discussed methods for controlling obesity like her to start walking and minimum of 20 minutes a day.  She may require referral to the bariatric department.    Lumbar back pain    She was told at her last OB visit that she would need breast reduction she does not want surgery if possible she does state that she has back pain even with sitting in a chair at work she is nontender lifting or bending at work.  She will try the Voltaren.  I have suggested ice pack I will send her to physical therapy after her next in person visit  BMI:   Estimated body mass index is 42.91 kg/m  as calculated from the following:    Height as of 6/16/22: 1.626 m (5' 4\").    Weight as of 6/16/22: 113.4 kg (250 lb).       Follow-up in 3 weeks    No follow-ups on file.    Dixon Donaldson MD  [facility] JOSE Pete is a 27 year old, presenting for the following health issues:  med check   Discussion and back pain  Discussion regarding bipolar disorder  Questions regarding anxiety      HPI   27-year-old following up on virtual phone call today.  She reports that she been out of several of her medications for more than a week and she feels this is impacting her health.  She is been out of her Lyrica she has been experiencing more back pain.  She has also been out of her dicyclomine is having stomach cramps.  She also has been out of her lithium and feels \"\" crappy\".  She denies having any outbursts of anger.  She says her sleep is still well.  She says the Klonopin really \"and \"helps\".  She states that she has been experiencing more back pain and she is worried that she may need to have a breast reduction done she " was told this at her last OB appointment.  She is afraid of surgery and would prefer nonsurgical methods.    {    Review of Systems   Constitutional, HEENT, cardiovascular, pulmonary, gi and gu systems are negative, except as otherwise noted.      Objective           Vitals:  No vitals were obtained today due to virtual visit.    Physical Exam   healthy, alert and no distress  PSYCH: Alert and oriented times 3; coherent speech, normal   rate and volume, able to articulate logical thoughts, able   to abstract reason, no tangential thoughts, no hallucinations   or delusions  Her affect is normal  RESP: No cough, no audible wheezing, able to talk in full sentences  Remainder of exam unable to be completed due to telephone visits        Phone call duration: 30 minutes    .  ..

## 2022-08-19 ENCOUNTER — TELEPHONE (OUTPATIENT)
Dept: FAMILY MEDICINE | Facility: CLINIC | Age: 27
End: 2022-08-19

## 2022-08-19 DIAGNOSIS — Z76.0 ENCOUNTER FOR MEDICATION REFILL: ICD-10-CM

## 2022-08-19 DIAGNOSIS — K58.8 OTHER IRRITABLE BOWEL SYNDROME: ICD-10-CM

## 2022-08-19 DIAGNOSIS — R10.84 ABDOMINAL PAIN, GENERALIZED: ICD-10-CM

## 2022-08-19 DIAGNOSIS — F31.9 BIPOLAR 1 DISORDER (H): ICD-10-CM

## 2022-08-19 RX ORDER — DICYCLOMINE HCL 20 MG
20 TABLET ORAL EVERY 6 HOURS
Qty: 100 TABLET | Refills: 11 | Status: SHIPPED | OUTPATIENT
Start: 2022-08-19 | End: 2022-08-24

## 2022-08-19 RX ORDER — LITHIUM CARBONATE 300 MG
TABLET ORAL
Qty: 150 TABLET | Refills: 11 | Status: SHIPPED | OUTPATIENT
Start: 2022-08-19 | End: 2022-08-24
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
## 2022-08-24 DIAGNOSIS — F41.0 ANXIETY ATTACK: ICD-10-CM

## 2022-08-24 DIAGNOSIS — F39 MOOD DISORDER (H): ICD-10-CM

## 2022-08-24 DIAGNOSIS — K58.2 IRRITABLE BOWEL SYNDROME WITH BOTH CONSTIPATION AND DIARRHEA: ICD-10-CM

## 2022-08-24 DIAGNOSIS — R10.84 ABDOMINAL PAIN, GENERALIZED: ICD-10-CM

## 2022-08-24 DIAGNOSIS — F31.9 BIPOLAR 1 DISORDER (H): Primary | ICD-10-CM

## 2022-08-24 DIAGNOSIS — K58.9 IRRITABLE BOWEL SYNDROME, UNSPECIFIED TYPE: ICD-10-CM

## 2022-08-24 DIAGNOSIS — F41.1 GENERALIZED ANXIETY DISORDER: ICD-10-CM

## 2022-08-24 DIAGNOSIS — Z76.0 ENCOUNTER FOR MEDICATION REFILL: ICD-10-CM

## 2022-08-24 DIAGNOSIS — R19.8 ABDOMINAL BURNING SENSATION IN RIGHT UPPER QUADRANT: ICD-10-CM

## 2022-08-24 DIAGNOSIS — K58.8 OTHER IRRITABLE BOWEL SYNDROME: ICD-10-CM

## 2022-08-24 DIAGNOSIS — F43.10 PTSD (POST-TRAUMATIC STRESS DISORDER): ICD-10-CM

## 2022-08-24 DIAGNOSIS — K63.89 EPIPLOIC APPENDAGITIS: ICD-10-CM

## 2022-08-24 RX ORDER — DICYCLOMINE HCL 20 MG
20 TABLET ORAL EVERY 6 HOURS
Qty: 100 TABLET | Refills: 11 | Status: SHIPPED | OUTPATIENT
Start: 2022-08-24 | End: 2022-12-07

## 2022-08-24 RX ORDER — CITALOPRAM HYDROBROMIDE 20 MG/1
20 TABLET ORAL DAILY
Qty: 30 TABLET | Refills: 11 | Status: SHIPPED | OUTPATIENT
Start: 2022-08-24 | End: 2022-12-07

## 2022-08-24 RX ORDER — LITHIUM CARBONATE 300 MG
TABLET ORAL
Qty: 150 TABLET | Refills: 11 | Status: SHIPPED | OUTPATIENT
Start: 2022-08-24 | End: 2022-12-07

## 2022-08-24 RX ORDER — CLONAZEPAM 1 MG/1
1 TABLET ORAL 2 TIMES DAILY
Qty: 60 TABLET | Refills: 0 | Status: SHIPPED | OUTPATIENT
Start: 2022-08-24 | End: 2022-12-07

## 2022-09-02 ENCOUNTER — TELEPHONE (OUTPATIENT)
Dept: FAMILY MEDICINE | Facility: CLINIC | Age: 27
End: 2022-09-02

## 2022-09-02 ENCOUNTER — NURSE TRIAGE (OUTPATIENT)
Dept: NURSING | Facility: CLINIC | Age: 27
End: 2022-09-02

## 2022-09-02 DIAGNOSIS — Z32.02 PREGNANCY EXAMINATION OR TEST, NEGATIVE RESULT: Primary | ICD-10-CM

## 2022-09-02 NOTE — TELEPHONE ENCOUNTER
Nurse Triage SBAR    Is this a 2nd Level Triage? YES, LICENSED PRACTITIONER REVIEW IS REQUIRED    Situation: Pt calling to request Hcg levels.    Background: Pt had unprotected sex 3 weeks ago and is now late for her period. Did not use Morning After Pill. Last period was 7/26. She says that she is always pretty regular with her cycles, so this is unusual for her.     Assessment: She has had some breast tenderness and pelvic cramping in the last week and thought she was going to get her menstrual cycle, but did not. She says she has taken 5 pregnancy tests and all have been negative. She did not take any of them first thing in the morning. She is interested in having her Hcg levels drawn to calm her anxiety.    Protocol Recommended Disposition:   See in Office Today    Recommendation: Protocol recommends see in office today. Will route message to provider for second level triage and recommendation.     Routed to provider    Does the patient meet one of the following criteria for ADS visit consideration? 16+ years old, with an [facility] PCP     TIP  Providers, please consider if this condition is appropriate for management at one of our Acute and Diagnostic Services sites.     If patient is a good candidate, please use dotphrase <dot>triageresponse and select Refer to ADS to document.    Edith Youssef, RN, BSN  [facility]   Triage Nurse Advisor      Reason for Disposition    Patient wants to be seen    Additional Information    Negative: Sounds like a life-threatening emergency to the triager    Negative: Abdominal pain is present    Negative: Sexually transmitted infection (STI) exposure and prevention, questions about    Negative: Patient sounds very sick or weak to the triager    Protocols used: MENSTRUAL PERIOD - MISSED OR LATE-A-OH

## 2022-09-02 NOTE — TELEPHONE ENCOUNTER
General Call    Contacts       Type Contact Phone/Fax    09/02/2022 10:57 AM CDT Phone (Incoming) Juan R Uriostegui (Self) [phone] (H)        Reason for Call:  Wants to speak to RN or Dr. Donaldson    What are your questions or concerns:  Patient missed her period. She was supposed to get it last week. Home pregnancy test is negative. Patient wants to know what she should do. Should she be concern? No other symptoms currently. She did have some cramping last week.    Date of last appointment with provider: 8/11/22    Could we send this information to you in [employer] or would you prefer to receive a phone call?:   Patient would prefer a phone call   Okay to leave a detailed message?: Yes at Home number on file [phone] (home)

## 2022-09-07 NOTE — TELEPHONE ENCOUNTER
Dr. Donaldson  Patient is requesting the HCG blood test for pregnancy.  Took 5x home pregnancy test and was negative.  However,  missed her menstrual cycle since 7/26 and still has not had it.    Test pending for review and approval.    Thank you    Vivek

## 2022-09-14 ENCOUNTER — TELEPHONE (OUTPATIENT)
Dept: FAMILY MEDICINE | Facility: CLINIC | Age: 27
End: 2022-09-14

## 2022-09-14 NOTE — TELEPHONE ENCOUNTER
Clinic RN contacted patient inquired about amenorrhea and pregnancy test discussed two weeks ago.  RN failed to follow up as provider had not respond back from routed encounter and fell off of contacts.  Patient reported still have not had her period, however, would like to wait until the end of this month if it will show.  Patient contributed the amenorrhea to discontinuation of some or most of her medications.  Took at home pregnancy test continues to show negative results. RN advised to schedule a follow-up with pcp to address the amenorrhea to ensure no under medical concerns.  Patient agreed. However, has a work meeting need to get to. But, will call back after to schedule appointment for her son and herself with pcp.  Clinic contact number provided to patient.     GIL Fierro, RN  [facility]

## 2022-09-14 NOTE — TELEPHONE ENCOUNTER
----- Message from Edith Youssef RN sent at 9/13/2022 11:33 AM CDT -----  Regarding: Pt follow up for MRN   [id number]  Hi Vivek,    I see that there is still an open encounter for this patient from almost two weeks ago. Were you able to follow up with her about her missed period or Hcg test?     Thanks,  Edith Youssef RN, BSN  [facility]

## 2022-09-24 ENCOUNTER — HEALTH MAINTENANCE LETTER (OUTPATIENT)
Age: 27
End: 2022-09-24

## 2022-10-13 ENCOUNTER — TELEPHONE (OUTPATIENT)
Dept: FAMILY MEDICINE | Facility: CLINIC | Age: 27
End: 2022-10-13

## 2022-10-13 NOTE — TELEPHONE ENCOUNTER
Pt calling back, is in too much pain and needs Covering Provider to please prescribe something or call with recommendations.

## 2022-10-13 NOTE — TELEPHONE ENCOUNTER
Reached out to patient to relay provider message. However, no answer.  Message left on vm recommended to contact dentist who did the procedure or go to urgent care for pain medication.  Clinic number provided for further questions.    GIL Fierro, RN  [facility]

## 2022-10-13 NOTE — TELEPHONE ENCOUNTER
Will re-route encounter to covering provider for review and recommendation.    ABHISHEK FierroN, RN  [facility]

## 2022-10-13 NOTE — TELEPHONE ENCOUNTER
"Patient called in requesting \"something stronger to control pain for a few days\".  Pain is from the two wisdom teeth extraction on 10/12/2022. Ibuprofen gave but cannot take due to GI issue.  Tylenol took but not effective.  Rated pain at a 10/10.      Will route to covering provider for review and recommendation.    ABHISHEK FierroN, RN  [facility]    "

## 2022-12-01 ENCOUNTER — TELEPHONE (OUTPATIENT)
Dept: FAMILY MEDICINE | Facility: CLINIC | Age: 27
End: 2022-12-01

## 2022-12-01 ENCOUNTER — APPOINTMENT (OUTPATIENT)
Dept: RADIOLOGY | Facility: CLINIC | Age: 27
End: 2022-12-01
Attending: STUDENT IN AN ORGANIZED HEALTH CARE EDUCATION/TRAINING PROGRAM
Payer: COMMERCIAL

## 2022-12-01 ENCOUNTER — HOSPITAL ENCOUNTER (EMERGENCY)
Facility: CLINIC | Age: 27
Discharge: LEFT BEFORE DISCHARGE | End: 2022-12-01
Admitting: EMERGENCY MEDICINE
Payer: COMMERCIAL

## 2022-12-01 VITALS
HEIGHT: 64 IN | DIASTOLIC BLOOD PRESSURE: 78 MMHG | OXYGEN SATURATION: 100 % | RESPIRATION RATE: 18 BRPM | SYSTOLIC BLOOD PRESSURE: 124 MMHG | TEMPERATURE: 98.4 F | HEART RATE: 103 BPM | WEIGHT: 250 LBS | BODY MASS INDEX: 42.68 KG/M2

## 2022-12-01 LAB
ATRIAL RATE - MUSE: 87 BPM
DIASTOLIC BLOOD PRESSURE - MUSE: NORMAL MMHG
FLUAV RNA SPEC QL NAA+PROBE: NEGATIVE
FLUBV RNA RESP QL NAA+PROBE: NEGATIVE
INTERPRETATION ECG - MUSE: NORMAL
P AXIS - MUSE: 32 DEGREES
PR INTERVAL - MUSE: 160 MS
QRS DURATION - MUSE: 82 MS
QT - MUSE: 362 MS
QTC - MUSE: 435 MS
R AXIS - MUSE: 9 DEGREES
RSV RNA SPEC NAA+PROBE: NEGATIVE
SARS-COV-2 RNA RESP QL NAA+PROBE: POSITIVE
SYSTOLIC BLOOD PRESSURE - MUSE: NORMAL MMHG
T AXIS - MUSE: 17 DEGREES
VENTRICULAR RATE- MUSE: 87 BPM

## 2022-12-01 PROCEDURE — 250N000013 HC RX MED GY IP 250 OP 250 PS 637: Performed by: STUDENT IN AN ORGANIZED HEALTH CARE EDUCATION/TRAINING PROGRAM

## 2022-12-01 PROCEDURE — 87637 SARSCOV2&INF A&B&RSV AMP PRB: CPT | Performed by: STUDENT IN AN ORGANIZED HEALTH CARE EDUCATION/TRAINING PROGRAM

## 2022-12-01 PROCEDURE — 71046 X-RAY EXAM CHEST 2 VIEWS: CPT

## 2022-12-01 PROCEDURE — 999N000104 HC STATISTIC NO CHARGE

## 2022-12-01 PROCEDURE — 93005 ELECTROCARDIOGRAM TRACING: CPT | Performed by: STUDENT IN AN ORGANIZED HEALTH CARE EDUCATION/TRAINING PROGRAM

## 2022-12-01 RX ORDER — ACETAMINOPHEN 325 MG/1
975 TABLET ORAL ONCE
Status: COMPLETED | OUTPATIENT
Start: 2022-12-01 | End: 2022-12-01

## 2022-12-01 RX ADMIN — ACETAMINOPHEN 975 MG: 325 TABLET ORAL at 15:08

## 2022-12-01 NOTE — ED TRIAGE NOTES
PT presents with shortness of breath and chest pain. Pt tested positive today and symptoms started 3 days ago. Hasn't taken anything to relieve symptoms      Triage Assessment     Row Name 12/01/22 1501       Triage Assessment (Adult)    Airway WDL WDL       Respiratory WDL    Respiratory WDL X;rhythm/pattern    Rhythm/Pattern, Respiratory shortness of breath       Skin Circulation/Temperature WDL    Skin Circulation/Temperature WDL WDL       Cardiac WDL    Cardiac WDL X;chest pain;rhythm    Pulse Rate & Regularity tachycardic       Chest Pain Assessment    Chest Pain Location midsternal    Character pressure       Peripheral/Neurovascular WDL    Peripheral Neurovascular WDL WDL       Cognitive/Neuro/Behavioral WDL    Cognitive/Neuro/Behavioral WDL WDL

## 2022-12-01 NOTE — ED NOTES
"ED Provider In Triage Note  [facility]  Encounter Date: Dec 1, 2022    Chief Complaint   Patient presents with     Shortness of Breath     Chest Pain       Brief HPI:   Juan R Uriostegui is a 27 year old female presenting to the Emergency Department with a chief complaint of;    Chest pain and SOB x3 days  Tested positive for COVID today  No meds taken prior to arrival  No distress, but appears fatigued    Brief Physical Exam:  /78   Pulse 103   Temp 98.4  F (36.9  C) (Temporal)   Resp 18   Ht 1.626 m (5' 4\")   Wt 113.4 kg (250 lb)   SpO2 100%   BMI 42.91 kg/m    General: Non-toxic appearing  HEENT: Atraumatic  Resp: No respiratory distress  Abdomen: Non-peritoneal  Neuro: Alert, oriented, answers questions appropriately  Psych: Behavior appropriate      Plan Initiated in Triage:  Orders Placed This Encounter     Chest XR,  PA & LAT     Symptomatic; Unknown Influenza A/B & SARS-CoV2 (COVID-19) Virus PCR Multiplex Nasopharyngeal     acetaminophen (TYLENOL) tablet 975 mg       PIT Dispo:   Return to lob while awaiting workup and ED bed availability    Pola Espinoza MD on 12/1/2022 at 3:09 PM    Patient was evaluated by the Physician in Triage due to a limitation of available rooms in the Emergency Department. A plan of care was discussed based on the information obtained on the initial evaluation and patient was consuled to return back to the Emergency Department lobby after this initial evalutaiton until results were obtained or a room became available in the Emergency Department. Patient was counseled not to leave prior to receiving the results of their workup.     MD SOWMYA Chung [facility] EMERGENCY ROOM  [address] [place] [id number]  [phone]     Pola Espinoza MD  12/01/22 1510    "

## 2022-12-01 NOTE — TELEPHONE ENCOUNTER
Called pt in order to relay message from provider regarding COVID positive. Pt stated she is currently at the ED due to COVID positive. She requested her appt on 12/7 be changed to a virtual as well. Writer changed appt type per pt request.    Ac Novak Cem Say, BSN RN  [facility]

## 2022-12-01 NOTE — TELEPHONE ENCOUNTER
General Call      Reason for Call:  Medical question    What are your questions or concerns:  Pt tested positive for covid, would like a call back for covid care with her symptoms. Please call and advise, thanks!    Date of last appointment with provider: 8/11/2022    Could we send this information to you in [place] or would you prefer to receive a phone call?:   Patient would prefer a phone call   Okay to leave a detailed message?: Yes at Home number on file [phone] (home)

## 2022-12-07 ENCOUNTER — VIRTUAL VISIT (OUTPATIENT)
Dept: FAMILY MEDICINE | Facility: CLINIC | Age: 27
End: 2022-12-07
Payer: COMMERCIAL

## 2022-12-07 DIAGNOSIS — F39 MOOD DISORDER (H): ICD-10-CM

## 2022-12-07 DIAGNOSIS — R50.9 FEVER AND CHILLS: ICD-10-CM

## 2022-12-07 DIAGNOSIS — F41.0 ANXIETY ATTACK: ICD-10-CM

## 2022-12-07 DIAGNOSIS — U07.1 INFECTION DUE TO 2019 NOVEL CORONAVIRUS: Primary | ICD-10-CM

## 2022-12-07 DIAGNOSIS — R10.84 ABDOMINAL PAIN, GENERALIZED: ICD-10-CM

## 2022-12-07 DIAGNOSIS — K58.8 OTHER IRRITABLE BOWEL SYNDROME: ICD-10-CM

## 2022-12-07 DIAGNOSIS — F31.9 BIPOLAR 1 DISORDER (H): ICD-10-CM

## 2022-12-07 DIAGNOSIS — Z76.0 ENCOUNTER FOR MEDICATION REFILL: ICD-10-CM

## 2022-12-07 DIAGNOSIS — M62.89 PELVIC FLOOR DYSFUNCTION IN FEMALE: ICD-10-CM

## 2022-12-07 DIAGNOSIS — K58.2 IRRITABLE BOWEL SYNDROME WITH BOTH CONSTIPATION AND DIARRHEA: ICD-10-CM

## 2022-12-07 PROCEDURE — 99214 OFFICE O/P EST MOD 30 MIN: CPT | Mod: 95 | Performed by: FAMILY MEDICINE

## 2022-12-07 RX ORDER — ALBUTEROL SULFATE 90 UG/1
2 AEROSOL, METERED RESPIRATORY (INHALATION) EVERY 6 HOURS
Qty: 18 G | Refills: 1 | Status: SHIPPED | OUTPATIENT
Start: 2022-12-07 | End: 2023-06-06

## 2022-12-07 RX ORDER — PREGABALIN 75 MG/1
75 CAPSULE ORAL 3 TIMES DAILY
Qty: 90 CAPSULE | Refills: 2 | Status: SHIPPED | OUTPATIENT
Start: 2022-12-07 | End: 2023-09-20

## 2022-12-07 RX ORDER — CITALOPRAM HYDROBROMIDE 20 MG/1
20 TABLET ORAL DAILY
Qty: 30 TABLET | Refills: 11 | Status: SHIPPED | OUTPATIENT
Start: 2022-12-07

## 2022-12-07 RX ORDER — DICYCLOMINE HCL 20 MG
20 TABLET ORAL EVERY 6 HOURS
Qty: 100 TABLET | Refills: 11 | Status: SHIPPED | OUTPATIENT
Start: 2022-12-07 | End: 2023-03-21

## 2022-12-07 RX ORDER — CLONAZEPAM 1 MG/1
1 TABLET ORAL 2 TIMES DAILY
Qty: 60 TABLET | Refills: 0 | Status: SHIPPED | OUTPATIENT
Start: 2022-12-07 | End: 2023-07-05

## 2022-12-07 RX ORDER — LITHIUM CARBONATE 300 MG
TABLET ORAL
Qty: 150 TABLET | Refills: 11 | Status: SHIPPED | OUTPATIENT
Start: 2022-12-07 | End: 2023-03-21

## 2022-12-07 NOTE — PROGRESS NOTES
Juan R is a 27 year old who is being evaluated via a billable telephone visit.      What phone number would you like to be contacted at? [phone]  How would you like to obtain your AVS? MyChart    Assessment & Plan     Infection due to 2019 novel coronavirus  Patient notes shortness of breath with minor activity she has been checking her O2 sats ranged from 94 to 95% at rest she has not checked at home being active she will take albuterol if she develops a fever increasing weakness she will proceed back to the ER  - albuterol (PROAIR HFA/PROVENTIL HFA/VENTOLIN HFA) 108 (90 Base) MCG/ACT inhaler; Inhale 2 puffs into the lungs every 6 hours    Fever and chills  Patient feels chills that occur every 6-8 hours she has not noticed a fever she has not been checking her temperature she will take Tylenol if she develops a fever she will check her temperature regularly    Bipolar 1 disorder (H)    Patient's been having difficulty obtaining her medication from her pharmacy 1 years refills were given I refilled her lithium I will have her come back in clinic after 2 months to check a level  - pregabalin (LYRICA) 75 MG capsule; Take 1 capsule (75 mg) by mouth 3 times daily  - lithium (ESKALITH) 300 MG tablet; Take 600 mg in the morning , 300 mg in the afternoon  And 600 mg at nighttime    Pelvic floor dysfunction in female    She been having pain because she has not had access to her Lyrica  - pregabalin (LYRICA) 75 MG capsule; Take 1 capsule (75 mg) by mouth 3 times daily    Encounter for medication refill      - dicyclomine (BENTYL) 20 MG tablet; Take 1 tablet (20 mg) by mouth every 6 hours  - lithium (ESKALITH) 300 MG tablet; Take 600 mg in the morning , 300 mg in the afternoon  And 600 mg at nighttime  - clonazePAM (KLONOPIN) 1 MG tablet; Take 1 tablet (1 mg) by mouth 2 times daily    Other irritable bowel syndrome    Abdominal discomfort noted decreased appetite noted Bentyl refilled  - dicyclomine (BENTYL) 20 MG  "tablet; Take 1 tablet (20 mg) by mouth every 6 hours    Abdominal pain, generalized      - dicyclomine (BENTYL) 20 MG tablet; Take 1 tablet (20 mg) by mouth every 6 hours    Mood disorder (H)      - citalopram (CELEXA) 20 MG tablet; Take 1 tablet (20 mg) by mouth daily    Irritable bowel syndrome with both constipation and diarrhea      - citalopram (CELEXA) 20 MG tablet; Take 1 tablet (20 mg) by mouth daily    Anxiety attack      - clonazePAM (KLONOPIN) 1 MG tablet; Take 1 tablet (1 mg) by mouth 2 times daily      }     BMI:   Estimated body mass index is 42.91 kg/m  as calculated from the following:    Height as of 12/1/22: 1.626 m (5' 4\").    Weight as of 12/1/22: 113.4 kg (250 lb).           No follow-ups on file.    Dixon Donaldson MD  [facility] JOSE Pete is a 27 year old, presenting for the following health issues:  OTHER (Tested pos for covid last week and still sick   SOB Fatigue  achey  headache )      HPI   27-year-old female here on virtual telephone encounter for follow-up after being seen at [facility] last week.  She has been ill for approximately 6 days.  Her illness started with feelings of a cough and body aches she did a COVID-19 test at home which was negative and she proceeded to work and then found that she was feeling weak again she took a repeat COVID test there was found to be positive.  She also has been feeling short of breath for approximately 3 days.  She was seen also at [facility] because she was feeling tired weak and achy there she was triaged but left because of the weight involved.  Influenza and COVID test prior to her second home test were negative.  She reports that she has not been to work this week.  She reports that she is fatigued mainly when she tries to walk around her home she has not had any access to her medications specifically her medications for bipolar disorder depression anxiety.  She also has been experiencing " abdominal discomfort because she does not have any access to her Bentyl.        Review of Systems   Pulmonary positive for shortness of breath no wheezing when she is walking.  General positive for body ache occasional fever and noted not measured  GI positive for abdominal discomfort and bloating  Psych positive for difficulty sleeping anxiety is elevated at night  10 point review      Objective           Vitals:  No vitals were obtained today due to virtual visit.    Physical Exam   healthy, alert and no distress  PSYCH: Alert and oriented times 3; coherent speech, normal   rate and volume, able to articulate logical thoughts, able   to abstract reason, no tangential thoughts, no hallucinations   or delusions  Her affect is normal  RESP: No cough, no audible wheezing, able to talk in full sentences  Remainder of exam unable to be completed due to telephone visits            Phone call duration: 30 minutes

## 2023-01-19 ENCOUNTER — APPOINTMENT (OUTPATIENT)
Dept: CT IMAGING | Facility: HOSPITAL | Age: 28
End: 2023-01-19
Attending: EMERGENCY MEDICINE
Payer: COMMERCIAL

## 2023-01-19 ENCOUNTER — HOSPITAL ENCOUNTER (EMERGENCY)
Facility: HOSPITAL | Age: 28
Discharge: HOME | End: 2023-01-19
Attending: EMERGENCY MEDICINE | Admitting: EMERGENCY MEDICINE
Payer: COMMERCIAL

## 2023-01-19 VITALS
WEIGHT: 250 LBS | RESPIRATION RATE: 16 BRPM | BODY MASS INDEX: 42.68 KG/M2 | OXYGEN SATURATION: 97 % | HEIGHT: 64 IN | DIASTOLIC BLOOD PRESSURE: 77 MMHG | HEART RATE: 73 BPM | SYSTOLIC BLOOD PRESSURE: 125 MMHG | TEMPERATURE: 97.7 F

## 2023-01-19 DIAGNOSIS — N39.0 URINARY TRACT INFECTION WITHOUT HEMATURIA, SITE UNSPECIFIED: ICD-10-CM

## 2023-01-19 DIAGNOSIS — R10.9 RIGHT FLANK PAIN: ICD-10-CM

## 2023-01-19 LAB
ALBUMIN SERPL BCG-MCNC: 3.6 G/DL (ref 3.5–5.2)
ALBUMIN UR-MCNC: NEGATIVE MG/DL
ALP SERPL-CCNC: 87 U/L (ref 35–104)
ALT SERPL W P-5'-P-CCNC: 14 U/L (ref 10–35)
ANION GAP SERPL CALCULATED.3IONS-SCNC: 7 MMOL/L (ref 7–15)
APPEARANCE UR: ABNORMAL
AST SERPL W P-5'-P-CCNC: 25 U/L (ref 10–35)
BACTERIA #/AREA URNS HPF: ABNORMAL /HPF
BASOPHILS # BLD AUTO: 0.1 10E3/UL (ref 0–0.2)
BASOPHILS NFR BLD AUTO: 1 %
BILIRUB DIRECT SERPL-MCNC: <0.2 MG/DL (ref 0–0.3)
BILIRUB SERPL-MCNC: 0.2 MG/DL
BILIRUB UR QL STRIP: NEGATIVE
BUN SERPL-MCNC: 6.7 MG/DL (ref 6–20)
CALCIUM SERPL-MCNC: 8.8 MG/DL (ref 8.6–10)
CHLORIDE SERPL-SCNC: 106 MMOL/L (ref 98–107)
COLOR UR AUTO: ABNORMAL
CREAT SERPL-MCNC: 0.75 MG/DL (ref 0.51–0.95)
DEPRECATED HCO3 PLAS-SCNC: 26 MMOL/L (ref 22–29)
EOSINOPHIL # BLD AUTO: 0.1 10E3/UL (ref 0–0.7)
EOSINOPHIL NFR BLD AUTO: 1 %
ERYTHROCYTE [DISTWIDTH] IN BLOOD BY AUTOMATED COUNT: 15.8 % (ref 10–15)
GFR SERPL CREATININE-BSD FRML MDRD: >90 ML/MIN/1.73M2
GLUCOSE SERPL-MCNC: 99 MG/DL (ref 70–99)
GLUCOSE UR STRIP-MCNC: NEGATIVE MG/DL
HCG SERPL QL: NEGATIVE
HCG UR QL: NEGATIVE
HCT VFR BLD AUTO: 34.3 % (ref 35–47)
HGB BLD-MCNC: 10.4 G/DL (ref 11.7–15.7)
HGB UR QL STRIP: NEGATIVE
HOLD SPECIMEN: NORMAL
HOLD SPECIMEN: NORMAL
IMM GRANULOCYTES # BLD: 0 10E3/UL
IMM GRANULOCYTES NFR BLD: 0 %
KETONES UR STRIP-MCNC: NEGATIVE MG/DL
LEUKOCYTE ESTERASE UR QL STRIP: NEGATIVE
LIPASE SERPL-CCNC: 18 U/L (ref 13–60)
LYMPHOCYTES # BLD AUTO: 2.7 10E3/UL (ref 0.8–5.3)
LYMPHOCYTES NFR BLD AUTO: 28 %
MCH RBC QN AUTO: 23.3 PG (ref 26.5–33)
MCHC RBC AUTO-ENTMCNC: 30.3 G/DL (ref 31.5–36.5)
MCV RBC AUTO: 77 FL (ref 78–100)
MONOCYTES # BLD AUTO: 0.4 10E3/UL (ref 0–1.3)
MONOCYTES NFR BLD AUTO: 4 %
MUCOUS THREADS #/AREA URNS LPF: PRESENT /LPF
NEUTROPHILS # BLD AUTO: 6.3 10E3/UL (ref 1.6–8.3)
NEUTROPHILS NFR BLD AUTO: 66 %
NITRATE UR QL: NEGATIVE
NRBC # BLD AUTO: 0 10E3/UL
NRBC BLD AUTO-RTO: 0 /100
PH UR STRIP: 6 [PH] (ref 5–7)
PLATELET # BLD AUTO: 320 10E3/UL (ref 150–450)
POTASSIUM SERPL-SCNC: 3.5 MMOL/L (ref 3.4–5.3)
PROT SERPL-MCNC: 6.7 G/DL (ref 6.4–8.3)
RBC # BLD AUTO: 4.46 10E6/UL (ref 3.8–5.2)
RBC URINE: 2 /HPF
SODIUM SERPL-SCNC: 139 MMOL/L (ref 136–145)
SP GR UR STRIP: 1.02 (ref 1–1.03)
SPERM #/AREA URNS HPF: PRESENT /HPF
SQUAMOUS EPITHELIAL: 6 /HPF
UROBILINOGEN UR STRIP-MCNC: <2 MG/DL
WBC # BLD AUTO: 9.5 10E3/UL (ref 4–11)
WBC URINE: 4 /HPF

## 2023-01-19 PROCEDURE — 81025 URINE PREGNANCY TEST: CPT | Performed by: EMERGENCY MEDICINE

## 2023-01-19 PROCEDURE — 82248 BILIRUBIN DIRECT: CPT | Performed by: EMERGENCY MEDICINE

## 2023-01-19 PROCEDURE — 84703 CHORIONIC GONADOTROPIN ASSAY: CPT | Performed by: EMERGENCY MEDICINE

## 2023-01-19 PROCEDURE — 85025 COMPLETE CBC W/AUTO DIFF WBC: CPT | Performed by: EMERGENCY MEDICINE

## 2023-01-19 PROCEDURE — 83690 ASSAY OF LIPASE: CPT | Performed by: EMERGENCY MEDICINE

## 2023-01-19 PROCEDURE — 81001 URINALYSIS AUTO W/SCOPE: CPT | Performed by: EMERGENCY MEDICINE

## 2023-01-19 PROCEDURE — 250N000013 HC RX MED GY IP 250 OP 250 PS 637: Performed by: EMERGENCY MEDICINE

## 2023-01-19 PROCEDURE — 99285 EMERGENCY DEPT VISIT HI MDM: CPT | Mod: 25

## 2023-01-19 PROCEDURE — 96375 TX/PRO/DX INJ NEW DRUG ADDON: CPT

## 2023-01-19 PROCEDURE — 250N000009 HC RX 250: Performed by: EMERGENCY MEDICINE

## 2023-01-19 PROCEDURE — 87086 URINE CULTURE/COLONY COUNT: CPT | Performed by: EMERGENCY MEDICINE

## 2023-01-19 PROCEDURE — 250N000011 HC RX IP 250 OP 636: Performed by: EMERGENCY MEDICINE

## 2023-01-19 PROCEDURE — 96374 THER/PROPH/DIAG INJ IV PUSH: CPT

## 2023-01-19 PROCEDURE — 36415 COLL VENOUS BLD VENIPUNCTURE: CPT | Performed by: EMERGENCY MEDICINE

## 2023-01-19 PROCEDURE — 74176 CT ABD & PELVIS W/O CONTRAST: CPT

## 2023-01-19 PROCEDURE — 80053 COMPREHEN METABOLIC PANEL: CPT | Performed by: EMERGENCY MEDICINE

## 2023-01-19 RX ORDER — KETOROLAC TROMETHAMINE 15 MG/ML
15 INJECTION, SOLUTION INTRAMUSCULAR; INTRAVENOUS ONCE
Status: COMPLETED | OUTPATIENT
Start: 2023-01-19 | End: 2023-01-19

## 2023-01-19 RX ORDER — ACETAMINOPHEN 325 MG/1
975 TABLET ORAL ONCE
Status: COMPLETED | OUTPATIENT
Start: 2023-01-19 | End: 2023-01-19

## 2023-01-19 RX ORDER — ONDANSETRON 2 MG/ML
4 INJECTION INTRAMUSCULAR; INTRAVENOUS ONCE
Status: COMPLETED | OUTPATIENT
Start: 2023-01-19 | End: 2023-01-19

## 2023-01-19 RX ORDER — CEFDINIR 300 MG/1
300 CAPSULE ORAL ONCE
Status: COMPLETED | OUTPATIENT
Start: 2023-01-19 | End: 2023-01-19

## 2023-01-19 RX ORDER — ONDANSETRON 4 MG/1
4 TABLET, ORALLY DISINTEGRATING ORAL EVERY 8 HOURS PRN
Qty: 20 TABLET | Refills: 0 | Status: SHIPPED | OUTPATIENT
Start: 2023-01-19

## 2023-01-19 RX ORDER — CEFDINIR 300 MG/1
300 CAPSULE ORAL 2 TIMES DAILY
Qty: 20 CAPSULE | Refills: 0 | Status: SHIPPED | OUTPATIENT
Start: 2023-01-19 | End: 2023-01-29

## 2023-01-19 RX ADMIN — CEFDINIR 300 MG: 300 CAPSULE ORAL at 13:36

## 2023-01-19 RX ADMIN — LIDOCAINE HYDROCHLORIDE 30 ML: 20 SOLUTION ORAL; TOPICAL at 13:38

## 2023-01-19 RX ADMIN — ACETAMINOPHEN 975 MG: 325 TABLET ORAL at 13:36

## 2023-01-19 RX ADMIN — KETOROLAC TROMETHAMINE 15 MG: 15 INJECTION, SOLUTION INTRAMUSCULAR; INTRAVENOUS at 11:52

## 2023-01-19 RX ADMIN — ONDANSETRON 4 MG: 2 INJECTION INTRAMUSCULAR; INTRAVENOUS at 11:52

## 2023-01-19 ASSESSMENT — ENCOUNTER SYMPTOMS
ABDOMINAL PAIN: 1
BLOOD IN STOOL: 0
NAUSEA: 1
VOMITING: 0
DIARRHEA: 0

## 2023-01-19 NOTE — ED NOTES
"ED Provider In Triage Note  [facility]  Encounter Date: Jan 19, 2023    Chief Complaint   Patient presents with     Abdominal Pain       Brief HPI:   Juan R Uriostegui is a 27 year old female presenting to the Emergency Department with a chief complaint of right flank pain.  States 10 out of 10 pain, but appears comfortable.    Brief Physical Exam:  /77   Pulse 73   Temp 97.7  F (36.5  C) (Tympanic)   Resp 16   Ht 1.626 m (5' 4\")   Wt 113.4 kg (250 lb)   SpO2 97%   BMI 42.91 kg/m    General: Non-toxic appearing  HEENT: Atraumatic  Resp: No respiratory distress  Abdomen: Non-peritoneal.  Points to right flank as source of pain.  Neuro: Alert, oriented, answers questions appropriately  Psych: Behavior appropriate      Plan Initiated in Triage:  Orders Placed This Encounter     Abd/pelvis CT no contrast - Stone Protocol     HCG qualitative urine     UA with Microscopic reflex to Culture     Basic metabolic panel     ondansetron (ZOFRAN) injection 4 mg     ketorolac (TORADOL) injection 15 mg       PIT Dispo:   Return to [facility] while awaiting workup and ED bed availability    Cricket Aquino MD on 1/19/2023 at 11:15 AM    Patient was evaluated by the Physician in Triage due to a limitation of available rooms in the Emergency Department. A plan of care was discussed based on the information obtained on the initial evaluation and patient was consuled to return back to the Emergency Department lobby after this initial evalutaiton until results were obtained or a room became available in the Emergency Department. Patient was counseled not to leave prior to receiving the results of their workup.     Cricket Aquino MD  [facility] EMERGENCY DEPARTMENT  [address] [place] [id number]  [phone]     Cricket Aquino MD  01/19/23 1116       Cricket Aquino MD  01/19/23 1134    "

## 2023-01-19 NOTE — DISCHARGE INSTRUCTIONS
Take the antibiotic (cefdinir) for your urine infection.    Use the Zofran for any nausea or vomiting.    As needed for pain control at home, take:  - over-the-counter ibuprofen 800mg by mouth every 8 hours (max dose 2400mg in 24 hours)  - over-the-counter acetaminophen 1g by mouth every 6 hours (max dose 4g in 24 hours)    Follow up with your Primary Care provider in 2 days for a recheck.    Return to the Emergency Department for any persistent vomiting, severe worsening, or any other concerns.
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
## 2023-01-19 NOTE — ED TRIAGE NOTES
Pt c/o of right abd and flank pain since last evening that she rates 10/10. Has dysuria and nausea.  Pt also reports a 10 minute episode of right arm and then right leg numbess this am while in the bath tub. That as gone away now.

## 2023-01-21 LAB — BACTERIA UR CULT: NORMAL

## 2023-03-21 ENCOUNTER — VIRTUAL VISIT (OUTPATIENT)
Dept: FAMILY MEDICINE | Facility: CLINIC | Age: 28
End: 2023-03-21
Payer: COMMERCIAL

## 2023-03-21 DIAGNOSIS — F31.9 BIPOLAR 1 DISORDER (H): ICD-10-CM

## 2023-03-21 DIAGNOSIS — S93.492D SPRAIN OF ANTERIOR TALOFIBULAR LIGAMENT OF LEFT ANKLE, SUBSEQUENT ENCOUNTER: ICD-10-CM

## 2023-03-21 DIAGNOSIS — N97.9 INFERTILITY, FEMALE, SECONDARY: Primary | ICD-10-CM

## 2023-03-21 DIAGNOSIS — K58.8 OTHER IRRITABLE BOWEL SYNDROME: ICD-10-CM

## 2023-03-21 DIAGNOSIS — R19.8 ABDOMINAL BURNING SENSATION IN RIGHT UPPER QUADRANT: ICD-10-CM

## 2023-03-21 DIAGNOSIS — Z76.0 ENCOUNTER FOR MEDICATION REFILL: ICD-10-CM

## 2023-03-21 DIAGNOSIS — R10.84 ABDOMINAL PAIN, GENERALIZED: ICD-10-CM

## 2023-03-21 PROCEDURE — 36415 COLL VENOUS BLD VENIPUNCTURE: CPT | Mod: 93 | Performed by: FAMILY MEDICINE

## 2023-03-21 PROCEDURE — 84443 ASSAY THYROID STIM HORMONE: CPT | Mod: 93 | Performed by: FAMILY MEDICINE

## 2023-03-21 PROCEDURE — 99214 OFFICE O/P EST MOD 30 MIN: CPT | Mod: 93 | Performed by: FAMILY MEDICINE

## 2023-03-21 PROCEDURE — 84146 ASSAY OF PROLACTIN: CPT | Mod: 93 | Performed by: FAMILY MEDICINE

## 2023-03-21 PROCEDURE — 83001 ASSAY OF GONADOTROPIN (FSH): CPT | Mod: 93 | Performed by: FAMILY MEDICINE

## 2023-03-21 PROCEDURE — 83520 IMMUNOASSAY QUANT NOS NONAB: CPT | Mod: 93 | Performed by: FAMILY MEDICINE

## 2023-03-21 PROCEDURE — 83002 ASSAY OF GONADOTROPIN (LH): CPT | Mod: 93 | Performed by: FAMILY MEDICINE

## 2023-03-21 RX ORDER — CELECOXIB 100 MG/1
100 CAPSULE ORAL 2 TIMES DAILY
Qty: 60 CAPSULE | Refills: 1 | Status: SHIPPED | OUTPATIENT
Start: 2023-03-21 | End: 2023-06-06

## 2023-03-21 RX ORDER — DICYCLOMINE HCL 20 MG
20 TABLET ORAL EVERY 6 HOURS
Qty: 100 TABLET | Refills: 11 | Status: SHIPPED | OUTPATIENT
Start: 2023-03-21 | End: 2023-08-30

## 2023-03-21 RX ORDER — LITHIUM CARBONATE 300 MG
TABLET ORAL
Qty: 150 TABLET | Refills: 11 | Status: SHIPPED | OUTPATIENT
Start: 2023-03-21 | End: 2023-09-20

## 2023-03-21 NOTE — PROGRESS NOTES
"Juan R is a 28 year old who is being evaluated via a billable telephone visit.      What phone number would you like to be contacted at? See chart  How would you like to obtain your AVS? Hongt  Distant Location (provider location):  On-site    Assessment & Plan     Infertility, female, secondary  Patient is not been able to conceive for 2 years.  Her periods are relatively regular she gets them monthly.  Her  apparently has had his sperm counts checked and are found to be normal I do not have access to those results however he does smoke which could affect motility.  Patient will come in for lab test.  We discussed the importance of timing colitis around the mid cycle.  She also needs to monitor her basal body temperature.  - TSH with free T4 reflex; Future  - Follicle stimulating hormone; Future  - Luteinizing Hormone; Future  - Prolactin; Future  - Anti-Mullerian hormone; Future             BMI:   Estimated body mass index is 42.91 kg/m  as calculated from the following:    Height as of 1/19/23: 1.626 m (5' 4\").    Weight as of 1/19/23: 113.4 kg (250 lb).           No follow-ups on file.    Dixon Donaldson MD  [facility]CHARMAINE Pete is a 28 year old, presenting for the following health issues:  No chief complaint on file.      HPI   28-year-old female here on a virtual telephone visit for discussion of infertility patient has been trying to get pregnant for 2 years.  She is  and she has unprotected sex with her .  They do not use contraception or condoms.  She states that they have never achieved fertilization even once.  She has 1 child age 6 years.  He also has a child but someone else so he has conceived with someone else.  He had lab test and apparently sperm counts are normal he does smoke however.  Patient reports she does not smoke cigarettes.  She has regular menstrual cycles.  She has not noticed any loss of weight difficulty with sleep she does not " drink alcohol regularly.        Review of Systems   Constitutional, HEENT, cardiovascular, pulmonary, gi and gu systems are negative, except as otherwise noted.      Objective           Vitals:  No vitals were obtained today due to virtual visit.    Physical Exam   healthy, alert and no distress  PSYCH: Alert and oriented times 3; coherent speech, normal   rate and volume, able to articulate logical thoughts, able   to abstract reason, no tangential thoughts, no hallucinations   or delusions  Her affect is normal  RESP: No cough, no audible wheezing, able to talk in full sentences  Remainder of exam unable to be completed due to telephone visits                Phone call duration: 30 minutes

## 2023-03-22 DIAGNOSIS — N97.9 FEMALE INFERTILITY: Primary | ICD-10-CM

## 2023-03-22 LAB
FSH SERPL IRP2-ACNC: 5.1 MIU/ML
LH SERPL-ACNC: 4.2 MIU/ML
MIS SERPL-MCNC: 1.02 NG/ML (ref 0.89–9.9)
PROLACTIN SERPL 3RD IS-MCNC: 9 NG/ML (ref 5–23)
TSH SERPL DL<=0.005 MIU/L-ACNC: 1.24 UIU/ML (ref 0.3–4.2)

## 2023-03-22 NOTE — TELEPHONE ENCOUNTER
"Requesting a 90 day supply at a time    Last Written Prescription Date: 3/21/2023  Last Fill Quantity: 30,  # refills: 11   Last office visit provider:  3/22/2023     Requested Prescriptions   Pending Prescriptions Disp Refills     omeprazole (PRILOSEC) 20 MG DR capsule [Pharmacy Med Name: OMEPRAZOLE 20MG CAPSULES] 90 capsule 3     Sig: TAKE 1 CAPSULE(20 MG) BY MOUTH DAILY BEFORE AND BREAKFAST       PPI Protocol Passed - 3/21/2023  6:42 PM        Passed - Not on Clopidogrel (unless Pantoprazole ordered)        Passed - No diagnosis of osteoporosis on record        Passed - Recent (12 mo) or future (30 days) visit within the authorizing provider's specialty     Patient has had an office visit with the authorizing provider or a provider within the authorizing providers department within the previous 12 mos or has a future within next 30 days. See \"Patient Info\" tab in inbasket, or \"Choose Columns\" in Meds & Orders section of the refill encounter.              Passed - Medication is active on med list        Passed - Patient is age 18 or older        Passed - No active pregnacy on record        Passed - No positive pregnancy test in past 12 months             Jackie De Leon RN 03/22/23 1:34 PM  "

## 2023-03-22 NOTE — RESULT ENCOUNTER NOTE
Please call the patient and let her know that all lab tests from yesterday's visit are normal because she is having any problems conceiving I will be sending her to an OB/GYN who will need to do studies which may involve endoscopy looking at her fallopian tubes and/or uterus

## 2023-04-11 ENCOUNTER — OFFICE VISIT (OUTPATIENT)
Dept: OBGYN | Facility: CLINIC | Age: 28
End: 2023-04-11
Attending: FAMILY MEDICINE
Payer: COMMERCIAL

## 2023-04-11 VITALS
BODY MASS INDEX: 42.91 KG/M2 | HEART RATE: 86 BPM | SYSTOLIC BLOOD PRESSURE: 108 MMHG | OXYGEN SATURATION: 100 % | HEIGHT: 64 IN | DIASTOLIC BLOOD PRESSURE: 78 MMHG

## 2023-04-11 DIAGNOSIS — N92.6 IRREGULAR MENSES: Primary | ICD-10-CM

## 2023-04-11 DIAGNOSIS — N97.9 FEMALE INFERTILITY: ICD-10-CM

## 2023-04-11 PROCEDURE — 99203 OFFICE O/P NEW LOW 30 MIN: CPT | Performed by: OBSTETRICS & GYNECOLOGY

## 2023-04-11 ASSESSMENT — ANXIETY QUESTIONNAIRES
3. WORRYING TOO MUCH ABOUT DIFFERENT THINGS: NOT AT ALL
2. NOT BEING ABLE TO STOP OR CONTROL WORRYING: NOT AT ALL
6. BECOMING EASILY ANNOYED OR IRRITABLE: SEVERAL DAYS
1. FEELING NERVOUS, ANXIOUS, OR ON EDGE: NOT AT ALL
7. FEELING AFRAID AS IF SOMETHING AWFUL MIGHT HAPPEN: NOT AT ALL

## 2023-04-11 ASSESSMENT — PATIENT HEALTH QUESTIONNAIRE - PHQ9
SUM OF ALL RESPONSES TO PHQ QUESTIONS 1-9: 6
5. POOR APPETITE OR OVEREATING: NOT AT ALL

## 2023-04-11 NOTE — PROGRESS NOTES
CC: getting pregnant  HPI: Juan R Uriostegui is a 28 year old female Patient's last menstrual period was 03/15/2023. Menses are regular q 26-28 days.  Has been using a menses tracker and timing intercourse. Had not done OPK yet. Partner is with her today and he states had normal semen analysis done elsewhere.  He has a prior child that is 14 years old and he does smoke marijuana.    The patient has been trying to conceive for 3 yrs. she came off birth control 4 to 5 years ago.         Prior pregnancy history is as follows: Pregnant after 1-2 cycles with a different partner.    Allergies: Patient has no known allergies.                    Past Medical History:   Diagnosis Date     Asthma      Bipolar 1 disorder (H)      Depression      IBS (irritable bowel syndrome)      Obesity        Past Surgical History:   Procedure Laterality Date     [place] LAP,APPENDECTOMY N/A 2/15/2021    Procedure: APPENDECTOMY, LAPAROSCOPIC, Excision of epiploic appendagitis;  Surgeon: Bonnie Kaur MD;  Location: [facility];  Service: General     TONSILLECTOMY             Social History     Tobacco Use     Smoking status: Never     Smokeless tobacco: Never   Vaping Use     Vaping status: Not on file   Substance Use Topics     Alcohol use: No              Family History   Problem Relation Age of Onset     Colon Cancer Mother      Cancer Father      Cervical Cancer Maternal Grandmother        Current Outpatient Medications   Medication Sig Dispense Refill     albuterol (PROAIR HFA/PROVENTIL HFA/VENTOLIN HFA) 108 (90 Base) MCG/ACT inhaler Inhale 2 puffs into the lungs every 6 hours 18 g 1     benzonatate (TESSALON) 200 MG capsule Take 1 capsule (200 mg) by mouth 3 times daily as needed for cough (Patient not taking: Reported on 6/16/2022) 30 capsule 0     celecoxib (CELEBREX) 100 MG capsule Take 1 capsule (100 mg) by mouth 2 times daily 60 capsule 1     celecoxib (CELEBREX) 200 MG capsule Take 1 capsule (200 mg) by mouth daily  "(Patient not taking: Reported on 12/7/2022) 30 capsule 1     citalopram (CELEXA) 20 MG tablet Take 1 tablet (20 mg) by mouth daily 30 tablet 11     clonazePAM (KLONOPIN) 1 MG tablet Take 1 tablet (1 mg) by mouth 2 times daily 60 tablet 0     diclofenac (VOLTAREN) 1 % topical gel Apply 2 g topically 2 times daily (Patient not taking: Reported on 12/7/2022) 350 g 11     diclofenac (VOLTAREN) 75 MG EC tablet Take 1 tablet (75 mg) by mouth 2 times daily (Patient not taking: No sig reported) 60 tablet 1     dicyclomine (BENTYL) 20 MG tablet Take 1 tablet (20 mg) by mouth every 6 hours 100 tablet 11     lidocaine (XYLOCAINE) 5 % ointment [LIDOCAINE (XYLOCAINE) 5 % OINTMENT] Apply to affected area twice daily (Patient not taking: Reported on 6/16/2022) 120 g 4     lithium (ESKALITH) 300 MG tablet Take 600 mg in the morning , 300 mg in the afternoon  And 600 mg at nighttime 150 tablet 11     omeprazole (PRILOSEC) 20 MG DR capsule TAKE 1 CAPSULE(20 MG) BY MOUTH DAILY BEFORE AND BREAKFAST 90 capsule 3     ondansetron (ZOFRAN ODT) 4 MG ODT tab Take 1 tablet (4 mg) by mouth every 8 hours as needed for nausea 20 tablet 0     ondansetron (ZOFRAN) 4 MG tablet Take 1 tablet (4 mg) by mouth every 8 hours as needed (Patient not taking: Reported on 12/7/2022) 16 tablet 1     ondansetron (ZOFRAN) 4 MG tablet Take 1 tablet (4 mg) by mouth every 8 hours as needed for nausea (Patient not taking: Reported on 6/16/2022) 30 tablet 3     pregabalin (LYRICA) 75 MG capsule Take 1 capsule (75 mg) by mouth 3 times daily 90 capsule 2     triamcinolone (KENALOG) 0.025 % external ointment Apply topically 2 times daily (Patient not taking: Reported on 6/16/2022) 80 g 1     Hormonal evaluation has included:   day 3 FSH 5.1  on 3/2023.   TSH 1.24 on 3/2023  Prolacin 9 on 3/2023    EXAM:  Blood pressure 108/78, pulse 86, height 1.626 m (5' 4\"), last menstrual period 03/15/2023, SpO2 100 %, not currently breastfeeding.  BMI= Body mass index is 42.91 " kg/m .  Patient's last menstrual period was 03/15/2023.  General - pleasant female in no acute distress.  Neurological - alert and oriented X 3  Psychiatric - normal mood and affect    prep time day of service 4 min  visit time with the patient 18 min  post visit work including documentation time 5 min  Total time: 27 min    ASSESSMENT/ PLAN:  (N92.6) Irregular menses  (primary encounter diagnosis)  Comment: Since last delivery had both bowel and appendectomy  Plan: XR Hysterosalpingogram, Progesterone        Check HSG for tubal patency since could have been affected by her surgery.  Also will check a progesterone after ovulation to make sure she is actually ovulating.  We will get a release of information for semen analysis from partner to ensure it was normal.    Beata Cisneros MD

## 2023-04-21 ENCOUNTER — HOSPITAL ENCOUNTER (OUTPATIENT)
Dept: RADIOLOGY | Facility: HOSPITAL | Age: 28
Discharge: HOME | End: 2023-04-21
Attending: OBSTETRICS & GYNECOLOGY | Admitting: RADIOLOGY
Payer: COMMERCIAL

## 2023-04-21 DIAGNOSIS — N92.6 IRREGULAR MENSES: ICD-10-CM

## 2023-04-21 PROCEDURE — 255N000002 HC RX 255 OP 636: Performed by: OBSTETRICS & GYNECOLOGY

## 2023-04-21 PROCEDURE — 74740 X-RAY FEMALE GENITAL TRACT: CPT

## 2023-04-21 RX ADMIN — IOHEXOL 30 ML: 300 INJECTION, SOLUTION INTRAVENOUS at 13:53

## 2023-04-26 ENCOUNTER — TELEPHONE (OUTPATIENT)
Dept: FAMILY MEDICINE | Facility: CLINIC | Age: 28
End: 2023-04-26
Payer: COMMERCIAL

## 2023-04-26 NOTE — TELEPHONE ENCOUNTER
Patient Quality Outreach    Patient is due for the following:   Cervical Cancer Screening - PAP Needed    Next Steps:   Patient declined follow up at this time.    Type of outreach:    Phone, spoke to patient/parent. Patient/parent will call back to schedule.      Questions for provider review:    None     Nimisha Crow MA

## 2023-05-04 NOTE — PROGRESS NOTES
Interventional Radiology Clinic Visit    Date of this visit: 2023    Juan R Uriostegui is referred by Dr Beata Cisneros for secondary infertility.    Primary Physician: Dixon Donaldson        History Of Present Illness:    Juan R Uriostegui is a 28 year old  female with secondary infertility.  Her last pregnancy 6 years ago was uneventful, with vaginal delivery. She and her partner have now been trying to conceive for 3 years and has been off birth control for longer than that. Recent hysterosalpingogram shows bilateral tubal obstruction.     Patient has regular menses lasting 5 days about every 26-28 days. She endorses occasional cramping before menstruation. She denies spotting between periods, vaginal discharge, fevers or chills. Recent UTI in 2023. She reports no prior PID. She reports occasional abdominal pain thought to be related to known IBS.    Review of Systems:    As above    Past Medical/Surgical History:    Past Medical History:   Diagnosis Date     Asthma      Bipolar 1 disorder (H)      Depression      IBS (irritable bowel syndrome)      Obesity      Past Surgical History:   Procedure Laterality Date     [place] LAP,APPENDECTOMY N/A 2/15/2021    Procedure: APPENDECTOMY, LAPAROSCOPIC, Excision of epiploic appendagitis;  Surgeon: Bonnie Kaur MD;  Location: [facility];  Service: General     TONSILLECTOMY         Current Medications:    Current Outpatient Medications   Medication Sig Dispense Refill     albuterol (PROAIR HFA/PROVENTIL HFA/VENTOLIN HFA) 108 (90 Base) MCG/ACT inhaler Inhale 2 puffs into the lungs every 6 hours 18 g 1     benzonatate (TESSALON) 200 MG capsule Take 1 capsule (200 mg) by mouth 3 times daily as needed for cough (Patient not taking: Reported on 2022) 30 capsule 0     celecoxib (CELEBREX) 100 MG capsule Take 1 capsule (100 mg) by mouth 2 times daily 60 capsule 1     celecoxib (CELEBREX) 200 MG capsule Take 1 capsule (200 mg) by mouth daily  (Patient not taking: Reported on 12/7/2022) 30 capsule 1     citalopram (CELEXA) 20 MG tablet Take 1 tablet (20 mg) by mouth daily 30 tablet 11     clonazePAM (KLONOPIN) 1 MG tablet Take 1 tablet (1 mg) by mouth 2 times daily 60 tablet 0     diclofenac (VOLTAREN) 1 % topical gel Apply 2 g topically 2 times daily (Patient not taking: Reported on 12/7/2022) 350 g 11     diclofenac (VOLTAREN) 75 MG EC tablet Take 1 tablet (75 mg) by mouth 2 times daily (Patient not taking: No sig reported) 60 tablet 1     dicyclomine (BENTYL) 20 MG tablet Take 1 tablet (20 mg) by mouth every 6 hours 100 tablet 11     lidocaine (XYLOCAINE) 5 % ointment [LIDOCAINE (XYLOCAINE) 5 % OINTMENT] Apply to affected area twice daily (Patient not taking: Reported on 6/16/2022) 120 g 4     lithium (ESKALITH) 300 MG tablet Take 600 mg in the morning , 300 mg in the afternoon  And 600 mg at nighttime 150 tablet 11     omeprazole (PRILOSEC) 20 MG DR capsule TAKE 1 CAPSULE(20 MG) BY MOUTH DAILY BEFORE AND BREAKFAST 90 capsule 3     ondansetron (ZOFRAN ODT) 4 MG ODT tab Take 1 tablet (4 mg) by mouth every 8 hours as needed for nausea 20 tablet 0     ondansetron (ZOFRAN) 4 MG tablet Take 1 tablet (4 mg) by mouth every 8 hours as needed (Patient not taking: Reported on 12/7/2022) 16 tablet 1     ondansetron (ZOFRAN) 4 MG tablet Take 1 tablet (4 mg) by mouth every 8 hours as needed for nausea (Patient not taking: Reported on 6/16/2022) 30 tablet 3     pregabalin (LYRICA) 75 MG capsule Take 1 capsule (75 mg) by mouth 3 times daily 90 capsule 2     triamcinolone (KENALOG) 0.025 % external ointment Apply topically 2 times daily (Patient not taking: Reported on 6/16/2022) 80 g 1       Allergies:    Patient has no known allergies.    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 Non-smoker.    Physical Exam:     LMP 04/17/2023    GENERAL APPEARANCE: healthy, alert and no distress  PSYCHIATRIC: mentation appears normal and affect normal.  NEURO: normal speech and movements  EYES: No jaundice.  SKIN: No jaundice.   RESP: No cough or wheeze.      Laboratory Studies:    Lab Results   Component Value Date    HGB 10.4 01/19/2023     Lab Results   Component Value Date     01/19/2023     Lab Results   Component Value Date    WBC 9.5 01/19/2023       Lab Results   Component Value Date    INR 1.07 11/17/2018       Imaging:     I personally reviewed and interpreted the her hysterosalpingogram from 4/23/2023.  It shows normal shape of the uterus.  No opacification of the fallopian tubes or spillage of contrast into the pelvis.    ASSESSMENT/PLAN:      Juan R Uriostegui is a 28 year old female with secondary infertility. Recent hysterosalpingogram shows bilateral tubal obstruction. Her prior pregnancy and delivery was uneventful. She is an appropriate candidate for fallopian tube recanalization.         I discussed with her how the fallopian tube recanalization is performed, with speculum identification of the cervix and catheter insertion into the endometrial cavity, and that it is performed under conscious sedation.  Once contrast is injected, we will see if her tubes are definitively occluded.  I explained that it is common for patients to have apparent tubal occlusion on HSG but when we placed a larger balloon catheter for the recanalization and inject contrast, the tubes are actually open, either because of tubal spasm at the time of HSG or debris at the isthmus which then clears with contrast injection.  If her tubes are open, we will remove the catheter and no further intervention is necessary.  If one or both sides remain occluded, we will perform recanalization with microcatheter and microwire through the isthmus.    We discussed that the procedure is performed in the  pre-ovulation phase of her menstrual cycle, after her menstrual period has finished.  Then it is important that she try to get pregnant immediately because the chances of pregnancy are higher in the first 3 months, after which the tubes are more likely to re-occlude.  We discussed that the overall pregnancy rate after fallopian tube recanalization is 30 to 50% in the literature.    We discussed the risks of infection, bleeding, and unsuccessful procedure.   Patient was advised to avoid ibuprofen per her GI for her IBS. We will prescribe her doxycycline to take for a few days before and after the procedure, and give one dose of IV Toradol intraprocedurally to help with cramping. We will make ibuprofen available to her after procedure to control possible procedure related pain as needed.    It was a pleasure seeing the patient.     Signed,  Edwar Herrera MD  Interventional Radiology Resident    Co-signed,  Julia Escalona M.D.    Interventional Radiology  Department of Radiology  [facility]  Patient Care Team:  Dixon Donaldson MD as PCP - General (Family Medicine)  Dixon Donaldson MD as Assigned PCP  Beata Cisneros MD as Assigned OBGYN Provider  BEATA CISNEROS, Dr Julia Escalona, was present with the fellow during the entire clinic visit, including the history and exam. I discussed the case with the fellow and agree with the findings as documented in the assessment and plan.         30 minutes spent by me on the date of the encounter doing chart review, history and exam, imaging review, documentation and further activities per the note.

## 2023-05-05 ENCOUNTER — ONCOLOGY VISIT (OUTPATIENT)
Dept: RADIOLOGY | Facility: CLINIC | Age: 28
End: 2023-05-05
Attending: RADIOLOGY
Payer: COMMERCIAL

## 2023-05-05 VITALS
SYSTOLIC BLOOD PRESSURE: 105 MMHG | HEART RATE: 91 BPM | OXYGEN SATURATION: 97 % | TEMPERATURE: 97.8 F | DIASTOLIC BLOOD PRESSURE: 69 MMHG

## 2023-05-05 DIAGNOSIS — N97.9 FEMALE INFERTILITY: ICD-10-CM

## 2023-05-05 PROBLEM — N97.1 OBSTRUCTION OF FALLOPIAN TUBE: Status: ACTIVE | Noted: 2023-05-05

## 2023-05-05 PROCEDURE — 99204 OFFICE O/P NEW MOD 45 MIN: CPT | Mod: GC | Performed by: RADIOLOGY

## 2023-05-05 PROCEDURE — G0463 HOSPITAL OUTPT CLINIC VISIT: HCPCS | Performed by: RADIOLOGY

## 2023-05-05 ASSESSMENT — PAIN SCALES - GENERAL: PAINLEVEL: NO PAIN (0)

## 2023-05-05 NOTE — NURSING NOTE
"Oncology Rooming Note    May 5, 2023 9:13 AM   Juan R Uriostegui is a 28 year old female who presents for:    Chief Complaint   Patient presents with     Oncology Clinic Visit     Fallopian Tube Recanalization      Initial Vitals: /69 (BP Location: Right arm, Patient Position: Sitting, Cuff Size: Adult Large)   Pulse 91   Temp 97.8  F (36.6  C) (Oral)   Ht 1.67 m (5' 5.75\")   LMP 04/28/2023   SpO2 97%   BMI 40.66 kg/m   Estimated body mass index is 40.66 kg/m  as calculated from the following:    Height as of this encounter: 1.67 m (5' 5.75\").    Weight as of 1/19/23: 113.4 kg (250 lb). Body surface area is 2.29 meters squared.  No Pain (0) Comment: Data Unavailable   Patient's last menstrual period was 04/28/2023.  Allergies reviewed: Yes  Medications reviewed: Yes    Medications: Medication refills not needed today.  Pharmacy name entered into [employer]: [id number] - [place], [place] - [address] AT [address]    Clinical concerns: New patient consult.        Kamila Tong              "

## 2023-05-05 NOTE — LETTER
2023         RE: Juan R Uriostegui  [address] [place] [address]        Dear Colleague,    Thank you for referring your patient, Juan R Uriostegui, to the [facility]. Please see a copy of my visit note below.        Interventional Radiology Clinic Visit    Date of this visit: 2023    Juan R Uriostegui is referred by Dr Beata Cisneros for secondary infertility.    Primary Physician: Dixon Donaldson        History Of Present Illness:    Juan R Uriostegui is a 28 year old  female with secondary infertility.  Her last pregnancy 6 years ago was uneventful, with vaginal delivery. She and her partner have now been trying to conceive for 3 years and has been off birth control for longer than that. Recent hysterosalpingogram shows bilateral tubal obstruction.     Patient has regular menses lasting 5 days about every 26-28 days. She endorses occasional cramping before menstruation. She denies spotting between periods, vaginal discharge, fevers or chills. Recent UTI in 2023. She reports no prior PID. She reports occasional abdominal pain thought to be related to known IBS.    Review of Systems:    As above    Past Medical/Surgical History:    Past Medical History:   Diagnosis Date    Asthma     Bipolar 1 disorder (H)     Depression     IBS (irritable bowel syndrome)     Obesity      Past Surgical History:   Procedure Laterality Date    [place] LAP,APPENDECTOMY N/A 2/15/2021    Procedure: APPENDECTOMY, LAPAROSCOPIC, Excision of epiploic appendagitis;  Surgeon: Bonnie Kaur MD;  Location: [facility] [place];  Service: General    TONSILLECTOMY         Current Medications:    Current Outpatient Medications   Medication Sig Dispense Refill    albuterol (PROAIR HFA/PROVENTIL HFA/VENTOLIN HFA) 108 (90 Base) MCG/ACT inhaler Inhale 2 puffs into the lungs every 6 hours 18 g 1    benzonatate (TESSALON) 200 MG capsule Take 1 capsule (200 mg) by mouth 3 times daily as needed  for cough (Patient not taking: Reported on 6/16/2022) 30 capsule 0    celecoxib (CELEBREX) 100 MG capsule Take 1 capsule (100 mg) by mouth 2 times daily 60 capsule 1    celecoxib (CELEBREX) 200 MG capsule Take 1 capsule (200 mg) by mouth daily (Patient not taking: Reported on 12/7/2022) 30 capsule 1    citalopram (CELEXA) 20 MG tablet Take 1 tablet (20 mg) by mouth daily 30 tablet 11    clonazePAM (KLONOPIN) 1 MG tablet Take 1 tablet (1 mg) by mouth 2 times daily 60 tablet 0    diclofenac (VOLTAREN) 1 % topical gel Apply 2 g topically 2 times daily (Patient not taking: Reported on 12/7/2022) 350 g 11    diclofenac (VOLTAREN) 75 MG EC tablet Take 1 tablet (75 mg) by mouth 2 times daily (Patient not taking: No sig reported) 60 tablet 1    dicyclomine (BENTYL) 20 MG tablet Take 1 tablet (20 mg) by mouth every 6 hours 100 tablet 11    lidocaine (XYLOCAINE) 5 % ointment [LIDOCAINE (XYLOCAINE) 5 % OINTMENT] Apply to affected area twice daily (Patient not taking: Reported on 6/16/2022) 120 g 4    lithium (ESKALITH) 300 MG tablet Take 600 mg in the morning , 300 mg in the afternoon  And 600 mg at nighttime 150 tablet 11    omeprazole (PRILOSEC) 20 MG DR capsule TAKE 1 CAPSULE(20 MG) BY MOUTH DAILY BEFORE AND BREAKFAST 90 capsule 3    ondansetron (ZOFRAN ODT) 4 MG ODT tab Take 1 tablet (4 mg) by mouth every 8 hours as needed for nausea 20 tablet 0    ondansetron (ZOFRAN) 4 MG tablet Take 1 tablet (4 mg) by mouth every 8 hours as needed (Patient not taking: Reported on 12/7/2022) 16 tablet 1    ondansetron (ZOFRAN) 4 MG tablet Take 1 tablet (4 mg) by mouth every 8 hours as needed for nausea (Patient not taking: Reported on 6/16/2022) 30 tablet 3    pregabalin (LYRICA) 75 MG capsule Take 1 capsule (75 mg) by mouth 3 times daily 90 capsule 2    triamcinolone (KENALOG) 0.025 % external ointment Apply topically 2 times daily (Patient not taking: Reported on 6/16/2022) 80 g 1       Allergies:    Patient has no known  allergies.    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    Physical Exam:     LMP 04/17/2023    GENERAL APPEARANCE: healthy, alert and no distress  PSYCHIATRIC: mentation appears normal and affect normal.  NEURO: normal speech and movements  EYES: No jaundice.  SKIN: No jaundice.   RESP: No cough or wheeze.      Laboratory Studies:    Lab Results   Component Value Date    HGB 10.4 01/19/2023     Lab Results   Component Value Date     01/19/2023     Lab Results   Component Value Date    WBC 9.5 01/19/2023       Lab Results   Component Value Date    INR 1.07 11/17/2018       Imaging:     I personally reviewed and interpreted the her hysterosalpingogram from 4/23/2023.  It shows normal shape of the uterus.  No opacification of the fallopian tubes or spillage of contrast into the pelvis.    ASSESSMENT/PLAN:      Juan R Uriostegui is a 28 year old female with secondary infertility. Recent hysterosalpingogram shows bilateral tubal obstruction. Her prior pregnancy and delivery was uneventful. She is an appropriate candidate for fallopian tube recanalization.         I discussed with her how the fallopian tube recanalization is performed, with speculum identification of the cervix and catheter insertion into the endometrial cavity, and that it is performed under conscious sedation.  Once contrast is injected, we will see if her tubes are definitively occluded.  I explained that it is common for patients to have apparent tubal occlusion on HSG but when we placed a larger balloon catheter for the recanalization and inject contrast, the tubes are actually open, either because of tubal spasm at the time of HSG or debris at the isthmus which then clears with contrast injection.  If her tubes are open, we will remove the  catheter and no further intervention is necessary.  If one or both sides remain occluded, we will perform recanalization with microcatheter and microwire through the isthmus.    We discussed that the procedure is performed in the pre-ovulation phase of her menstrual cycle, after her menstrual period has finished.  Then it is important that she try to get pregnant immediately because the chances of pregnancy are higher in the first 3 months, after which the tubes are more likely to re-occlude.  We discussed that the overall pregnancy rate after fallopian tube recanalization is 30 to 50% in the literature.    We discussed the risks of infection, bleeding, and unsuccessful procedure.   Patient was advised to avoid ibuprofen per her GI for her IBS. We will prescribe her doxycycline to take for a few days before and after the procedure, and give one dose of IV Toradol intraprocedurally to help with cramping. We will make ibuprofen available to her after procedure to control possible procedure related pain as needed.    It was a pleasure seeing the patient.     Signed,  Edwar Herrera MD  Interventional Radiology Resident    Co-signed,  Julia Escalona M.D.    Interventional Radiology  Department of Radiology  [facility]      CC  Patient Care Team:  Dixon Donaldson MD as PCP - General (Family Medicine)  Dixon Donaldson MD as Assigned PCP  Beata Cisneros MD as Assigned OBGYN Provider  BEATA CISNEROS        I, Dr Julia Escalona, was present with the fellow during the entire clinic visit, including the history and exam. I discussed the case with the fellow and agree with the findings as documented in the assessment and plan.         30 minutes spent by me on the date of the encounter doing chart review, history and exam, imaging review, documentation and further activities per the note.

## 2023-05-08 ENCOUNTER — HEALTH MAINTENANCE LETTER (OUTPATIENT)
Age: 28
End: 2023-05-08

## 2023-05-08 DIAGNOSIS — N97.9 FEMALE INFERTILITY: Primary | ICD-10-CM

## 2023-06-01 ENCOUNTER — TELEPHONE (OUTPATIENT)
Dept: OBGYN | Facility: CLINIC | Age: 28
End: 2023-06-01
Payer: COMMERCIAL

## 2023-06-06 ENCOUNTER — NURSE TRIAGE (OUTPATIENT)
Dept: FAMILY MEDICINE | Facility: CLINIC | Age: 28
End: 2023-06-06

## 2023-06-06 ENCOUNTER — VIRTUAL VISIT (OUTPATIENT)
Dept: FAMILY MEDICINE | Facility: CLINIC | Age: 28
End: 2023-06-06
Payer: COMMERCIAL

## 2023-06-06 ENCOUNTER — TELEPHONE (OUTPATIENT)
Dept: FAMILY MEDICINE | Facility: CLINIC | Age: 28
End: 2023-06-06

## 2023-06-06 DIAGNOSIS — R51.9 SEVERE HEADACHE: Primary | ICD-10-CM

## 2023-06-06 DIAGNOSIS — R11.0 NAUSEA: ICD-10-CM

## 2023-06-06 DIAGNOSIS — M62.81 GENERALIZED MUSCLE WEAKNESS: ICD-10-CM

## 2023-06-06 DIAGNOSIS — R42 DIZZINESS: ICD-10-CM

## 2023-06-06 DIAGNOSIS — H53.9 VISION CHANGES: ICD-10-CM

## 2023-06-06 PROCEDURE — 99441 PR PHYSICIAN TELEPHONE EVALUATION 5-10 MIN: CPT | Mod: 93 | Performed by: FAMILY MEDICINE
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
## 2023-06-06 NOTE — TELEPHONE ENCOUNTER
Patient would like provider to send medication.  RN recommended a virtual or in person visit for treatment recommendation.  Patient agreed to a virtual appointment.    Care Advise: Patient wants to be seen in clinicor virtually today for possible treatment.  Disposition: A telephone virtual appointment scheduled for patient with a provider this afternoon.  Date, time and telephone verified with patient.      GIL Fierro, RN  [facility]      Reason for Disposition    Patient wants to be seen    Additional Information    Negative: Difficult to awaken or acting confused (e.g., disoriented, slurred speech)    Negative: Weakness of the face, arm or leg on one side of the body and new-onset    Negative: Numbness of the face, arm or leg on one side of the body and new-onset    Negative: Loss of speech or garbled speech and new-onset    Negative: Passed out (i.e., fainted, collapsed and was not responding)    Negative: Sounds like a life-threatening emergency to the triager    Negative: Followed a head injury within last 3 days    Negative: Traumatic Brain Injury (TBI) is suspected    Negative: Sinus pain of forehead and yellow or green nasal discharge    Negative: Pregnant    Negative: Unable to walk without falling    Negative: Stiff neck (can't touch chin to chest)    Negative: Possibility of carbon monoxide exposure    Negative: SEVERE headache, states 'worst headache' of life    Negative: SEVERE headache, sudden-onset (i.e., reaching maximum intensity within seconds to 1 hour)    Negative: Severe pain in one eye    Negative: Loss of vision or double vision  (Exception: Same as prior migraines.)    Negative: Patient sounds very sick or weak to the triager    Negative: Fever > 103 F (39.4 C)    Negative: Fever > 100.0 F (37.8 C) and has diabetes mellitus or a weak immune system (e.g., HIV positive, cancer chemotherapy, organ transplant, splenectomy, chronic steroids)    Negative: SEVERE headache  "(e.g., excruciating) and has had severe headaches before    Negative: SEVERE headache and not relieved by pain meds    Negative: SEVERE headache and vomiting    Negative: SEVERE headache and fever    Negative: New headache and weak immune system (e.g., HIV positive, cancer chemo, splenectomy, organ transplant, chronic steroids)    Negative: Fever present > 3 days (72 hours)    Answer Assessment - Initial Assessment Questions  1. LOCATION: \"Where does it hurt?\"       Headache - front of head  2. ONSET: \"When did the headache start?\" (Minutes, hours or days)       Over the weekend.    3. PATTERN: \"Does the pain come and go, or has it been constant since it started?\"      Consistent   4. SEVERITY: \"How bad is the pain?\" and \"What does it keep you from doing?\"  (e.g., Scale 1-10; mild, moderate, or severe)    - MILD (1-3): doesn't interfere with normal activities     - MODERATE (4-7): interferes with normal activities or awakens from sleep     - SEVERE (8-10): excruciating pain, unable to do any normal activities         10/10  5. RECURRENT SYMPTOM: \"Have you ever had headaches before?\" If Yes, ask: \"When was the last time?\" and \"What happened that time?\"       Not until recently.    6. CAUSE: \"What do you think is causing the headache?\"      Have no idea  7. MIGRAINE: \"Have you been diagnosed with migraine headaches?\" If Yes, ask: \"Is this headache similar?\"       No.  Has had similar headache in the past but not this bad.   8. HEAD INJURY: \"Has there been any recent injury to the head?\"       No  9. OTHER SYMPTOMS: \"Do you have any other symptoms?\" (fever, stiff neck, eye pain, sore throat, cold symptoms)      Nasal congestion, eye pressure/pain. Patient also reported having bad sweat.  Took COVID test and was negative.  Feeling shaky.  10. PREGNANCY: \"Is there any chance you are pregnant?\" \"When was your last menstrual period?\"        No. Menstrual period is starting today.    Protocols used: HEADACHE-A-OH      "

## 2023-06-06 NOTE — TELEPHONE ENCOUNTER
Symptoms    Describe your symptoms: congested, and bad headache making pt nauseous, diarrhea, weak,     Any pain: Yes: 10/10    How long have you been having symptoms: since 2 days, but headache -dizzy lightheaded.  Affecting eyes     Have you been seen for this:  No    Appointment offered?: No    Triage offered?: Yes: transferred to RN clinic.  Negative COVID     Home remedies tried: Tylenol    Preferred Pharmacy:   [id number]  [address] [place] [address]  Phone: [phone] Fax: [phone]      Could we send this information to you in [employer] or would you prefer to receive a phone call?:   No preference     Okay to leave a detailed message?: No pt on phone and transferred to clinic RN    Call taken on 6/6/23 at 939 am by TOM Sandoval

## 2023-06-13 ENCOUNTER — TELEPHONE (OUTPATIENT)
Dept: VASCULAR SURGERY | Facility: CLINIC | Age: 28
End: 2023-06-13
Payer: COMMERCIAL

## 2023-06-13 NOTE — TELEPHONE ENCOUNTER
Writer and provider called patient several times, without the option to leave a voicemail to explain that her procedure has been cancelled. [employer] message sent to patient that provided explanation and incuded my direct call back number.     Yaa DEVINE RN, BSN   Interventional Radiology [facility]   [phone]

## 2023-07-05 DIAGNOSIS — Z76.0 ENCOUNTER FOR MEDICATION REFILL: Primary | ICD-10-CM

## 2023-07-05 DIAGNOSIS — F41.0 ANXIETY ATTACK: ICD-10-CM

## 2023-07-05 RX ORDER — CLONAZEPAM 1 MG/1
1 TABLET ORAL 2 TIMES DAILY
Qty: 60 TABLET | Refills: 0 | Status: SHIPPED | OUTPATIENT
Start: 2023-07-05 | End: 2023-09-05

## 2023-08-24 ENCOUNTER — TELEPHONE (OUTPATIENT)
Dept: FAMILY MEDICINE | Facility: CLINIC | Age: 28
End: 2023-08-24

## 2023-08-24 NOTE — TELEPHONE ENCOUNTER
Patient Quality Outreach    Patient is due for the following:   Cervical Cancer Screening - PAP Needed  Physical Preventive Adult Physical    Next Steps:   Schedule a Adult Preventative    Type of outreach:    Phone, left message for patient/parent to call back.    Next Steps:  Reach out within 90 days via Phone.    Max number of attempts reached: No. Will try again in 90 days if patient still on fail list.    Questions for provider review:    None           Reese Rico MA  Chart routed to Care Team.

## 2023-08-25 ENCOUNTER — NURSE TRIAGE (OUTPATIENT)
Dept: FAMILY MEDICINE | Facility: CLINIC | Age: 28
End: 2023-08-25
Payer: COMMERCIAL

## 2023-08-25 NOTE — TELEPHONE ENCOUNTER
"S-(situation): pt called regarding abdominal pain     B-(background): strong back pain now moving to stomach. Symptoms started Monday 8/21/23.  - pt has 2 menstrual cycle last month but did not get any this month    A-(assessment):   - pt stated she was told that she could not get pregnant as there were some blockage in her tubes.  - pt stated there was a sudden onset of pain on Monday and has been constant since then. Changing positions increases pain.  - Pt also stated she has an increase in appetite where she constantly feels hungry and want to eat.  - no vomiting or diarrhea  - no fever  - nothing makes pain better. She's been taking tylenol and pain medications but it is not helping  - pt stated she feels off  - no pain with urination      R-(recommendations):   Per protocol, go to ED or UCC. Pt agrees with recommendation and will go to either ED or UCC at [facility].      Ac Novak Cem Say, BSN TRACY  [facility]        Reason for Disposition   Constant abdominal pain lasting > 2 hours    Additional Information   Negative: Passed out (i.e., fainted, collapsed and was not responding)   Negative: Shock suspected (e.g., cold/pale/clammy skin, too weak to stand, low BP, rapid pulse)   Negative: Sounds like a life-threatening emergency to the triager   Negative: Chest pain   Negative: Pain is mainly in upper abdomen (if needed ask: 'is it mainly above the belly button?')   Negative: Abdominal pain and pregnant < 20 weeks   Negative: Abdominal pain and pregnant 20 or more weeks   Negative: SEVERE abdominal pain (e.g., excruciating)   Negative: Vomiting red blood or black (coffee ground) material   Negative: Bloody, black, or tarry bowel movements  (Exception: Chronic-unchanged black-grey bowel movements and is taking iron pills or Pepto-Bismol.)    Answer Assessment - Initial Assessment Questions  1. LOCATION: \"Where does it hurt?\"       Back and abdominal pain    2. RADIATION: \"Does the pain shoot " "anywhere else?\" (e.g., chest, back)      No. Just abdomen and back    3. ONSET: \"When did the pain begin?\" (Minutes, hours or days ago)       Monday     4. SUDDEN: \"Gradual or sudden onset?\"      Sudden onset    5. PATTERN \"Does the pain come and go, or is it constant?\"     - If constant: \"Is it getting better, staying the same, or worsening?\"       (Note: Constant means the pain never goes away completely; most serious pain is constant and it progresses)      - If intermittent: \"How long does it last?\" \"Do you have pain now?\"      (Note: Intermittent means the pain goes away completely between bouts)      Constant pain but pain intensify depending on position    6. SEVERITY: \"How bad is the pain?\"  (e.g., Scale 1-10; mild, moderate, or severe)     - MILD (1-3): doesn't interfere with normal activities, abdomen soft and not tender to touch      - MODERATE (4-7): interferes with normal activities or awakens from sleep, abdomen tender to touch      - SEVERE (8-10): excruciating pain, doubled over, unable to do any normal activities        10 out of 10    7. RECURRENT SYMPTOM: \"Have you ever had this type of stomach pain before?\" If Yes, ask: \"When was the last time?\" and \"What happened that time?\"       Yes but this feels different    8. CAUSE: \"What do you think is causing the stomach pain?\"      Not sure    9. RELIEVING/AGGRAVATING FACTORS: \"What makes it better or worse?\" (e.g., movement, antacids, bowel movement)      Nothing makes it better    10. OTHER SYMPTOMS: \"Do you have any other symptoms?\" (e.g., back pain, diarrhea, fever, urination pain, vomiting)        Back pain    Answer Assessment - Initial Assessment Questions  1. LOCATION: \"Where does it hurt?\"       Back pain and abdominal pain    2. RADIATION: \"Does the pain shoot anywhere else?\" (e.g., chest, back)      Only back and abdomen     3. ONSET: \"When did the pain begin?\" (e.g., minutes, hours or days ago)       Monday 8/22/23    4. SUDDEN: \"Gradual or " "sudden onset?\"      Sudden    5. PATTERN \"Does the pain come and go, or is it constant?\"     - If constant: \"Is it getting better, staying the same, or worsening?\"       (Note: Constant means the pain never goes away completely; most serious pain is constant and it progresses)      - If intermittent: \"How long does it last?\" \"Do you have pain now?\"      (Note: Intermittent means the pain goes away completely between bouts)      Constant with increase pain depending on position     6. SEVERITY: \"How bad is the pain?\"  (e.g., Scale 1-10; mild, moderate, or severe)    - MILD (1-3): doesn't interfere with normal activities, abdomen soft and not tender to touch     - MODERATE (4-7): interferes with normal activities or awakens from sleep, abdomen tender to touch     - SEVERE (8-10): excruciating pain, doubled over, unable to do any normal activities       10 out of 10    7. RECURRENT SYMPTOM: \"Have you ever had this type of stomach pain before?\" If Yes, ask: \"When was the last time?\" and \"What happened that time?\"       Pt has had stomach pain before but this feels different    8. CAUSE: \"What do you think is causing the stomach pain?\"      Not sure    9. RELIEVING/AGGRAVATING FACTORS: \"What makes it better or worse?\" (e.g., movement, antacids, bowel movement)      Nothing    10. OTHER SYMPTOMS: \"Do you have any other symptoms?\" (e.g., back pain, diarrhea, fever, urination pain, vomiting)        Back pain, fatigued, increase appetite     11. PREGNANCY: \"Is there any chance you are pregnant?\" \"When was your last menstrual period?\"        Last month (pt was told that her tube was blocked and she is unable to get pregnant)    Protocols used: Abdominal Pain - Male-A-OH, Abdominal Pain - Female-A-OH    "

## 2023-08-30 DIAGNOSIS — Z76.0 ENCOUNTER FOR MEDICATION REFILL: ICD-10-CM

## 2023-08-30 DIAGNOSIS — K58.8 OTHER IRRITABLE BOWEL SYNDROME: ICD-10-CM

## 2023-08-30 DIAGNOSIS — R10.84 ABDOMINAL PAIN, GENERALIZED: ICD-10-CM

## 2023-08-30 RX ORDER — DICYCLOMINE HCL 20 MG
20 TABLET ORAL EVERY 6 HOURS
Qty: 100 TABLET | Refills: 11 | Status: SHIPPED | OUTPATIENT
Start: 2023-08-30 | End: 2023-09-20

## 2023-08-30 NOTE — TELEPHONE ENCOUNTER
"Routing refill request to provider for review/approval because:  Routing to provider for review    Last Written Prescription Date:  3/21/2023  Last Fill Quantity: 100,  # refills: 11   Last office visit provider:  6/6/2023     Requested Prescriptions   Pending Prescriptions Disp Refills    dicyclomine (BENTYL) 20 MG tablet 100 tablet 11     Sig: Take 1 tablet (20 mg) by mouth every 6 hours       Oral Anticholinergic Agents Passed - 8/30/2023  2:10 PM        Passed - Patient is of age 12 or older        Passed - Recent (12 mo) or future (30 days) visit with authorizing provider's specialty     Patient has had an office visit with the authorizing provider or a provider within the authorizing providers department within the previous 12 mos or has a future within next 30 days. See \"Patient Info\" tab in inbasket, or \"Choose Columns\" in Meds & Orders section of the refill encounter.              Passed - Medication is active on med list        Passed - Patient is not pregnant        Passed - No positive pregnancy test on file within past 12 months             Pamela Vaca RN 08/30/23 2:13 PM  "

## 2023-08-30 NOTE — TELEPHONE ENCOUNTER
Medication Question or Refill    Contacts         Type Contact Phone/Fax    08/30/2023 08:09 AM CDT Phone (Incoming) Juan R Uriostegui (Self) [phone] (H)            What medication are you calling about (include dose and sig)?: dicyclomine (BENTYL) 20 MG tablet     Preferred Pharmacy:   [id number] - [place], [place] - [address]  [address]  [place] [address]  Phone: [phone] Fax: [phone]      Controlled Substance Agreement on file:   CSA -- Patient Level:    CSA: None found at the patient level.       Who prescribed the medication?: PCP    Do you need a refill? Yes    When did you use the medication last?     Patient offered an appointment? No    Do you have any questions or concerns?  Yes: Pt is not yet due for a refill, however needs one. Pt went into purse to grab medication, noticed medication are no longer pills, but powder. Pt is not sure what happened,, but needs them to prevent her from feeling sick.      Could we send this information to you [facility] or would you prefer to receive a phone call?:   Patient would prefer a phone call   Okay to leave a detailed message?: Yes at Home number on file [phone] (home)

## 2023-09-05 DIAGNOSIS — Z76.0 ENCOUNTER FOR MEDICATION REFILL: ICD-10-CM

## 2023-09-05 DIAGNOSIS — F41.0 ANXIETY ATTACK: ICD-10-CM

## 2023-09-05 RX ORDER — CLONAZEPAM 1 MG/1
1 TABLET ORAL 2 TIMES DAILY
Qty: 60 TABLET | Refills: 0 | Status: SHIPPED | OUTPATIENT
Start: 2023-09-05 | End: 2023-11-24

## 2023-09-20 ENCOUNTER — OFFICE VISIT (OUTPATIENT)
Dept: FAMILY MEDICINE | Facility: CLINIC | Age: 28
End: 2023-09-20
Payer: COMMERCIAL

## 2023-09-20 VITALS
OXYGEN SATURATION: 99 % | TEMPERATURE: 97.8 F | DIASTOLIC BLOOD PRESSURE: 70 MMHG | RESPIRATION RATE: 16 BRPM | HEART RATE: 90 BPM | SYSTOLIC BLOOD PRESSURE: 114 MMHG

## 2023-09-20 DIAGNOSIS — E66.01 MORBID OBESITY (H): ICD-10-CM

## 2023-09-20 DIAGNOSIS — R39.15 URINARY URGENCY: ICD-10-CM

## 2023-09-20 DIAGNOSIS — F31.9 BIPOLAR 1 DISORDER (H): ICD-10-CM

## 2023-09-20 DIAGNOSIS — G43.709 CHRONIC MIGRAINE WITHOUT AURA WITHOUT STATUS MIGRAINOSUS, NOT INTRACTABLE: ICD-10-CM

## 2023-09-20 DIAGNOSIS — R10.84 ABDOMINAL PAIN, GENERALIZED: ICD-10-CM

## 2023-09-20 DIAGNOSIS — R11.0 NAUSEA: ICD-10-CM

## 2023-09-20 DIAGNOSIS — M62.89 PELVIC FLOOR DYSFUNCTION IN FEMALE: ICD-10-CM

## 2023-09-20 DIAGNOSIS — G47.09 OTHER INSOMNIA: ICD-10-CM

## 2023-09-20 DIAGNOSIS — K63.89 EPIPLOIC APPENDAGITIS: ICD-10-CM

## 2023-09-20 DIAGNOSIS — K58.8 OTHER IRRITABLE BOWEL SYNDROME: ICD-10-CM

## 2023-09-20 DIAGNOSIS — Z76.0 ENCOUNTER FOR MEDICATION REFILL: ICD-10-CM

## 2023-09-20 DIAGNOSIS — K58.9 IRRITABLE BOWEL SYNDROME WITHOUT DIARRHEA: Primary | ICD-10-CM

## 2023-09-20 DIAGNOSIS — R19.8 ABDOMINAL BURNING SENSATION IN RIGHT UPPER QUADRANT: ICD-10-CM

## 2023-09-20 DIAGNOSIS — F43.10 PTSD (POST-TRAUMATIC STRESS DISORDER): ICD-10-CM

## 2023-09-20 PROCEDURE — 99215 OFFICE O/P EST HI 40 MIN: CPT | Mod: 25 | Performed by: FAMILY MEDICINE

## 2023-09-20 PROCEDURE — 90471 IMMUNIZATION ADMIN: CPT | Performed by: FAMILY MEDICINE

## 2023-09-20 PROCEDURE — 90686 IIV4 VACC NO PRSV 0.5 ML IM: CPT | Performed by: FAMILY MEDICINE

## 2023-09-20 RX ORDER — TOPIRAMATE 50 MG/1
50 TABLET, FILM COATED ORAL 2 TIMES DAILY
Qty: 180 TABLET | Refills: 1 | Status: SHIPPED | OUTPATIENT
Start: 2023-09-20

## 2023-09-20 RX ORDER — PREGABALIN 75 MG/1
75 CAPSULE ORAL 3 TIMES DAILY
Qty: 90 CAPSULE | Refills: 2 | Status: SHIPPED | OUTPATIENT
Start: 2023-09-20

## 2023-09-20 RX ORDER — ONDANSETRON 4 MG/1
4 TABLET, ORALLY DISINTEGRATING ORAL EVERY 8 HOURS PRN
Qty: 30 TABLET | Refills: 1 | Status: SHIPPED | OUTPATIENT
Start: 2023-09-20

## 2023-09-20 RX ORDER — TOLTERODINE 2 MG/1
2 CAPSULE, EXTENDED RELEASE ORAL DAILY
Qty: 30 CAPSULE | Refills: 11 | Status: SHIPPED | OUTPATIENT
Start: 2023-09-20 | End: 2023-09-21

## 2023-09-20 RX ORDER — LITHIUM CARBONATE 300 MG
TABLET ORAL
Qty: 150 TABLET | Refills: 11 | Status: SHIPPED | OUTPATIENT
Start: 2023-09-20

## 2023-09-20 RX ORDER — TOPIRAMATE 50 MG/1
50 TABLET, FILM COATED ORAL 2 TIMES DAILY
Qty: 60 TABLET | Refills: 3 | Status: SHIPPED | OUTPATIENT
Start: 2023-09-20 | End: 2023-09-20

## 2023-09-20 RX ORDER — DICYCLOMINE HCL 20 MG
40 TABLET ORAL 2 TIMES DAILY
Qty: 100 TABLET | Refills: 11 | Status: SHIPPED | OUTPATIENT
Start: 2023-09-20

## 2023-09-20 ASSESSMENT — ENCOUNTER SYMPTOMS: NERVOUS/ANXIOUS: 1

## 2023-09-20 NOTE — PROGRESS NOTES
Assessment & Plan     Irritable bowel syndrome without diarrhea  Patient has been having symptoms suggestive of bowel syndrome consisting of pain and spasm and occasional obstipation I have modified her dicyclomine dose which she can continue.    Bipolar 1 disorder (H)    Patient recently restarted her lithium.  She needs to see mental health.  She has not been cycling but claims that she needs to keep working in order to keep her mind busy.  I have mentioned to her that working 50 to 60 hours/week is not sustainable as she has severe fatigue thereafter.  - Adult Mental Health  Referral; Future  - lithium (ESKALITH) 300 MG tablet; Take 600 mg in the morning , 300 mg in the afternoon  And 600 mg at nighttime  - pregabalin (LYRICA) 75 MG capsule; Take 1 capsule (75 mg) by mouth 3 times daily    Other insomnia    Occasional sleep difficulties when she is not excessively exhausted I would like her to attempt to sleep at a regular time rather than offering her an agent for sleep she is already taking Klonopin and Lexapro    Chronic migraine without aura without status migrainosus, not intractable    Saw Dr. Hennessy for headaches has frequent headaches and has not been taking ibuprofen which she can use if the headaches are overwhelming I would like to use Topamax to see if we can suppress these headaches which occur 4-5 times per week  - topiramate (TOPAMAX) 50 MG tablet; Take 1 tablet (50 mg) by mouth 2 times daily    Urinary urgency    She is given issues with urinary urgency and occasional incontinence when she coughs or laughs she will frequently void I will refill her Detrol side effects discussed    Abdominal burning sensation in right upper quadrant    Omeprazole refilled side effects of medication discussed she is not to exceed 1 dose daily  - omeprazole (PRILOSEC) 20 MG DR capsule; TAKE 1 CAPSULE(20 MG) BY MOUTH DAILY BEFORE AND BREAKFAST    Encounter for medication refill      - dicyclomine (BENTYL) 20  "MG tablet; Take 2 tablets (40 mg) by mouth 2 times daily  - lithium (ESKALITH) 300 MG tablet; Take 600 mg in the morning , 300 mg in the afternoon  And 600 mg at nighttime    Other irritable bowel syndrome    Currently she is started dicyclomine and she is not having abdominal pain she will take 2 tablets in the morning and 2 tablets in the evening  - dicyclomine (BENTYL) 20 MG tablet; Take 2 tablets (40 mg) by mouth 2 times daily    Abdominal pain, generalized    - dicyclomine (BENTYL) 20 MG tablet; Take 2 tablets (40 mg) by mouth 2 times daily    Pelvic floor dysfunction in female  Lyrica refilled  - pregabalin (LYRICA) 75 MG capsule; Take 1 capsule (75 mg) by mouth 3 times daily    Morbid obesity (H)    We discussed her morbid obesity.  She has been unable to lose weight she is not been able to go to the gym Topamax may suppress her appetite.  - topiramate (TOPAMAX) 50 MG tablet; Take 1 tablet (50 mg) by mouth 2 times daily    PTSD (post-traumatic stress disorder)    The second year anniversary of her grandmother's passing is coming she says she has been having crying spells.  She also feels that this may be disturbing her sleep  - Adult Mental Health  Referral; Future    Epiploic appendagitis    Nausea  Zofran refilled for side effects discussed             BMI:   Estimated body mass index is 42.91 kg/m  as calculated from the following:    Height as of 4/11/23: 1.626 m (5' 4\").    Weight as of 1/19/23: 113.4 kg (250 lb).   Weight management plan discussed adopting an exercise protocol starting Topamax for appetite suppression        Dixon Donaldson MD  [facility] JOSE Pete is a 28 year old, presenting for the following health issues:  Anxiety and Recheck Medication (Change meds and refills)      9/20/2023     2:10 PM   Additional Questions   Roomed by paw p   Accompanied by spouse   History of presenting illness      28-year-old female here for follow-up I have not seen " "her for 6 months.  She states that she has been getting headaches continuously.  She has not been trying to use ibuprofen she says Tylenol does not work.  She saw Dr. Gerhard butler in June for that.  She also has been unable to conceive.  After having multiple lab test done in March 2023 she went to interventional radiology and they noted that her fallopian tubes are obstructed they offered her surgery to see if they could dilate the areas but she is refusing because it is only a 30% success rate she feels sad because she cannot conceive currently.  She reports that she restarted her lithium after being off it for several weeks she states that she needs to see a therapist because she feels like her mood is not stable, she says she is cannot sleep and the only thing that keeps her mind off things: \"Is working she is working about 100 hours every 2 weeks and comes home exhausted.  She is knows that she needs to lose weight and wants to join the gym but she has no energy.  She denies having any anxiety that is heightened she has been taking her clonazepam and Lexapro.  She states that the headaches when they occur occur about 4-5 times a week and are present on both sides of her scalp they are not associated with any visual complaints.  She says she has had to modify the dicyclomine doses for her irritable bowel syndrome and take 2 tablets twice a day which seems to work well.  She says that she needs a refill of her omeprazole because she does get heartburn and she states that she needs a prescription of Zofran because frequently she feels nauseated especially at work.    She has noted over the last 6 weeks that when she coughs or laughs she sometimes will have leakage of urine which was occurring previously and wants to be restarted on her bladder medication.    Anxiety    History of Present Illness       Reason for visit:  Bladder and mental health    She eats 0-1 servings of fruits and vegetables daily.She " consumes 2 sweetened beverage(s) daily.She exercises with enough effort to increase her heart rate 60 or more minutes per day.  She exercises with enough effort to increase her heart rate 5 days per week.   She is taking medications regularly.                 Review of Systems   Psychiatric/Behavioral:  The patient is nervous/anxious.       Constitutional, HEENT, cardiovascular, pulmonary, GI, , musculoskeletal, neuro, skin, endocrine and psych systems are negative, except as otherwise noted.      Objective    /70   Pulse 90   Temp 97.8  F (36.6  C) (Oral)   Resp 16   LMP 09/06/2023 (Approximate)   SpO2 99%   There is no height or weight on file to calculate BMI.  Physical Exam   GENERAL: healthy, alert and no distress  EYES: Eyes grossly normal to inspection, PERRL and conjunctivae and sclerae normal  NECK: no adenopathy, no asymmetry, masses, or scars and thyroid normal to palpation  RESP: lungs clear to auscultation - no rales, rhonchi or wheezes  CV: regular rate and rhythm, normal S1 S2, no S3 or S4, no murmur, click or rub, no peripheral edema and peripheral pulses strong  ABDOMEN: soft, nontender, no hepatosplenomegaly, no masses and bowel sounds normal  MS: no gross musculoskeletal defects noted, no edema  SKIN: no suspicious lesions or rashes  NEURO: Normal strength and tone, mentation intact and speech normal  PSYCH: mentation appears normal, affect normal/bright            Total amount of time spent in coordination of care today with the patient was 40 minutes

## 2023-09-21 RX ORDER — TOLTERODINE 2 MG/1
CAPSULE, EXTENDED RELEASE ORAL
Qty: 90 CAPSULE | Refills: 3 | Status: SHIPPED | OUTPATIENT
Start: 2023-09-21

## 2023-09-21 NOTE — TELEPHONE ENCOUNTER
One year supply ordered by Dr. Donaldson on 9/20/23.    Dispense adjusted to 90 day supply.    ABHISHEK DailyN, RN-[facility]

## 2023-09-27 ENCOUNTER — VIRTUAL VISIT (OUTPATIENT)
Dept: PSYCHOLOGY | Facility: CLINIC | Age: 28
End: 2023-09-27
Payer: COMMERCIAL

## 2023-09-27 DIAGNOSIS — F31.9 BIPOLAR 1 DISORDER (H): Primary | ICD-10-CM

## 2023-09-27 DIAGNOSIS — F41.1 GAD (GENERALIZED ANXIETY DISORDER): ICD-10-CM

## 2023-09-27 DIAGNOSIS — F43.10 PTSD (POST-TRAUMATIC STRESS DISORDER): ICD-10-CM

## 2023-09-27 PROCEDURE — 90791 PSYCH DIAGNOSTIC EVALUATION: CPT | Mod: 95 | Performed by: COUNSELOR

## 2023-09-27 ASSESSMENT — ANXIETY QUESTIONNAIRES
2. NOT BEING ABLE TO STOP OR CONTROL WORRYING: NOT AT ALL
IF YOU CHECKED OFF ANY PROBLEMS ON THIS QUESTIONNAIRE, HOW DIFFICULT HAVE THESE PROBLEMS MADE IT FOR YOU TO DO YOUR WORK, TAKE CARE OF THINGS AT HOME, OR GET ALONG WITH OTHER PEOPLE: NOT DIFFICULT AT ALL
GAD7 TOTAL SCORE: 4
GAD7 TOTAL SCORE: 4
3. WORRYING TOO MUCH ABOUT DIFFERENT THINGS: NOT AT ALL
1. FEELING NERVOUS, ANXIOUS, OR ON EDGE: NEARLY EVERY DAY
5. BEING SO RESTLESS THAT IT IS HARD TO SIT STILL: NOT AT ALL
4. TROUBLE RELAXING: NOT AT ALL
7. FEELING AFRAID AS IF SOMETHING AWFUL MIGHT HAPPEN: SEVERAL DAYS
6. BECOMING EASILY ANNOYED OR IRRITABLE: NOT AT ALL

## 2023-09-27 ASSESSMENT — COLUMBIA-SUICIDE SEVERITY RATING SCALE - C-SSRS
6. HAVE YOU EVER DONE ANYTHING, STARTED TO DO ANYTHING, OR PREPARED TO DO ANYTHING TO END YOUR LIFE?: NO
ATTEMPT PAST THREE MONTHS: NO
1. HAVE YOU WISHED YOU WERE DEAD OR WISHED YOU COULD GO TO SLEEP AND NOT WAKE UP?: YES
1. IN THE PAST MONTH, HAVE YOU WISHED YOU WERE DEAD OR WISHED YOU COULD GO TO SLEEP AND NOT WAKE UP?: NO
TOTAL  NUMBER OF ABORTED OR SELF INTERRUPTED ATTEMPTS LIFETIME: NO
TOTAL  NUMBER OF ACTUAL ATTEMPTS LIFETIME: 1
LETHALITY/MEDICAL DAMAGE CODE MOST LETHAL POTENTIAL ATTEMPT: BEHAVIOR NOT LIKELY TO RESULT IN INJURY
ATTEMPT LIFETIME: YES
LETHALITY/MEDICAL DAMAGE CODE MOST LETHAL ACTUAL ATTEMPT: NO PHYSICAL DAMAGE OR VERY MINOR PHYSICAL DAMAGE
TOTAL  NUMBER OF INTERRUPTED ATTEMPTS LIFETIME: NO
2. HAVE YOU ACTUALLY HAD ANY THOUGHTS OF KILLING YOURSELF?: NO

## 2023-09-27 ASSESSMENT — PATIENT HEALTH QUESTIONNAIRE - PHQ9
10. IF YOU CHECKED OFF ANY PROBLEMS, HOW DIFFICULT HAVE THESE PROBLEMS MADE IT FOR YOU TO DO YOUR WORK, TAKE CARE OF THINGS AT HOME, OR GET ALONG WITH OTHER PEOPLE: SOMEWHAT DIFFICULT
SUM OF ALL RESPONSES TO PHQ QUESTIONS 1-9: 8
SUM OF ALL RESPONSES TO PHQ QUESTIONS 1-9: 8
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
## 2023-10-05 ENCOUNTER — VIRTUAL VISIT (OUTPATIENT)
Dept: PSYCHOLOGY | Facility: CLINIC | Age: 28
End: 2023-10-05
Payer: COMMERCIAL

## 2023-10-05 DIAGNOSIS — F41.1 GAD (GENERALIZED ANXIETY DISORDER): Primary | ICD-10-CM

## 2023-10-05 DIAGNOSIS — F31.9 BIPOLAR 1 DISORDER (H): ICD-10-CM

## 2023-10-05 DIAGNOSIS — F43.10 PTSD (POST-TRAUMATIC STRESS DISORDER): ICD-10-CM

## 2023-10-05 PROCEDURE — 90834 PSYTX W PT 45 MINUTES: CPT | Mod: VID | Performed by: COUNSELOR

## 2023-10-05 ASSESSMENT — PATIENT HEALTH QUESTIONNAIRE - PHQ9
SUM OF ALL RESPONSES TO PHQ QUESTIONS 1-9: 7
SUM OF ALL RESPONSES TO PHQ QUESTIONS 1-9: 7
10. IF YOU CHECKED OFF ANY PROBLEMS, HOW DIFFICULT HAVE THESE PROBLEMS MADE IT FOR YOU TO DO YOUR WORK, TAKE CARE OF THINGS AT HOME, OR GET ALONG WITH OTHER PEOPLE: NOT DIFFICULT AT ALL

## 2023-10-05 NOTE — PROGRESS NOTES
[facility]                                     Progress Note    Patient Name: Juan R Uriostegui  Date: 10/5/23         Service Type: Individual      Session Start Time: 500 pm  Session End Time: 545 pm     Session Length: 45 minutes    Session #: 1    Attendees: Client    Service Modality:  Video Visit:      Provider verified identity through the following two step process.  Patient provided:  Patient  and Patient address    Telemedicine Visit: The patient's condition can be safely assessed and treated via synchronous audio and visual telemedicine encounter.      Reason for Telemedicine Visit: Services only offered telehealth    Originating Site (Patient Location): Patient's home    Distant Site (Provider Location): Provider Remote Setting- Home Office    Consent:  The patient/guardian has verbally consented to: the potential risks and benefits of telemedicine (video visit) versus in person care; bill my insurance or make self-payment for services provided; and responsibility for payment of non-covered services.     Patient would like the video invitation sent by:  My Chart    Mode of Communication:  Video Conference via Amwell    Distant Location (Provider):  Off-site    As the provider I attest to compliance with applicable laws and regulations related to telemedicine.    DATA  Interactive Complexity: No  Crisis: No        Progress Since Last Session (Related to Symptoms / Goals / Homework):   Symptoms: No change      Homework: Completed in session      Episode of Care Goals:  NA     Current / Ongoing Stressors and Concerns:   Grief and loss, relationship conflict, employment stress     Treatment Objective(s) Addressed in This Session:   Increase interest, engagement, and pleasure in doing things       Intervention:   Solution Focused: Created ways to incorporate joyful activities prior to next session.    Assessments completed prior to visit:  The following assessments were completed by  patient for this visit:  PHQ9:       2/25/2021     8:53 AM 12/30/2021     6:20 PM 6/16/2022     5:06 PM 6/16/2022     5:29 PM 4/11/2023    10:27 AM 9/27/2023    12:41 PM 10/5/2023     4:41 PM   PHQ-9 SCORE   PHQ-9 Total Score MyChart   8 (Mild depression)   8 (Mild depression) 7 (Mild depression)   PHQ-9 Total Score 7 11 8 8 6 8 7     GAD7:       6/16/2022     5:08 PM 6/16/2022     5:29 PM 9/27/2023     1:06 PM   ISELA-7 SCORE   Total Score 19 (severe anxiety)  4 (minimal anxiety)   Total Score 19 19 4         ASSESSMENT: Current Emotional / Mental Status (status of significant symptoms):   Risk status (Self / Other harm or suicidal ideation)   Patient denies current fears or concerns for personal safety.   Patient denies current or recent suicidal ideation or behaviors.   Patient denies current or recent homicidal ideation or behaviors.   Patient denies current or recent self injurious behavior or ideation.   Patient denies other safety concerns.   Patient reports there has been no change in risk factors since their last session.     Patient reports there has been no change in protective factors since their last session.     A safety and risk management plan has been developed including: Patient consented to co-developed safety plan on 9/27/23.  Safety and risk management plan was reviewed.   Patient agreed to use safety plan should any safety concerns arise.  A copy was made available to the patient.     Appearance:   Appropriate    Eye Contact:   Good    Psychomotor Behavior: Normal    Attitude:   Cooperative  Interested   Orientation:   All   Speech    Rate / Production: Normal/ Responsive Normal     Volume:  Normal    Mood:    Normal   Affect:    Appropriate    Thought Content:  Clear    Thought Form:  Coherent  Logical    Insight:    Good      Medication Review:   No changes to current psychiatric medication(s)     Medication Compliance:   Yes     Changes in Health Issues:   None reported     Chemical Use  Review:   Substance Use: Chemical use reviewed, no active concerns identified      Tobacco Use: No current tobacco use.      Diagnosis:  1. ISELA (generalized anxiety disorder)    2. Bipolar 1 disorder (H)    3. PTSD (post-traumatic stress disorder)        Collateral Reports Completed:   Not Applicable    PLAN: (Patient Tasks / Therapist Tasks / Other)  Patient to find ways to engage in self care through the day to aid in mood regulation.  Patient to engage in  activities they find enjoyable this weekend.        Babita Rey, [facility]                                                         ______________________________________________________________________    Individual Treatment Plan    Patient's Name: Juan R Uriostegui  YOB: 1995    Date of Creation: 10/5/23  Date Treatment Plan Last Reviewed/Revised: NA    DSM5 Diagnoses: 296.50 Bipolar I Disorder Current or Most Recent Episode Depressed, unspecified or 300.02 (F41.1) Generalized Anxiety Disorder  Psychosocial / Contextual Factors: Relationship conflict, grief related to loss of grandmother.  PROMIS (reviewed every 90 days):     Referral / Collaboration:  Referral to another professional/service is not indicated at this time..    Anticipated number of session for this episode of care: 9-12 sessions  Anticipation frequency of session: Weekly  Anticipated Duration of each session: 38-52 minutes  Treatment plan will be reviewed in 90 days or when goals have been changed.       MeasurableTreatment Goal(s) related to diagnosis / functional impairment(s)  Goal 1: Patient will develop and implement coping strategies related to depression symptoms.    I will know I've met my goal when I feel more regulated.      Objective #A (Patient Action)    Patient will Feel less tired and more energy during the day .  Status: New - Date: 10/5/23      Intervention(s)  Therapist will teach  opposite to emotion action skill .    Objective #B  Patient will Increase  interest, engagement, and pleasure in doing things.  Status: New - Date: 10/5/23      Intervention(s)  Therapist will teach emotional regulation skills.   .    Objective #C  Patient will Decrease frequency and intensity of feeling down, depressed, hopeless.  Status: New - Date: 10/5/23      Intervention(s)  Therapist will teach emotional regulation skills. Related to improving mood .    Patient has reviewed and agreed to the above plan.      DAKOTA Hicks  October 5, 2023

## 2023-11-06 ENCOUNTER — TELEPHONE (OUTPATIENT)
Dept: FAMILY MEDICINE | Facility: CLINIC | Age: 28
End: 2023-11-06
Payer: COMMERCIAL

## 2023-11-06 NOTE — TELEPHONE ENCOUNTER
Patient Quality Outreach    Patient is due for the following:   Cervical Cancer Screening - PAP Needed  Physical Preventive Adult Physical    Next Steps:   Schedule a Adult Preventative    Type of outreach:    Phone, left message for patient/parent to call back.      Questions for provider review:    None           Judy Hernandez MA

## 2024-02-29 ENCOUNTER — TELEPHONE (OUTPATIENT)
Dept: FAMILY MEDICINE | Facility: CLINIC | Age: 29
End: 2024-02-29

## 2024-02-29 NOTE — TELEPHONE ENCOUNTER
Patient Quality Outreach    Patient is due for the following:   Cervical Cancer Screening - PAP Needed  Physical Preventive Adult Physical    Next Steps:   Patient declined follow up at this time.    Type of outreach:    Phone, spoke to patient/parent. Patient/parent will call back to schedule.    Next Steps:  Reach out within 90 days via Phone.    Max number of attempts reached: Yes. Will try again in 90 days if patient still on fail list.    Questions for provider review:    None           Reese Rico MA  Chart routed to Care Team.

## 2024-07-14 ENCOUNTER — HEALTH MAINTENANCE LETTER (OUTPATIENT)
Age: 29
End: 2024-07-14

## 2024-08-08 ENCOUNTER — HOSPITAL ENCOUNTER (EMERGENCY)
Facility: HOSPITAL | Age: 29
Discharge: HOME | End: 2024-08-09
Attending: STUDENT IN AN ORGANIZED HEALTH CARE EDUCATION/TRAINING PROGRAM | Admitting: STUDENT IN AN ORGANIZED HEALTH CARE EDUCATION/TRAINING PROGRAM

## 2024-08-08 ENCOUNTER — APPOINTMENT (OUTPATIENT)
Dept: CT IMAGING | Facility: HOSPITAL | Age: 29
End: 2024-08-08
Attending: STUDENT IN AN ORGANIZED HEALTH CARE EDUCATION/TRAINING PROGRAM

## 2024-08-08 DIAGNOSIS — K62.5 BRBPR (BRIGHT RED BLOOD PER RECTUM): ICD-10-CM

## 2024-08-08 DIAGNOSIS — K64.4 EXTERNAL HEMORRHOIDS: ICD-10-CM

## 2024-08-08 DIAGNOSIS — R10.32 LLQ ABDOMINAL PAIN: ICD-10-CM

## 2024-08-08 LAB
ALBUMIN SERPL BCG-MCNC: 3.9 G/DL (ref 3.5–5.2)
ALP SERPL-CCNC: 95 U/L (ref 40–150)
ALT SERPL W P-5'-P-CCNC: 20 U/L (ref 0–50)
ANION GAP SERPL CALCULATED.3IONS-SCNC: 9 MMOL/L (ref 7–15)
AST SERPL W P-5'-P-CCNC: 18 U/L (ref 0–45)
BASOPHILS # BLD AUTO: 0 10E3/UL (ref 0–0.2)
BASOPHILS NFR BLD AUTO: 0 %
BILIRUB DIRECT SERPL-MCNC: <0.2 MG/DL (ref 0–0.3)
BILIRUB SERPL-MCNC: 0.2 MG/DL
BUN SERPL-MCNC: 11.5 MG/DL (ref 6–20)
CALCIUM SERPL-MCNC: 8.7 MG/DL (ref 8.8–10.4)
CHLORIDE SERPL-SCNC: 105 MMOL/L (ref 98–107)
CREAT SERPL-MCNC: 0.89 MG/DL (ref 0.51–0.95)
EGFRCR SERPLBLD CKD-EPI 2021: 90 ML/MIN/1.73M2
EOSINOPHIL # BLD AUTO: 0.1 10E3/UL (ref 0–0.7)
EOSINOPHIL NFR BLD AUTO: 1 %
ERYTHROCYTE [DISTWIDTH] IN BLOOD BY AUTOMATED COUNT: 18.1 % (ref 10–15)
GLUCOSE SERPL-MCNC: 94 MG/DL (ref 70–99)
HCG SERPL QL: NEGATIVE
HCO3 SERPL-SCNC: 26 MMOL/L (ref 22–29)
HCT VFR BLD AUTO: 33.8 % (ref 35–47)
HGB BLD-MCNC: 10.3 G/DL (ref 11.7–15.7)
IMM GRANULOCYTES # BLD: 0 10E3/UL
IMM GRANULOCYTES NFR BLD: 0 %
LIPASE SERPL-CCNC: 18 U/L (ref 13–60)
LYMPHOCYTES # BLD AUTO: 3.6 10E3/UL (ref 0.8–5.3)
LYMPHOCYTES NFR BLD AUTO: 37 %
MCH RBC QN AUTO: 23 PG (ref 26.5–33)
MCHC RBC AUTO-ENTMCNC: 30.5 G/DL (ref 31.5–36.5)
MCV RBC AUTO: 75 FL (ref 78–100)
MONOCYTES # BLD AUTO: 0.5 10E3/UL (ref 0–1.3)
MONOCYTES NFR BLD AUTO: 5 %
NEUTROPHILS # BLD AUTO: 5.6 10E3/UL (ref 1.6–8.3)
NEUTROPHILS NFR BLD AUTO: 57 %
NRBC # BLD AUTO: 0 10E3/UL
NRBC BLD AUTO-RTO: 0 /100
PLATELET # BLD AUTO: 389 10E3/UL (ref 150–450)
POTASSIUM SERPL-SCNC: 3.7 MMOL/L (ref 3.4–5.3)
PROT SERPL-MCNC: 7 G/DL (ref 6.4–8.3)
RBC # BLD AUTO: 4.48 10E6/UL (ref 3.8–5.2)
SODIUM SERPL-SCNC: 140 MMOL/L (ref 135–145)
WBC # BLD AUTO: 9.8 10E3/UL (ref 4–11)

## 2024-08-08 PROCEDURE — 80053 COMPREHEN METABOLIC PANEL: CPT | Performed by: STUDENT IN AN ORGANIZED HEALTH CARE EDUCATION/TRAINING PROGRAM

## 2024-08-08 PROCEDURE — 84703 CHORIONIC GONADOTROPIN ASSAY: CPT | Performed by: STUDENT IN AN ORGANIZED HEALTH CARE EDUCATION/TRAINING PROGRAM

## 2024-08-08 PROCEDURE — 250N000011 HC RX IP 250 OP 636: Performed by: STUDENT IN AN ORGANIZED HEALTH CARE EDUCATION/TRAINING PROGRAM

## 2024-08-08 PROCEDURE — 36415 COLL VENOUS BLD VENIPUNCTURE: CPT | Performed by: STUDENT IN AN ORGANIZED HEALTH CARE EDUCATION/TRAINING PROGRAM

## 2024-08-08 PROCEDURE — 82248 BILIRUBIN DIRECT: CPT | Performed by: STUDENT IN AN ORGANIZED HEALTH CARE EDUCATION/TRAINING PROGRAM

## 2024-08-08 PROCEDURE — 74174 CTA ABD&PLVS W/CONTRAST: CPT

## 2024-08-08 PROCEDURE — 85025 COMPLETE CBC W/AUTO DIFF WBC: CPT | Performed by: STUDENT IN AN ORGANIZED HEALTH CARE EDUCATION/TRAINING PROGRAM

## 2024-08-08 PROCEDURE — 83690 ASSAY OF LIPASE: CPT | Performed by: STUDENT IN AN ORGANIZED HEALTH CARE EDUCATION/TRAINING PROGRAM

## 2024-08-08 PROCEDURE — 250N000013 HC RX MED GY IP 250 OP 250 PS 637: Performed by: STUDENT IN AN ORGANIZED HEALTH CARE EDUCATION/TRAINING PROGRAM

## 2024-08-08 PROCEDURE — 99285 EMERGENCY DEPT VISIT HI MDM: CPT | Mod: 25

## 2024-08-08 RX ORDER — ACETAMINOPHEN 325 MG/1
975 TABLET ORAL ONCE
Status: COMPLETED | OUTPATIENT
Start: 2024-08-08 | End: 2024-08-08

## 2024-08-08 RX ORDER — DICYCLOMINE HCL 20 MG
20 TABLET ORAL ONCE
Status: COMPLETED | OUTPATIENT
Start: 2024-08-08 | End: 2024-08-08

## 2024-08-08 RX ORDER — IOPAMIDOL 755 MG/ML
90 INJECTION, SOLUTION INTRAVASCULAR ONCE
Status: COMPLETED | OUTPATIENT
Start: 2024-08-08 | End: 2024-08-08

## 2024-08-08 RX ORDER — KETOROLAC TROMETHAMINE 15 MG/ML
15 INJECTION, SOLUTION INTRAMUSCULAR; INTRAVENOUS ONCE
Status: DISCONTINUED | OUTPATIENT
Start: 2024-08-08 | End: 2024-08-08

## 2024-08-08 RX ADMIN — ACETAMINOPHEN 975 MG: 325 TABLET ORAL at 22:27

## 2024-08-08 RX ADMIN — DICYCLOMINE HYDROCHLORIDE 20 MG: 20 TABLET ORAL at 22:27

## 2024-08-08 RX ADMIN — IOPAMIDOL 90 ML: 755 INJECTION, SOLUTION INTRAVENOUS at 23:19

## 2024-08-08 ASSESSMENT — ACTIVITIES OF DAILY LIVING (ADL)
ADLS_ACUITY_SCORE: 35
ADLS_ACUITY_SCORE: 33

## 2024-08-08 ASSESSMENT — COLUMBIA-SUICIDE SEVERITY RATING SCALE - C-SSRS
1. IN THE PAST MONTH, HAVE YOU WISHED YOU WERE DEAD OR WISHED YOU COULD GO TO SLEEP AND NOT WAKE UP?: NO
6. HAVE YOU EVER DONE ANYTHING, STARTED TO DO ANYTHING, OR PREPARED TO DO ANYTHING TO END YOUR LIFE?: NO
2. HAVE YOU ACTUALLY HAD ANY THOUGHTS OF KILLING YOURSELF IN THE PAST MONTH?: NO

## 2024-08-08 NOTE — Clinical Note
Juan R Uriostegui was seen and treated in our emergency department on 8/8/2024.  She may return to work on 08/10/2024.       If you have any questions or concerns, please don't hesitate to call.      Kishore York MD

## 2024-08-09 VITALS
OXYGEN SATURATION: 94 % | TEMPERATURE: 97.7 F | HEART RATE: 75 BPM | BODY MASS INDEX: 47.24 KG/M2 | SYSTOLIC BLOOD PRESSURE: 100 MMHG | WEIGHT: 275.2 LBS | DIASTOLIC BLOOD PRESSURE: 51 MMHG | RESPIRATION RATE: 18 BRPM

## 2024-08-09 NOTE — ED PROVIDER NOTES
EMERGENCY DEPARTMENT ENCOUNTER      NAME: Juan R Uriostegui  AGE: 29 year old female  YOB: 1995  MRN: [id number]  EVALUATION DATE & TIME: 8/8/2024  9:44 PM    PCP: No primary care provider on file.    ED PROVIDER: Kishore York MD      Chief Complaint   Patient presents with    Rectal Bleeding         FINAL IMPRESSION:  1. BRBPR (bright red blood per rectum)    2. LLQ abdominal pain    3. External hemorrhoids          ED COURSE & MEDICAL DECISION MAKING:    Pertinent Labs & Imaging studies reviewed. (See chart for details)  29 year old female presents to the Emergency Department for evaluation of rectal bleeding    ED Course as of 08/09/24 0017   Thu Aug 08, 2024   2204 Patient is a 29-year-old female with a past medical history significant for obesity, IBS, who presents the emergency department with several hours of bright red blood per rectum, left lower quadrant abdominal pain, with tenderness in this area.  No fevers or abnormalities in vital signs.  No evidence of peritonitis.  On chaperoned rectal exam she has an external hemorrhoid that is not thrombosed, not actively bleeding.  No fissures.  No abscess.  This may be the cause of her bleeding, but does not explain her left lower quadrant abdominal pain and tenderness.  Differential includes diverticulitis, AVM, colitis.   2309 Pregnancy test negative.  No transaminitis or pancreatitis.  No electrolyte abnormalities or kidney injury.   2310 No leukocytosis.  Baseline anemia from 1 year ago.   Fri Aug 09, 2024   0002 CTA is reassuring, does not show any active bleed in the abdomen.  Will discharge at this time with return precautions and instructions to follow-up with her PCP.       9:57 PM I met with the patient, obtained history, performed an initial exam, and discussed options and plan for diagnostics and treatment here in the ED.     Medical Decision Making  Obtained supplemental history:Supplemental history obtained?: No  Reviewed  external records: External records reviewed?: Documented in chart  Care impacted by chronic illness:N/A  Care significantly affected by social determinants of health:N/A  Did you consider but not order tests?: Work up considered but not performed and documented in chart, if applicable  Did you interpret images independently?: Independent interpretation of ECG and images noted in documentation, when applicable.  Consultation discussion with other provider:Did you involve another provider (consultant, , pharmacy, etc.)?: No  Discharge. No recommendations on prescription strength medication(s). See documentation for any additional details.        At the conclusion of the encounter I discussed the results of all of the tests and the disposition. The questions were answered. The patient or family acknowledged understanding and was agreeable with the care plan.     0 minutes of critical care time     MEDICATIONS GIVEN IN THE EMERGENCY:  Medications   acetaminophen (TYLENOL) tablet 975 mg (975 mg Oral $Given 8/8/24 2227)   dicyclomine (BENTYL) tablet 20 mg (20 mg Oral $Given 8/8/24 2227)   iopamidol (ISOVUE-370) solution 90 mL (90 mLs Intravenous $Given 8/8/24 2319)       NEW PRESCRIPTIONS STARTED AT TODAY'S ER VISIT  New Prescriptions    No medications on file          =================================================================    HPI    Patient information was obtained from: patient    Use of : N/A    Juan R DENG Moody is a 29 year old female with a pertinent history of irritable bowel syndrome who presents to this ED by walking for evaluation of rectal bleeding.    Per patient, for the last few hours she has been experiencing rectal bleeding. Along side her rectal bleeding she is also experiencing some left sided abdominal cramping. She first noticed the bleeding when she passed stool earlier today (08/08/2024) and she noticed some blood present.    She has had no diarrhea. It was noted that around  3-4 years ago she had surgery on her sigmoid colon and she said this cramping sensation felt similar. She mentioned taking nothing for her symptoms today. No daily medications were mentioned.    Chart review:  Per Chart Review, the patient was seen on 09/20/2023 at [facility] for her anxiety and medications. At this visit her irritable bowel syndrome without diarrhea was addressed. Her IBS consisted of pain and spasms. At this visit her dicyclomine dose was modified. Also it was brought up that she gets abdominal burning in her right upper quadrant and her omeprazole 20 mg once daily tablets were refilled to address that. Also she had been unable to loose weight and Topamax 50 mg twice daily tablets were prescribed to try to potentially suppress her appetite. She addressed some urinary issues and thus her Lyrica was refilled.     PAST MEDICAL HISTORY:  Past Medical History:   Diagnosis Date    Asthma     Bipolar 1 disorder (H)     Depression     IBS (irritable bowel syndrome)     Obesity        PAST SURGICAL HISTORY:  Past Surgical History:   Procedure Laterality Date    LA LAP,APPENDECTOMY N/A 2/15/2021    Procedure: APPENDECTOMY, LAPAROSCOPIC, Excision of epiploic appendagitis;  Surgeon: Bonnie Kaur MD;  [place] [facility] [place]  Service: General    TONSILLECTOMY             CURRENT MEDICATIONS:    citalopram (CELEXA) 20 MG tablet  clonazePAM (KLONOPIN) 1 MG tablet  dicyclomine (BENTYL) 20 MG tablet  lithium (ESKALITH) 300 MG tablet  omeprazole (PRILOSEC) 20 MG DR capsule  ondansetron (ZOFRAN ODT) 4 MG ODT tab  ondansetron (ZOFRAN ODT) 4 MG ODT tab  pregabalin (LYRICA) 75 MG capsule  tolterodine ER (DETROL LA) 2 MG 24 hr capsule  topiramate (TOPAMAX) 50 MG tablet        ALLERGIES:  No Known Allergies    FAMILY HISTORY:  Family History   Problem Relation Age of Onset    Colon Cancer Mother     Cancer Father     Cervical Cancer Maternal Grandmother        SOCIAL HISTORY:    Social History     Socioeconomic History    Marital status: Single   Tobacco Use    Smoking status: Never     Passive exposure: Never    Smokeless tobacco: Never   Vaping Use    Vaping status: Never Used   Substance and Sexual Activity    Alcohol use: No    Drug use: Never    Sexual activity: Yes     Partners: Male   Social History Narrative    Works at a nursing home.  Has 1 3-year-old son.     Social Determinants of Health     Financial Resource Strain: Low Risk  (9/20/2023)    Financial Resource Strain     Within the past 12 months, have you or your family members you live with been unable to get utilities (heat, electricity) when it was really needed?: No   Food Insecurity: Low Risk  (9/20/2023)    Food Insecurity     Within the past 12 months, did you worry that your food would run out before you got money to buy more?: No     Within the past 12 months, did the food you bought just not last and you didn t have money to get more?: No   Transportation Needs: Low Risk  (9/20/2023)    Transportation Needs     Within the past 12 months, has lack of transportation kept you from medical appointments, getting your medicines, non-medical meetings or appointments, work, or from getting things that you need?: No   Housing Stability: Low Risk  (9/20/2023)    Housing Stability     Do you have housing? : Yes     Are you worried about losing your housing?: No       VITALS:  /62   Pulse 87   Temp 97.7  F (36.5  C)   Resp 18   Wt 124.8 kg (275 lb 3.2 oz)   SpO2 98%   BMI 47.24 kg/m      PHYSICAL EXAM    Physical Exam  Vitals and nursing note reviewed. Exam conducted with a chaperone present (TRACY Sharma).   Constitutional:       General: She is not in acute distress.     Appearance: Normal appearance. She is obese. She is not ill-appearing.   HENT:      Head: Normocephalic and atraumatic.      Nose: Nose normal.   Eyes:      Conjunctiva/sclera: Conjunctivae normal.   Cardiovascular:      Rate and Rhythm: Normal  rate.      Pulses: Normal pulses.   Pulmonary:      Effort: Pulmonary effort is normal.   Abdominal:      General: Abdomen is flat. There is no distension.      Palpations: Abdomen is soft. There is no mass.      Tenderness: There is abdominal tenderness (LLQ). There is no right CVA tenderness, left CVA tenderness, guarding or rebound.      Hernia: No hernia is present.   Genitourinary:     Comments: External hemorrhoid present, nonthrombosed, not actively bleeding.  No fissures.  No abscess.  Musculoskeletal:         General: Normal range of motion.      Cervical back: Normal range of motion.      Right lower leg: No edema.      Left lower leg: No edema.   Skin:     General: Skin is warm and dry.      Capillary Refill: Capillary refill takes less than 2 seconds.      Coloration: Skin is not jaundiced or pale.   Neurological:      General: No focal deficit present.      Mental Status: She is alert and oriented to person, place, and time. Mental status is at baseline.   Psychiatric:         Mood and Affect: Mood normal.         Behavior: Behavior normal.         Thought Content: Thought content normal.         Judgment: Judgment normal.            LAB:  All pertinent labs reviewed and interpreted.  Results for orders placed or performed during the hospital encounter of 08/08/24   CTA Abdomen Pelvis with Contrast    Impression    IMPRESSION:  1.  Site of GI bleed not identified.  2.  No bowel obstruction or abscess.     Basic metabolic panel   Result Value Ref Range    Sodium 140 135 - 145 mmol/L    Potassium 3.7 3.4 - 5.3 mmol/L    Chloride 105 98 - 107 mmol/L    Carbon Dioxide (CO2) 26 22 - 29 mmol/L    Anion Gap 9 7 - 15 mmol/L    Urea Nitrogen 11.5 6.0 - 20.0 mg/dL    Creatinine 0.89 0.51 - 0.95 mg/dL    GFR Estimate 90 >60 mL/min/1.73m2    Calcium 8.7 (L) 8.8 - 10.4 mg/dL    Glucose 94 70 - 99 mg/dL   Hepatic function panel   Result Value Ref Range    Protein Total 7.0 6.4 - 8.3 g/dL    Albumin 3.9 3.5 - 5.2 g/dL     Bilirubin Total 0.2 <=1.2 mg/dL    Alkaline Phosphatase 95 40 - 150 U/L    AST 18 0 - 45 U/L    ALT 20 0 - 50 U/L    Bilirubin Direct <0.20 0.00 - 0.30 mg/dL   Result Value Ref Range    Lipase 18 13 - 60 U/L   CBC with platelets and differential   Result Value Ref Range    WBC Count 9.8 4.0 - 11.0 10e3/uL    RBC Count 4.48 3.80 - 5.20 10e6/uL    Hemoglobin 10.3 (L) 11.7 - 15.7 g/dL    Hematocrit 33.8 (L) 35.0 - 47.0 %    MCV 75 (L) 78 - 100 fL    MCH 23.0 (L) 26.5 - 33.0 pg    MCHC 30.5 (L) 31.5 - 36.5 g/dL    RDW 18.1 (H) 10.0 - 15.0 %    Platelet Count 389 150 - 450 10e3/uL    % Neutrophils 57 %    % Lymphocytes 37 %    % Monocytes 5 %    % Eosinophils 1 %    % Basophils 0 %    % Immature Granulocytes 0 %    NRBCs per 100 WBC 0 <1 /100    Absolute Neutrophils 5.6 1.6 - 8.3 10e3/uL    Absolute Lymphocytes 3.6 0.8 - 5.3 10e3/uL    Absolute Monocytes 0.5 0.0 - 1.3 10e3/uL    Absolute Eosinophils 0.1 0.0 - 0.7 10e3/uL    Absolute Basophils 0.0 0.0 - 0.2 10e3/uL    Absolute Immature Granulocytes 0.0 <=0.4 10e3/uL    Absolute NRBCs 0.0 10e3/uL   HCG QUALitative pregnancy (blood)   Result Value Ref Range    hCG Serum Qualitative Negative Negative       RADIOLOGY:  Reviewed all pertinent imaging. Please see official radiology report.  CTA Abdomen Pelvis with Contrast   Final Result   IMPRESSION:   1.  Site of GI bleed not identified.   2.  No bowel obstruction or abscess.             PROCEDURES:   None      [facility] Documentation:   CMS Diagnoses:               I, Toni Mary, am serving as a scribe to document services personally performed by Kishore York MD based on my observation and the provider's statements to me. I, Kishore York MD, attest that Toni Mary is acting in a scribe capacity, has observed my performance of the services and has documented them in accordance with my direction.    Kishore York MD  [facility] EMERGENCY DEPARTMENT  [id number]  [facility] [id number]  [phone]      Kishore York MD  08/09/24 0017

## 2024-08-09 NOTE — ED TRIAGE NOTES
Patient reports abdominal cramping ongoing for few days and rectal bleeding onset today. Patient reports similar symptoms in the past which she states that she ended up being diagnosed with proctitis that ended up needing debridement.     Triage Assessment (Adult)       Row Name 08/08/24 1009          Triage Assessment    Airway WDL WDL        Respiratory WDL    Respiratory WDL WDL        Skin Circulation/Temperature WDL    Skin Circulation/Temperature WDL WDL        Cardiac WDL    Cardiac WDL WDL        Peripheral/Neurovascular WDL    Peripheral Neurovascular WDL WDL        Cognitive/Neuro/Behavioral WDL    Cognitive/Neuro/Behavioral WDL WDL

## 2024-08-09 NOTE — DISCHARGE INSTRUCTIONS
Fortunately there was no identified source of bleeding within the abdomen, and this is likely blood emanating from the hemorrhoids that were seen on exam.    Please follow-up with your primary care provider.    Return to the ER if your symptoms worsen, if you develop fever.

## 2024-09-04 ENCOUNTER — NURSE TRIAGE (OUTPATIENT)
Dept: NURSING | Facility: CLINIC | Age: 29
End: 2024-09-04

## 2024-09-05 NOTE — TELEPHONE ENCOUNTER
"Nurse Triage SBAR    Is this a 2nd Level Triage? NO    Situation: Pt calling with concerns for vaginal bleeding.     Background: Pt states she has been bleeding off and on since a week ago.     Assessment: Pt woke up this evening with blood soaked underwear and blankets. She went to the bathroom where a \"glob\" of \"thick and big\" bloody mass came out of her. Denies inability to stand. She took a pregnancy test a week ago which was negative.    Protocol Recommended Disposition:   Go to ED Now    Recommendation: Pt's mom is going to take her to the ER.     Reason for Disposition   SEVERE abdominal pain    Additional Information   Negative: Shock suspected (e.g., cold/pale/clammy skin, too weak to stand, low BP, rapid pulse)   Negative: Difficult to awaken or acting confused (e.g., disoriented, slurred speech)   Negative: Passed out (i.e., lost consciousness, collapsed and was not responding)   Negative: Sounds like a life-threatening emergency to the triager   Negative: Pregnant 20 or more weeks   Negative: Pregnant < 20 weeks  (less than 5 months)   Negative: Bleeding occurring > 12 months after menopause    Protocols used: Vaginal Bleeding - [place]-O-AN    Nya Loaiza RN  [facility] Nurse Advisor   9/4/2024  10:32 PM  "

## 2024-09-06 ENCOUNTER — HOSPITAL ENCOUNTER (EMERGENCY)
Facility: HOSPITAL | Age: 29
Discharge: LEFT BEFORE DISCHARGE | End: 2024-09-06

## 2024-10-02 ENCOUNTER — OFFICE VISIT (OUTPATIENT)
Dept: MIDWIFE SERVICES | Facility: CLINIC | Age: 29
End: 2024-10-02

## 2024-10-02 VITALS — RESPIRATION RATE: 20 BRPM | HEART RATE: 88 BPM | SYSTOLIC BLOOD PRESSURE: 116 MMHG | DIASTOLIC BLOOD PRESSURE: 52 MMHG

## 2024-10-02 DIAGNOSIS — N97.1 OBSTRUCTION OF FALLOPIAN TUBE: ICD-10-CM

## 2024-10-02 DIAGNOSIS — N93.9 ABNORMAL UTERINE BLEEDING (AUB): Primary | ICD-10-CM

## 2024-10-02 DIAGNOSIS — Z11.3 SCREEN FOR STD (SEXUALLY TRANSMITTED DISEASE): ICD-10-CM

## 2024-10-02 DIAGNOSIS — Z12.4 CERVICAL CANCER SCREENING: ICD-10-CM

## 2024-10-02 DIAGNOSIS — B97.7 HPV (HUMAN PAPILLOMA VIRUS) INFECTION: ICD-10-CM

## 2024-10-02 DIAGNOSIS — R87.610 ATYPICAL SQUAMOUS CELLS OF UNDETERMINED SIGNIFICANCE (ASCUS) ON PAPANICOLAOU SMEAR OF CERVIX: ICD-10-CM

## 2024-10-02 DIAGNOSIS — M79.641 PAIN OF RIGHT HAND: ICD-10-CM

## 2024-10-02 LAB
ALBUMIN SERPL BCG-MCNC: 3.9 G/DL (ref 3.5–5.2)
ALP SERPL-CCNC: 89 U/L (ref 40–150)
ALT SERPL W P-5'-P-CCNC: 17 U/L (ref 0–50)
ANION GAP SERPL CALCULATED.3IONS-SCNC: 9 MMOL/L (ref 7–15)
AST SERPL W P-5'-P-CCNC: 20 U/L (ref 0–45)
BILIRUB SERPL-MCNC: <0.2 MG/DL
BUN SERPL-MCNC: 10.5 MG/DL (ref 6–20)
CALCIUM SERPL-MCNC: 8.7 MG/DL (ref 8.8–10.4)
CHLORIDE SERPL-SCNC: 105 MMOL/L (ref 98–107)
CREAT SERPL-MCNC: 0.85 MG/DL (ref 0.51–0.95)
EGFRCR SERPLBLD CKD-EPI 2021: >90 ML/MIN/1.73M2
ERYTHROCYTE [DISTWIDTH] IN BLOOD BY AUTOMATED COUNT: 17 % (ref 10–15)
EST. AVERAGE GLUCOSE BLD GHB EST-MCNC: 114 MG/DL
FERRITIN SERPL-MCNC: 8 NG/ML (ref 6–175)
FERRITIN SERPL-MCNC: 8 NG/ML (ref 6–175)
GLUCOSE SERPL-MCNC: 105 MG/DL (ref 70–99)
HBA1C MFR BLD: 5.6 % (ref 0–5.6)
HCG INTACT+B SERPL-ACNC: <1 MIU/ML
HCO3 SERPL-SCNC: 23 MMOL/L (ref 22–29)
HCT VFR BLD AUTO: 29.2 % (ref 35–47)
HGB BLD-MCNC: 9.2 G/DL (ref 11.7–15.7)
HOLD SPECIMEN: NORMAL
IRON BINDING CAPACITY (ROCHE): 358 UG/DL (ref 240–430)
IRON BINDING CAPACITY (ROCHE): 360 UG/DL (ref 240–430)
IRON SATN MFR SERPL: 5 % (ref 15–46)
IRON SATN MFR SERPL: 5 % (ref 15–46)
IRON SERPL-MCNC: 19 UG/DL (ref 37–145)
IRON SERPL-MCNC: 19 UG/DL (ref 37–145)
MCH RBC QN AUTO: 23.8 PG (ref 26.5–33)
MCHC RBC AUTO-ENTMCNC: 31.5 G/DL (ref 31.5–36.5)
MCV RBC AUTO: 76 FL (ref 78–100)
PLATELET # BLD AUTO: 356 10E3/UL (ref 150–450)
POTASSIUM SERPL-SCNC: 4.1 MMOL/L (ref 3.4–5.3)
PROT SERPL-MCNC: 7.1 G/DL (ref 6.4–8.3)
RBC # BLD AUTO: 3.86 10E6/UL (ref 3.8–5.2)
SODIUM SERPL-SCNC: 137 MMOL/L (ref 135–145)
TSH SERPL DL<=0.005 MIU/L-ACNC: 0.75 UIU/ML (ref 0.3–4.2)
WBC # BLD AUTO: 9.5 10E3/UL (ref 4–11)

## 2024-10-02 PROCEDURE — 84443 ASSAY THYROID STIM HORMONE: CPT | Performed by: ADVANCED PRACTICE MIDWIFE

## 2024-10-02 PROCEDURE — 80053 COMPREHEN METABOLIC PANEL: CPT | Performed by: ADVANCED PRACTICE MIDWIFE

## 2024-10-02 PROCEDURE — G2211 COMPLEX E/M VISIT ADD ON: HCPCS | Performed by: ADVANCED PRACTICE MIDWIFE

## 2024-10-02 PROCEDURE — 36415 COLL VENOUS BLD VENIPUNCTURE: CPT | Performed by: ADVANCED PRACTICE MIDWIFE

## 2024-10-02 PROCEDURE — 85027 COMPLETE CBC AUTOMATED: CPT | Performed by: ADVANCED PRACTICE MIDWIFE

## 2024-10-02 PROCEDURE — 83550 IRON BINDING TEST: CPT | Performed by: ADVANCED PRACTICE MIDWIFE

## 2024-10-02 PROCEDURE — 83036 HEMOGLOBIN GLYCOSYLATED A1C: CPT | Performed by: ADVANCED PRACTICE MIDWIFE

## 2024-10-02 PROCEDURE — 84702 CHORIONIC GONADOTROPIN TEST: CPT | Performed by: ADVANCED PRACTICE MIDWIFE

## 2024-10-02 PROCEDURE — 99214 OFFICE O/P EST MOD 30 MIN: CPT | Performed by: ADVANCED PRACTICE MIDWIFE

## 2024-10-02 PROCEDURE — 83540 ASSAY OF IRON: CPT | Performed by: ADVANCED PRACTICE MIDWIFE

## 2024-10-02 PROCEDURE — 82728 ASSAY OF FERRITIN: CPT | Performed by: ADVANCED PRACTICE MIDWIFE

## 2024-10-02 ASSESSMENT — ANXIETY QUESTIONNAIRES
GAD7 TOTAL SCORE: 6
GAD7 TOTAL SCORE: 6
4. TROUBLE RELAXING: NEARLY EVERY DAY
1. FEELING NERVOUS, ANXIOUS, OR ON EDGE: NEARLY EVERY DAY
7. FEELING AFRAID AS IF SOMETHING AWFUL MIGHT HAPPEN: NOT AT ALL
5. BEING SO RESTLESS THAT IT IS HARD TO SIT STILL: NOT AT ALL
GAD7 TOTAL SCORE: 6
2. NOT BEING ABLE TO STOP OR CONTROL WORRYING: NOT AT ALL
7. FEELING AFRAID AS IF SOMETHING AWFUL MIGHT HAPPEN: NOT AT ALL
6. BECOMING EASILY ANNOYED OR IRRITABLE: NOT AT ALL
3. WORRYING TOO MUCH ABOUT DIFFERENT THINGS: NOT AT ALL
IF YOU CHECKED OFF ANY PROBLEMS ON THIS QUESTIONNAIRE, HOW DIFFICULT HAVE THESE PROBLEMS MADE IT FOR YOU TO DO YOUR WORK, TAKE CARE OF THINGS AT HOME, OR GET ALONG WITH OTHER PEOPLE: NOT DIFFICULT AT ALL
8. IF YOU CHECKED OFF ANY PROBLEMS, HOW DIFFICULT HAVE THESE MADE IT FOR YOU TO DO YOUR WORK, TAKE CARE OF THINGS AT HOME, OR GET ALONG WITH OTHER PEOPLE?: NOT DIFFICULT AT ALL

## 2024-10-02 ASSESSMENT — PATIENT HEALTH QUESTIONNAIRE - PHQ9
10. IF YOU CHECKED OFF ANY PROBLEMS, HOW DIFFICULT HAVE THESE PROBLEMS MADE IT FOR YOU TO DO YOUR WORK, TAKE CARE OF THINGS AT HOME, OR GET ALONG WITH OTHER PEOPLE: NOT DIFFICULT AT ALL
SUM OF ALL RESPONSES TO PHQ QUESTIONS 1-9: 7
SUM OF ALL RESPONSES TO PHQ QUESTIONS 1-9: 7

## 2024-10-02 NOTE — PROGRESS NOTES
"Assessment:   1.  AUB/menorrhagia  2.  History of abnormal Pap smear--ASCUS with HPV  3.  Nonpatent fallopian tubes bilaterally per patient  4.  Contraceptive management--infertile  5.  Abnormal Pap smear--ASCUS  6.  HPV positive  7.  STD screening    Plan:      1. Discussed nutrition and exercise.  Advised MVI, Vitamin D3 4000IU geltab and an omega 3 supplement daily   2. Blood tests: CBC with platelets, CMP, ferritin, quantitative beta-hCG, hemoglobin A1c, iron and iron binding capacity, TSH, and chlamydia/gonorrhea.  3. Breast self exam technique reviewed and patient encouraged to perform self-exam monthly.   4. Contraception: None.  Per patient, fallopian tubes nonpatent bilaterally.  We discussed the use of both combined oral contraceptive pills and Mirena IUD if appropriate, and patient DECLINES due to side effect of weight gain with both methods.  She prefers to meet with OB/GYN in order to discuss endometrial ablation.  Referred to [facility].  Also had lab  5.  Next pap due today, but patient declines due to vaginal bleeding.  6.  RTC 1 year for annual physical exam, PRN    Subjective:   Juan R Uriostegui is a 29 year old female who presents for a a problem visit.  Patient states that she has had a continuous.  Since August 15, 2024.  Her previous menstrual period was July 2024 and lasted 5 days.  She was seen in the emergency department on 9/5/2024 for abnormal uterine bleeding (please see progress note.) Quantitative beta-hCG did not reveal suspicion of a miscarriage.  Patient states that she had a hysterosalpingogram performed last year which revealed \"blocked fallopian tubes,\" and she was told she was infertile.  She is currently in the middle of a divorce.  She feels fatigued.  Hemoglobin was slightly low during the emergency room visit 4 weeks ago.  Patient states that she has been on birth control in the past: Combined oral contraceptive pills and Mirena IUD both of which \"made me gain " "weight  100 pounds on Mirena IUD.\"  Patient does not want to grow her family.      Immunization History   Administered Date(s) Administered    COVID-19 Monovalent 18+ (Moderna) 2021, 2021    DTAP (<7y) 1995, 1995, 1995, 1996, 2000    Flu, Unspecified 2013    HIB, Unspecified 1995, 1995, 1995, 1996    HPV Quadrivalent 2007, 10/19/2007, 2008    HPV9 2019    Hepatitis B, Adult 2016, 2016, 2017    Hepatitis B, Peds 1995, 1995, 1995    Influenza (IIV3) PF 2012    Influenza Intranasal Vaccine 2009    Influenza Vaccine >6 months,quad, PF 2013, 2016, 2020, 2023    Influenza Vaccine, 6+MO IM (QUADRIVALENT W/PRESERVATIVES) 2013, 2018    MMR 1996, 2000    Mantoux Tuberculin Skin Test 1996    Meningococcal ACWY (Menactra ) 2009, 2013    Meningococcal ACWY (Menveo ) 2013    Nasal Influenza Vaccine 2-49 (FluMist) 10/24/2014    OPV, trivalent, live 1995, 1995, 1995, 1996    Poliovirus, inactivated (IPV) 2000    TD,PF 7+ (Tenivac) 2019    TDAP (Adacel,Boostrix) 2007, 2017    Varicella 2000, 2009       Gynecologic History  Patient's last menstrual period was 08/15/2024 (approximate).  Contraception: Per patient, fallopian tubes are blocked confirmed by hysterosalpingogram in .    Cancer screening:   Last Pap: October 15, 2018. Results were: ASCUS/POSITIVE other HPV without follow-up        OB History    Para Term  AB Living   1 1 1 0 0 1   SAB IAB Ectopic Multiple Live Births   0 0 0 0 1      # Outcome Date GA Lbr Donn/2nd Weight Sex Type Anes PTL Lv   1 Term 17 39w6d / 00:46 2.912 kg (6 lb 6.7 oz) M Vag-Spont EPI N JAVAN      Birth Comments: Time of Birth: 10:52pmBili Result: Type: sercum Level 9.6 Age 34 hoursNB Screening Completed: YesHearing " Screening Result: Moise's Blood Type: O positiveAny Complicating Factors: No      Name: JOSE BABB      Apgar1: 9  Apgar5: 9       Current Outpatient Medications   Medication Sig Dispense Refill    citalopram (CELEXA) 20 MG tablet Take 1 tablet (20 mg) by mouth daily 30 tablet 11    clonazePAM (KLONOPIN) 1 MG tablet Take 1 tablet (1 mg) by mouth 2 times daily *Need appointment with Dr Donaldson for further refills 60 tablet 0    dicyclomine (BENTYL) 20 MG tablet Take 2 tablets (40 mg) by mouth 2 times daily 100 tablet 11    lithium (ESKALITH) 300 MG tablet Take 600 mg in the morning , 300 mg in the afternoon  And 600 mg at nighttime 150 tablet 11    omeprazole (PRILOSEC) 20 MG DR capsule TAKE 1 CAPSULE(20 MG) BY MOUTH DAILY BEFORE AND BREAKFAST 90 capsule 3    ondansetron (ZOFRAN ODT) 4 MG ODT tab Take 1 tablet (4 mg) by mouth every 8 hours as needed for nausea 30 tablet 1    ondansetron (ZOFRAN ODT) 4 MG ODT tab Take 1 tablet (4 mg) by mouth every 8 hours as needed for nausea 20 tablet 0    pregabalin (LYRICA) 75 MG capsule Take 1 capsule (75 mg) by mouth 3 times daily 90 capsule 2    tolterodine ER (DETROL LA) 2 MG 24 hr capsule TAKE 1 CAPSULE(2 MG) BY MOUTH DAILY 90 capsule 3    topiramate (TOPAMAX) 50 MG tablet TAKE 1 TABLET(50 MG) BY MOUTH TWICE DAILY 180 tablet 1    norgestrel-ethinyl estradiol (LO/OVRAL) 0.3-30 MG-MCG tablet Take 2 tablets by mouth. (Patient not taking: Reported on 10/2/2024)       Past Medical History:   Diagnosis Date    Asthma     Bipolar 1 disorder (H)     Depression     IBS (irritable bowel syndrome)     Obesity      Past Surgical History:   Procedure Laterality Date    HYSTEROSALPINGOGRAM  2023    Blocked Fallopian Tubes    [place] LAP,APPENDECTOMY N/A 02/15/2021    Procedure: APPENDECTOMY, LAPAROSCOPIC, Excision of epiploic appendagitis;  Surgeon: Bonnie Kaur MD;  Location: [facility];  Service: General    TONSILLECTOMY       Patient has no known  allergies.  Family History   Problem Relation Age of Onset    Colon Cancer Mother     Cancer Father     Cervical Cancer Maternal Grandmother      Social History     Socioeconomic History    Marital status: Single     Spouse name: Not on file    Number of children: 1    Years of education: Not on file    Highest education level: Not on file   Occupational History    Not on file   Tobacco Use    Smoking status: Never     Passive exposure: Never    Smokeless tobacco: Never   Vaping Use    Vaping status: Never Used   Substance and Sexual Activity    Alcohol use: No    Drug use: Never    Sexual activity: Yes     Partners: Male   Other Topics Concern    Not on file   Social History Narrative    Works at a nursing home.  Has 1 3-year-old son.     Social Determinants of Health     Financial Resource Strain: Low Risk  (9/20/2023)    Financial Resource Strain     Within the past 12 months, have you or your family members you live with been unable to get utilities (heat, electricity) when it was really needed?: No   Food Insecurity: Low Risk  (9/20/2023)    Food Insecurity     Within the past 12 months, did you worry that your food would run out before you got money to buy more?: No     Within the past 12 months, did the food you bought just not last and you didn t have money to get more?: No   Transportation Needs: Low Risk  (9/20/2023)    Transportation Needs     Within the past 12 months, has lack of transportation kept you from medical appointments, getting your medicines, non-medical meetings or appointments, work, or from getting things that you need?: No   Physical Activity: Not on file   Stress: Not on file   Social Connections: Not on file   Interpersonal Safety: Unknown (9/5/2024)    Received from [facility]    Humiliation, Afraid, Rape, and Kick questionnaire     Fear of Current or Ex-Partner: Not on file     Emotionally Abused: No     Physically Abused: No     Sexually Abused: No   Housing Stability: Low Risk   (9/20/2023)    Housing Stability     Do you have housing? : Yes     Are you worried about losing your housing?: No       Review of Systems  General:  Denies problem  Eyes: Denies problem  Ears/Nose/Throat: Denies problem  Cardiovascular: Denies problem  Respiratory:  Denies problem  Gastrointestinal:  denies problems  Genitourinary: vaginal bleeding  Musculoskeletal:  Denies problem  Skin: Denies problem  Neurologic:denies problems  Psychiatric:  Chronic mental health diagnoses managed with medication therapy  Endocrine: fatigue        Objective:      Vitals:    10/02/24 1300   BP: 116/52   Pulse: 88   Resp: 20       Physical Exam:  General Appearance: Alert, cooperative, no distress, appears stated age  Skin: Skin color, texture, turgor normal, no rashes or lesions  Throat: Lips, mucosa, and tongue normal; teeth and gums normal  HEENT: grossly normal; otoscopic and opthalmic exam not performed.   Pelvic: DECLINED due to vaginal bleeding.      Patient is hemodynamically stable    US Pelvic Transabdominal and Transvaginal  Order: [id number]  Narrative    EXAM: US PELVIC COMPLETE W EV  LOCATION: [facility]  DATE: 9/5/2024    INDICATION: Menorrhagia  COMPARISON: None.  TECHNIQUE: Transabdominal scans were performed. Endovaginal ultrasound was performed to better visualize the adnexa. Color flow with spectral Doppler and waveform analysis performed.    FINDINGS:    UTERUS: 8.5 x 4.3 x 4.5 cm. Normal in size and position with no masses.    ENDOMETRIUM: 9 mm. There are 2 endometrial cysts measuring up to 5 mm.    RIGHT OVARY: 2.0 x 2.9 x 1.6 cm. Normal with arterial and venous duplex flow identified.    LEFT OVARY: 2.6 x 1.6 x 1.7 cm. Normal with arterial and venous duplex flow identified.    No significant free fluid.    IMPRESSION:    1.  There are 2 small endometrial cysts. The endometrium is overall normal in thickness.  2.  Otherwise unremarkable pelvis.  Exam End: 09/05/24  3:51 AM    Specimen Collected:  09/05/24  3:51 AM      Answers submitted by the patient for this visit:  Patient Health Questionnaire (Submitted on 10/2/2024)  If you checked off any problems, how difficult have these problems made it for you to do your work, take care of things at home, or get along with other people?: Not difficult at all  PHQ9 TOTAL SCORE: 7  Patient Health Questionnaire (G7) (Submitted on 10/2/2024)  ISELA 7 TOTAL SCORE: 6

## 2024-10-03 PROBLEM — N93.9 ABNORMAL UTERINE BLEEDING (AUB): Status: ACTIVE | Noted: 2024-10-03

## 2024-10-03 PROBLEM — R87.610 ATYPICAL SQUAMOUS CELLS OF UNDETERMINED SIGNIFICANCE (ASCUS) ON PAPANICOLAOU SMEAR OF CERVIX: Status: ACTIVE | Noted: 2024-10-03

## 2024-10-03 PROBLEM — M79.662 PAIN OF LEFT LOWER LEG: Status: RESOLVED | Noted: 2021-08-11 | Resolved: 2024-10-03
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
## 2024-10-03 PROBLEM — Z12.4 CERVICAL CANCER SCREENING: Status: ACTIVE | Noted: 2024-10-03

## 2024-10-03 PROBLEM — B97.7 HPV (HUMAN PAPILLOMA VIRUS) INFECTION: Status: ACTIVE | Noted: 2024-10-03

## 2024-10-03 PROBLEM — Z30.430 ENCOUNTER FOR INSERTION OF MIRENA IUD: Status: RESOLVED | Noted: 2018-10-15 | Resolved: 2024-10-03

## 2024-10-17 ENCOUNTER — APPOINTMENT (OUTPATIENT)
Dept: RADIOLOGY | Facility: HOSPITAL | Age: 29
End: 2024-10-17

## 2024-10-17 ENCOUNTER — HOSPITAL ENCOUNTER (EMERGENCY)
Facility: HOSPITAL | Age: 29
Discharge: HOME | End: 2024-10-17

## 2024-10-17 VITALS
SYSTOLIC BLOOD PRESSURE: 123 MMHG | HEART RATE: 101 BPM | HEIGHT: 64 IN | TEMPERATURE: 98.3 F | BODY MASS INDEX: 46.95 KG/M2 | OXYGEN SATURATION: 99 % | RESPIRATION RATE: 16 BRPM | WEIGHT: 275 LBS | DIASTOLIC BLOOD PRESSURE: 74 MMHG
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
## 2024-10-17 DIAGNOSIS — R51.9 HEADACHE: ICD-10-CM

## 2024-10-17 DIAGNOSIS — M25.511 ACUTE PAIN OF RIGHT SHOULDER: ICD-10-CM

## 2024-10-17 PROCEDURE — 99284 EMERGENCY DEPT VISIT MOD MDM: CPT

## 2024-10-17 PROCEDURE — 96372 THER/PROPH/DIAG INJ SC/IM: CPT

## 2024-10-17 PROCEDURE — 250N000013 HC RX MED GY IP 250 OP 250 PS 637

## 2024-10-17 PROCEDURE — 73030 X-RAY EXAM OF SHOULDER: CPT | Mod: RT

## 2024-10-17 PROCEDURE — 250N000011 HC RX IP 250 OP 636

## 2024-10-17 RX ORDER — ONDANSETRON 4 MG/1
4 TABLET, ORALLY DISINTEGRATING ORAL ONCE
Status: COMPLETED | OUTPATIENT
Start: 2024-10-17 | End: 2024-10-17

## 2024-10-17 RX ORDER — LIDOCAINE 4 G/G
1 PATCH TOPICAL ONCE
Status: DISCONTINUED | OUTPATIENT
Start: 2024-10-17 | End: 2024-10-17 | Stop reason: HOSPADM

## 2024-10-17 RX ORDER — LIDOCAINE 4 G/G
1 PATCH TOPICAL EVERY 24 HOURS
Qty: 5 PATCH | Refills: 0 | Status: SHIPPED | OUTPATIENT
Start: 2024-10-17

## 2024-10-17 RX ORDER — ONDANSETRON 4 MG/1
4 TABLET, ORALLY DISINTEGRATING ORAL EVERY 8 HOURS PRN
Qty: 10 TABLET | Refills: 0 | Status: SHIPPED | OUTPATIENT
Start: 2024-10-17 | End: 2024-10-20

## 2024-10-17 RX ORDER — METHOCARBAMOL 500 MG/1
1000 TABLET, FILM COATED ORAL 4 TIMES DAILY PRN
Qty: 20 TABLET | Refills: 0 | Status: SHIPPED | OUTPATIENT
Start: 2024-10-17

## 2024-10-17 RX ORDER — KETOROLAC TROMETHAMINE 15 MG/ML
15 INJECTION, SOLUTION INTRAMUSCULAR; INTRAVENOUS ONCE
Status: COMPLETED | OUTPATIENT
Start: 2024-10-17 | End: 2024-10-17

## 2024-10-17 RX ADMIN — LIDOCAINE 1 PATCH: 4 PATCH TOPICAL at 16:10

## 2024-10-17 RX ADMIN — ONDANSETRON 4 MG: 4 TABLET, ORALLY DISINTEGRATING ORAL at 16:10

## 2024-10-17 RX ADMIN — KETOROLAC TROMETHAMINE 15 MG: 15 INJECTION, SOLUTION INTRAMUSCULAR; INTRAVENOUS at 16:12

## 2024-10-17 ASSESSMENT — ACTIVITIES OF DAILY LIVING (ADL): ADLS_ACUITY_SCORE: 35

## 2024-10-17 NOTE — Clinical Note
Juan R Uriostegui was seen and treated in our emergency department on 10/17/2024.  She may return to work on 10/22/2024.       If you have any questions or concerns, please don't hesitate to call.      Vicenta Heredia PA-C

## 2024-10-17 NOTE — ED NOTES
Pt discharged ambulatory to home at 1645. All questions answered. Pt expressed understanding of info

## 2024-10-17 NOTE — DISCHARGE INSTRUCTIONS
Rest, ice/heat, elevate your extremity, increase activity as tolerates. You may use topical Icy Hot or Lidoderm as needed. You may use Lidocaine patch as needed - only keep in place for 12 hrs out of every 24 hrs (leaving this in place longer than 12 hours can cause toxicity). You may use ibuprofen for swelling/pain and Tylenol as needed for pain. You may use Robaxin as needed for muscle spasms.     Your symptoms are likely related to post-concussion syndrome. Rest, protect your brain as it is most vulnerable at this time, refrain from any physical activity/contact sports until your symptoms have completely resolved.     Concussion cares: low stimulation/calm activity only, hydration, good nutrition, sleep hygiene, frequent rest breaks, limit screen time.    You can take Zofran as needed for nausea.    Tylenol (Acetaminophen) Discharge Instructions:  You may take 2 tablets of regular strength, over-the-counter, Tylenol (acetaminophen) every 4-6 hours as needed for pain.  Take no more than 4000 mg of Tylenol in a 24-hour period.      Avoid taking more than 1 acetaminophen-containing product at a time and be aware that many over-the-counter medications contain a combination of acetaminophen and other products.  If you are taking Tylenol in addition to a combination product please keep track of your daily acetaminophen dose to make sure you do not exceed the recommended 4000 mg.  Taking too much acetaminophen can cause permanent damage to your liver.    Ibuprofen/Naproxen Discharge Instructions:  You may take ibuprofen for pain control.  The maximum dose of (ibuprofen is 3200 mg ) in a 24-hour period.    Take this medication with food to prevent stomach irritation.  With long-term use this medication can irritate the stomach causing pain and lead to development of a stomach ulcer.  If you notice stomach pain or vomiting of coffee-ground colored vomit or blood, please be seen by a healthcare provider.  Attempt to use  this medication for the shortest time possible.      Please follow-up with your primary care provider for reevaluation and ongoing management.  You can also follow-up with a Concussion Clinic, referral was placed today.    Return to the ER if any new or worsening symptoms develop including worsening headache, confusion, repeated vomiting, increased confusion/restlessness/agitation, dizziness, weakness/numbness/decreased coordination, facial droop, speech difficulty, drowsiness or inability to wake up, seizures, or other new symptoms.    Take Care!  - Vicenta Heredia PA-C
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
## 2024-10-21 ENCOUNTER — NURSE TRIAGE (OUTPATIENT)
Dept: FAMILY MEDICINE | Facility: CLINIC | Age: 29
End: 2024-10-21
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
## 2024-10-22 ENCOUNTER — PATIENT OUTREACH (OUTPATIENT)
Dept: CARE COORDINATION | Facility: CLINIC | Age: 29
End: 2024-10-22

## 2024-10-22 NOTE — PROGRESS NOTES
Connected Care Resource [place]  Community Health Worker Initial Outreach    CHW Initial Information Gathering:  Referral Source: ED Follow-Up  CHW Additional Questions  If ED/Hospital discharge, follow-up appointment scheduled as recommended?: Yes (Pt has follow-up appointment scheduled for 10/23/2024. This was scheduled prior to outreach.)  MyChart active?: Yes    Patient accepts CC: No, patient declined care coordination services at this time. Patient will be sent Care Coordination introduction letter for future reference.       Kinjal Rice  Community Health Worker  Connected Care Resource [place], [facility]    *Connected Care Resource Team does NOT follow patient ongoing. Referrals are identified based on internal discharge reports and the outreach is to ensure patient has an understanding of their discharge instructions.

## 2024-10-22 NOTE — LETTER
Juan R Uriostegui  [address] [place] [address]    Dear Juan R Uriostegui,      I am a team member within the [facility] with [facility]. I recently contacted you to ensure you are doing well following a visit within our health system. I also wanted to take this chance to introduce Clinic Care Coordination should you have any interest in this program in the future.    Below is a description of Clinic Care Coordination and how this team can further assist you:       The Clinic Care Coordination team is made up of a Registered Nurse, , Financial Resource Worker, and a Community Health Worker who understand and can help navigate the health care system. The goal of clinic care coordination is to help you manage your health, improve access to care, and achieve optimal health outcomes. They work alongside your provider to assist you in determining your health and social needs, obtain health care and community resources, and provide you with necessary information and education. Clinic Care Coordination can work with you through any barriers and develop a care plan that helps coordinate and strengthen the relationship between you and your care team.    If you wish to connect with the Clinic Care Coordination Team, please let your [facility] Primary Care Provider or Clinic Care Team know and they can place a referral. The Clinic Care Coordination team will then reach out by phone to further support you.    We are focused on providing you with the highest-quality healthcare experience possible.    Sincerely,   Your care team with [facility]

## 2024-10-23 ENCOUNTER — VIRTUAL VISIT (OUTPATIENT)
Dept: FAMILY MEDICINE | Facility: CLINIC | Age: 29
End: 2024-10-23

## 2024-10-23 DIAGNOSIS — S06.0X0D CONCUSSION WITHOUT LOSS OF CONSCIOUSNESS, SUBSEQUENT ENCOUNTER: Primary | ICD-10-CM

## 2024-10-23 PROCEDURE — 99441 PR PHYSICIAN TELEPHONE EVALUATION 5-10 MIN: CPT | Mod: 93 | Performed by: FAMILY MEDICINE
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
## 2024-10-23 NOTE — LETTER
10/23/2024    Juan R Uriostegui   1995        To Whom it May Concern;    Please excuse Juan R Uriostegui from work/school for a healthcare visit on Oct 23, 2024. Due to medical reasons, it is recommended for patient to be off of work from 10/24/2024-10/31/2024. Further recommendations will depend on response to treatment.     Sincerely,          Shahram Hennessy MD  [facility] [place] [address]  Phone: [phone]  Fax: [phone]    10/23/2024  2:04 PM

## 2024-10-28 PROBLEM — S06.0X0D CONCUSSION WITHOUT LOSS OF CONSCIOUSNESS, SUBSEQUENT ENCOUNTER: Status: ACTIVE | Noted: 2024-10-28

## 2024-12-01 ENCOUNTER — HOSPITAL ENCOUNTER (EMERGENCY)
Facility: HOSPITAL | Age: 29
Discharge: HOME | End: 2024-12-01
Attending: EMERGENCY MEDICINE

## 2024-12-01 VITALS
HEIGHT: 64 IN | HEART RATE: 88 BPM | BODY MASS INDEX: 46.95 KG/M2 | RESPIRATION RATE: 14 BRPM | DIASTOLIC BLOOD PRESSURE: 70 MMHG | TEMPERATURE: 97.7 F | SYSTOLIC BLOOD PRESSURE: 122 MMHG | OXYGEN SATURATION: 99 % | WEIGHT: 275 LBS

## 2024-12-01 DIAGNOSIS — K92.1 BLOOD IN STOOL: ICD-10-CM

## 2024-12-01 DIAGNOSIS — Z87.19 HISTORY OF HEMORRHOIDS: ICD-10-CM

## 2024-12-01 LAB
ABO/RH(D): NORMAL
ALBUMIN SERPL BCG-MCNC: 3.9 G/DL (ref 3.5–5.2)
ALP SERPL-CCNC: 89 U/L (ref 40–150)
ALT SERPL W P-5'-P-CCNC: 24 U/L (ref 0–50)
ANION GAP SERPL CALCULATED.3IONS-SCNC: 9 MMOL/L (ref 7–15)
ANTIBODY SCREEN: NEGATIVE
AST SERPL W P-5'-P-CCNC: 24 U/L (ref 0–45)
BASOPHILS # BLD AUTO: 0 10E3/UL (ref 0–0.2)
BASOPHILS NFR BLD AUTO: 0 %
BILIRUB SERPL-MCNC: 0.3 MG/DL
BUN SERPL-MCNC: 10.8 MG/DL (ref 6–20)
CALCIUM SERPL-MCNC: 8.4 MG/DL (ref 8.8–10.4)
CHLORIDE SERPL-SCNC: 104 MMOL/L (ref 98–107)
CREAT SERPL-MCNC: 0.81 MG/DL (ref 0.51–0.95)
EGFRCR SERPLBLD CKD-EPI 2021: >90 ML/MIN/1.73M2
EOSINOPHIL # BLD AUTO: 0.1 10E3/UL (ref 0–0.7)
EOSINOPHIL NFR BLD AUTO: 1 %
ERYTHROCYTE [DISTWIDTH] IN BLOOD BY AUTOMATED COUNT: 17 % (ref 10–15)
GLUCOSE SERPL-MCNC: 95 MG/DL (ref 70–99)
HCO3 SERPL-SCNC: 25 MMOL/L (ref 22–29)
HCT VFR BLD AUTO: 35 % (ref 35–47)
HGB BLD-MCNC: 10.3 G/DL (ref 11.7–15.7)
IMM GRANULOCYTES # BLD: 0 10E3/UL
IMM GRANULOCYTES NFR BLD: 0 %
INR PPP: 1.01 (ref 0.85–1.15)
LYMPHOCYTES # BLD AUTO: 1.9 10E3/UL (ref 0.8–5.3)
LYMPHOCYTES NFR BLD AUTO: 20 %
MCH RBC QN AUTO: 22.6 PG (ref 26.5–33)
MCHC RBC AUTO-ENTMCNC: 29.4 G/DL (ref 31.5–36.5)
MCV RBC AUTO: 77 FL (ref 78–100)
MONOCYTES # BLD AUTO: 0.4 10E3/UL (ref 0–1.3)
MONOCYTES NFR BLD AUTO: 4 %
NEUTROPHILS # BLD AUTO: 7.1 10E3/UL (ref 1.6–8.3)
NEUTROPHILS NFR BLD AUTO: 74 %
NRBC # BLD AUTO: 0 10E3/UL
NRBC BLD AUTO-RTO: 0 /100
PLATELET # BLD AUTO: 362 10E3/UL (ref 150–450)
POTASSIUM SERPL-SCNC: 3.8 MMOL/L (ref 3.4–5.3)
PROT SERPL-MCNC: 6.9 G/DL (ref 6.4–8.3)
RBC # BLD AUTO: 4.55 10E6/UL (ref 3.8–5.2)
SODIUM SERPL-SCNC: 138 MMOL/L (ref 135–145)
SPECIMEN EXPIRATION DATE: NORMAL
WBC # BLD AUTO: 9.6 10E3/UL (ref 4–11)

## 2024-12-01 PROCEDURE — 82247 BILIRUBIN TOTAL: CPT | Performed by: EMERGENCY MEDICINE

## 2024-12-01 PROCEDURE — 85610 PROTHROMBIN TIME: CPT | Performed by: EMERGENCY MEDICINE

## 2024-12-01 PROCEDURE — 80051 ELECTROLYTE PANEL: CPT | Performed by: EMERGENCY MEDICINE

## 2024-12-01 PROCEDURE — 85025 COMPLETE CBC W/AUTO DIFF WBC: CPT | Performed by: STUDENT IN AN ORGANIZED HEALTH CARE EDUCATION/TRAINING PROGRAM

## 2024-12-01 PROCEDURE — 36415 COLL VENOUS BLD VENIPUNCTURE: CPT | Performed by: STUDENT IN AN ORGANIZED HEALTH CARE EDUCATION/TRAINING PROGRAM

## 2024-12-01 PROCEDURE — 85014 HEMATOCRIT: CPT | Performed by: STUDENT IN AN ORGANIZED HEALTH CARE EDUCATION/TRAINING PROGRAM

## 2024-12-01 PROCEDURE — 86900 BLOOD TYPING SEROLOGIC ABO: CPT | Performed by: EMERGENCY MEDICINE

## 2024-12-01 PROCEDURE — 85025 COMPLETE CBC W/AUTO DIFF WBC: CPT | Performed by: EMERGENCY MEDICINE

## 2024-12-01 PROCEDURE — 99284 EMERGENCY DEPT VISIT MOD MDM: CPT | Performed by: EMERGENCY MEDICINE

## 2024-12-01 PROCEDURE — 85610 PROTHROMBIN TIME: CPT | Performed by: STUDENT IN AN ORGANIZED HEALTH CARE EDUCATION/TRAINING PROGRAM

## 2024-12-01 PROCEDURE — 86850 RBC ANTIBODY SCREEN: CPT | Performed by: EMERGENCY MEDICINE

## 2024-12-01 RX ORDER — HYDROCORTISONE ACETATE 25 MG/1
25 SUPPOSITORY RECTAL 2 TIMES DAILY PRN
Qty: 14 SUPPOSITORY | Refills: 0 | Status: SHIPPED | OUTPATIENT
Start: 2024-12-01

## 2024-12-01 ASSESSMENT — COLUMBIA-SUICIDE SEVERITY RATING SCALE - C-SSRS
6. HAVE YOU EVER DONE ANYTHING, STARTED TO DO ANYTHING, OR PREPARED TO DO ANYTHING TO END YOUR LIFE?: NO
2. HAVE YOU ACTUALLY HAD ANY THOUGHTS OF KILLING YOURSELF IN THE PAST MONTH?: NO
1. IN THE PAST MONTH, HAVE YOU WISHED YOU WERE DEAD OR WISHED YOU COULD GO TO SLEEP AND NOT WAKE UP?: NO

## 2024-12-01 ASSESSMENT — ACTIVITIES OF DAILY LIVING (ADL)
ADLS_ACUITY_SCORE: 41
ADLS_ACUITY_SCORE: 41

## 2024-12-01 NOTE — DISCHARGE INSTRUCTIONS
Take medicaiton as directed. Follow up with GI and primary clinic.  Return for worsening concerns.

## 2024-12-01 NOTE — Clinical Note
Juan R Uriostegui was seen and treated in our emergency department on 12/1/2024.  She may return to work on 12/02/2024.       If you have any questions or concerns, please don't hesitate to call.      Cricket Russ MD

## 2024-12-01 NOTE — ED TRIAGE NOTES
"Pt ambulatory to triage c/o rectal bleeding and feeling very \"sleepy\" x 2 days. Pt states she has been passing bright red blood with her stools the past couple days, no blood clots. Pt reports hx of this secondary to hemorrhoids. Hx IBS. Pt reports occasional nausea without vomiting.     Triage Assessment (Adult)       Row Name 12/01/24 1317          Triage Assessment    Airway WDL WDL        Respiratory WDL    Respiratory WDL WDL        Skin Circulation/Temperature WDL    Skin Circulation/Temperature WDL WDL        Cardiac WDL    Cardiac WDL WDL        Peripheral/Neurovascular WDL    Peripheral Neurovascular WDL WDL        Cognitive/Neuro/Behavioral WDL    Cognitive/Neuro/Behavioral WDL X  Pt reports feeling sleepy the past 2 days        [place] Coma Scale    Best Eye Response 4-->(E4) spontaneous     Best Motor Response 6-->(M6) obeys commands     Best Verbal Response 5-->(V5) oriented     [place] Coma Scale Score 15                     "

## 2024-12-01 NOTE — ED PROVIDER NOTES
"  Emergency Department Encounter     Evaluation Date & Time:   12/1/2024  1:19 PM    CHIEF COMPLAINT:  Rectal Bleeding      Triage Note:Pt ambulatory to triage c/o rectal bleeding and feeling very \"sleepy\" x 2 days. Pt states she has been passing bright red blood with her stools the past couple days, no blood clots. Pt reports hx of this secondary to hemorrhoids. Hx IBS. Pt reports occasional nausea without vomiting.        ED COURSE & MEDICAL DECISION MAKING:     Pt with a history of IBS and hemorrhoids, here for evaluation of rectal bleeding with BM for the past 2 days or so.  Pt feeling fatigued, sleeping more.  No anticoagulation, no significant bleeding. No abdominal pain/tenderness.  Pt nontoxic on exam.  Discussed results with her, which are back.  No indication for CT or CTA abd/pelvis. Pt requesting work note, which I provided.        ED Course as of 12/01/24 1935   Sun Dec 01, 2024   1401  I met with the patient, obtained history, performed an initial exam, and discussed options and plan for diagnostics and treatment here in the ED. We discussed plans for discharge including supportive cares, symptomatic treatment, outpatient follow up, and reasons to return to the emergency department.   1424 Hgb stable from baseline.  Labs all reassuring. Reassuring exam, no indication for CTA or CT abd/pelvis.  Discussed with pt treatment, outpatient follow up. Rx for hydrocortisone suppositories.         Medical Decision Making    History:  Supplemental history from: N/A  External Record(s) reviewed: Documented in chart and Outpatient Record: 10/02/2024 [facility] Visit    Work Up:  Chart documentation includes differential considered and any EKGs or imaging independently interpreted by provider, where specified.  In additional to work up documented, I considered the following work up: Documented in chart, if applicable.    External consultation:  Discussion of management with another " provider: Documented in chart, if applicable    Complicating factors:  Care impacted by chronic illness: Other: Irritable bowel syndrome  Care affected by social determinants of health: N/A    Disposition considerations: Discharge. I prescribed additional prescription strength medication(s) as charted. See documentation for any additional details.    Not Applicable     At the conclusion of the encounter I discussed the results of all the tests and the disposition. The questions were answered. The patient or family acknowledged understanding and was agreeable with the care plan.      MEDICATIONS GIVEN IN THE EMERGENCY DEPARTMENT:  Medications - No data to display    NEW PRESCRIPTIONS STARTED AT TODAY'S ED VISIT:  Discharge Medication List as of 12/1/2024  3:13 PM        START taking these medications    Details   hydrocortisone (ANUSOL-HC) 25 MG suppository Place 1 suppository (25 mg) rectally 2 times daily as needed for hemorrhoids., Disp-14 suppository, R-0, E-Prescribe             HPI   HPI     Juan R Uriostegui is a 29 year old female with a pertinent history of irritable bowel syndrom and microcytic anemiae who presents to this ED by walk-in for evaluation of rectal bleeding.     The patient has had rectal bleeding for the past few days. She has had hemorrhoids in the past, but says that she is bleeding more than usual this time. She also reports some associated nausea, generalized weakness and trouble staying awake during the day. She is able to sleep okay at night. The patient endorses some rectal soreness that is exacerbated by lying down flat or sitting.    She otherwise denies having constipation, hard stools, fever or urinary symptoms. She denies any chance of pregnancy.    The patient is not on blood thinners. She does have IBS. She denies any recent medication changes or new medications. She has had surgery on her sigmoid colon in the past, but has not recently visited with GI. Of note, she has a history  of abnormally long menstrual cycles.    Per Chart Review, the patient was seen on 10/02/2024 at [facility] [place]:   The patient is diagnosed with AUB/menorrhagia.  She had a history of an abnormal Pap smear-ASCUS with HPV.  She was found to have nonpatent fallopian tubes bilaterally.  Contraceptive management-infertile.  CBC was with hemoglobin of 9.2.    Per Chart Review, the patient was seen on 08/08/2024 at [facility] for evaluation of rectal bleeding. On rectal exam, she had an external hemorrhoid that was not thrombosed or actively bleeding. She also endorsed some left lower quadrant abdominal pain. Her pregnancy test was negative. Labs did not reveal transaminitis or pancreatitis. She had no electrolyte abnormalities or kidney injury. She did not have leukocytosis, baseline anemia from one year prior. CTA abdomen was reassuring without signs of active bleeding in the abdomen. The patient was discharged.    REVIEW OF SYSTEMS:  See HPI      Medical History     Past Medical History:   Diagnosis Date    Asthma     Bipolar 1 disorder (H)     Depression     IBS (irritable bowel syndrome)     Obesity        Past Surgical History:   Procedure Laterality Date    HYSTEROSALPINGOGRAM  2023    Blocked Fallopian Tubes    WA LAP,APPENDECTOMY N/A 02/15/2021    Procedure: APPENDECTOMY, LAPAROSCOPIC, Excision of epiploic appendagitis;  Surgeon: Bonnie Kaur MD;  Location: [facility] [place];  Service: General    TONSILLECTOMY         Family History   Problem Relation Age of Onset    Colon Cancer Mother     Cancer Father     Cervical Cancer Maternal Grandmother        Social History     Tobacco Use    Smoking status: Never     Passive exposure: Never    Smokeless tobacco: Never   Vaping Use    Vaping status: Never Used   Substance Use Topics    Alcohol use: No    Drug use: Never       hydrocortisone (ANUSOL-HC) 25 MG suppository  citalopram (CELEXA) 20 MG tablet  clonazePAM  "(KLONOPIN) 1 MG tablet  dicyclomine (BENTYL) 20 MG tablet  Lidocaine (LIDOCARE) 4 % Patch  lithium (ESKALITH) 300 MG tablet  methocarbamol (ROBAXIN) 500 MG tablet  norgestrel-ethinyl estradiol (LO/OVRAL) 0.3-30 MG-MCG tablet  omeprazole (PRILOSEC) 20 MG DR capsule  ondansetron (ZOFRAN ODT) 4 MG ODT tab  ondansetron (ZOFRAN ODT) 4 MG ODT tab  pregabalin (LYRICA) 75 MG capsule  tolterodine ER (DETROL LA) 2 MG 24 hr capsule  topiramate (TOPAMAX) 50 MG tablet        Physical Exam     Vitals:  /70   Pulse 88   Temp 97.7  F (36.5  C) (Oral)   Resp 14   Ht 1.626 m (5' 4\")   Wt 124.7 kg (275 lb)   LMP  (LMP Unknown)   SpO2 99%   BMI 47.20 kg/m      PHYSICAL EXAM:   Physical Exam  Vitals and nursing note reviewed.   Constitutional:       General: She is not in acute distress.     Appearance: Normal appearance.   HENT:      Head: Normocephalic and atraumatic.      Nose: Nose normal.      Mouth/Throat:      Mouth: Mucous membranes are moist.   Eyes:      Pupils: Pupils are equal, round, and reactive to light.   Cardiovascular:      Rate and Rhythm: Normal rate and regular rhythm.      Pulses: Normal pulses.           Radial pulses are 2+ on the right side and 2+ on the left side.        Dorsalis pedis pulses are 2+ on the right side and 2+ on the left side.   Pulmonary:      Effort: Pulmonary effort is normal. No respiratory distress.      Breath sounds: Normal breath sounds.   Abdominal:      Palpations: Abdomen is soft.      Tenderness: There is no abdominal tenderness.   Genitourinary:     Rectum: No mass or external hemorrhoid.      Comments: Female chaperone present for rectal exam.   Musculoskeletal:      Cervical back: Full passive range of motion without pain and neck supple.      Comments: No calf tenderness or swelling b/l   Skin:     General: Skin is warm.      Findings: No rash.   Neurological:      General: No focal deficit present.      Mental Status: She is alert. Mental status is at baseline.      " Comments: Fluent speech, no acute lateralizing deficits   Psychiatric:         Mood and Affect: Mood normal.         Behavior: Behavior normal.         Results     LAB:  All pertinent labs reviewed and interpreted  Labs Ordered and Resulted from Time of ED Arrival to Time of ED Departure   COMPREHENSIVE METABOLIC PANEL - Abnormal       Result Value    Sodium 138      Potassium 3.8      Carbon Dioxide (CO2) 25      Anion Gap 9      Urea Nitrogen 10.8      Creatinine 0.81      GFR Estimate >90      Calcium 8.4 (*)     Chloride 104      Glucose 95      Alkaline Phosphatase 89      AST 24      ALT 24      Protein Total 6.9      Albumin 3.9      Bilirubin Total 0.3     CBC WITH PLATELETS AND DIFFERENTIAL - Abnormal    WBC Count 9.6      RBC Count 4.55      Hemoglobin 10.3 (*)     Hematocrit 35.0      MCV 77 (*)     MCH 22.6 (*)     MCHC 29.4 (*)     RDW 17.0 (*)     Platelet Count 362      % Neutrophils 74      % Lymphocytes 20      % Monocytes 4      % Eosinophils 1      % Basophils 0      % Immature Granulocytes 0      NRBCs per 100 WBC 0      Absolute Neutrophils 7.1      Absolute Lymphocytes 1.9      Absolute Monocytes 0.4      Absolute Eosinophils 0.1      Absolute Basophils 0.0      Absolute Immature Granulocytes 0.0      Absolute NRBCs 0.0     INR - Normal    INR 1.01     TYPE AND SCREEN, ADULT    ABO/RH(D) O POS      Antibody Screen Negative      SPECIMEN EXPIRATION DATE [id number]     ABO/RH TYPE AND SCREEN       FINAL IMPRESSION:    ICD-10-CM    1. Blood in stool  K92.1       2. History of hemorrhoids  Z87.19           0 minutes of critical care time      I, Asiya Shea, am serving as a scribe to document services personally performed by Dr. Cricket Russ, based on my observations and the provider's statements to me. I, Cricket Russ, DO attest that Asiya Shea is acting in a scribe capacity, has observed my performance of the services and has documented them in accordance with my  direction.      Cricket Russ DO  Emergency Medicine  [facility] EMERGENCY DEPARTMENT  12/1/2024  2:24 PM          Cricket Russ MD  12/01/24 1935

## 2024-12-03 ENCOUNTER — PATIENT OUTREACH (OUTPATIENT)
Dept: CARE COORDINATION | Facility: CLINIC | Age: 29
End: 2024-12-03

## 2024-12-03 NOTE — LETTER
Juan R Uriostegui  [address] [place] [address]    Dear Juan R Uriostegui,      I am a team member within the [facility] with [facility]. I recently tried to reach you to ensure you were doing well following a recent visit within our health system. I also wanted to take this chance to introduce Clinic Care Coordination.     Below is a description of Clinic Care Coordination and how this team can further assist you:       The Clinic Care Coordination team is made up of a Registered Nurse, , Financial Resource Worker, and a Community Health Worker who understand and can help navigate the health care system. The goal of clinic care coordination is to help you manage your health, improve access to care, and achieve optimal health outcomes. They work alongside your provider to assist you in determining your health and social needs, obtain health care and community resources, and provide you with necessary information and education. Clinic Care Coordination can work with you through any barriers and develop a care plan that helps coordinate and strengthen the relationship between you and your care team.    If you wish to connect with the Clinic Care Coordination Team, please let your [facility] Primary Care Provider or Clinic Care Team know and they can place a referral. The Clinic Care Coordination team will then reach out by phone to further support you.    We are focused on providing you with the highest-quality healthcare experience possible.    Sincerely,   Your care team with [facility]'s [address] [place] [address] ([phone]).

## 2024-12-03 NOTE — PROGRESS NOTES
[facility] Resource [facility] Contact  [facility]/Voicemail     Clinical Data: Care Coordination ED-sourced Outreach-     Outreach attempted x 2.  Left message on patient's voicemail, providing [facility]'s 24/7 scheduling and nurse triage phone number [phone] ([phone]) for questions/concerns and/or to schedule an appt with an [facility] provider.      Care Coordination introduction letter with explanation of Clinic Care Coordination services sent to patient via [employer]. Clinic Care Coordination services remain available via referral if needed.    Plan: [facility] will do no further outreaches at this time.       Beata Fuller MA  [facility], [facility]    *Connected Care Resource Team does NOT follow patient ongoing. Referrals are identified based on internal discharge reports and the outreach is to ensure patient has an understanding of their discharge instructions.

## 2024-12-15 ENCOUNTER — APPOINTMENT (OUTPATIENT)
Dept: CT IMAGING | Facility: CLINIC | Age: 29
End: 2024-12-15
Attending: EMERGENCY MEDICINE

## 2024-12-15 ENCOUNTER — HOSPITAL ENCOUNTER (EMERGENCY)
Facility: CLINIC | Age: 29
Discharge: HOME | End: 2024-12-15
Attending: EMERGENCY MEDICINE | Admitting: EMERGENCY MEDICINE

## 2024-12-15 VITALS
BODY MASS INDEX: 42.68 KG/M2 | TEMPERATURE: 98.9 F | WEIGHT: 250 LBS | HEIGHT: 64 IN | HEART RATE: 98 BPM | OXYGEN SATURATION: 98 % | RESPIRATION RATE: 20 BRPM | SYSTOLIC BLOOD PRESSURE: 103 MMHG | DIASTOLIC BLOOD PRESSURE: 59 MMHG

## 2024-12-15 DIAGNOSIS — L03.90 CELLULITIS, UNSPECIFIED CELLULITIS SITE: ICD-10-CM

## 2024-12-15 LAB
ALBUMIN SERPL BCG-MCNC: 4 G/DL (ref 3.5–5.2)
ALP SERPL-CCNC: 85 U/L (ref 40–150)
ALT SERPL W P-5'-P-CCNC: 14 U/L (ref 0–50)
ANION GAP SERPL CALCULATED.3IONS-SCNC: 12 MMOL/L (ref 7–15)
AST SERPL W P-5'-P-CCNC: 16 U/L (ref 0–45)
BASOPHILS # BLD AUTO: 0.1 10E3/UL (ref 0–0.2)
BASOPHILS NFR BLD AUTO: 0 %
BILIRUB SERPL-MCNC: 0.2 MG/DL
BUN SERPL-MCNC: 7.6 MG/DL (ref 6–20)
CALCIUM SERPL-MCNC: 8.8 MG/DL (ref 8.8–10.4)
CHLORIDE SERPL-SCNC: 105 MMOL/L (ref 98–107)
CREAT SERPL-MCNC: 0.97 MG/DL (ref 0.51–0.95)
CRP SERPL-MCNC: 40.8 MG/L
EGFRCR SERPLBLD CKD-EPI 2021: 81 ML/MIN/1.73M2
EOSINOPHIL # BLD AUTO: 0.1 10E3/UL (ref 0–0.7)
EOSINOPHIL NFR BLD AUTO: 0 %
ERYTHROCYTE [DISTWIDTH] IN BLOOD BY AUTOMATED COUNT: 17.7 % (ref 10–15)
GLUCOSE SERPL-MCNC: 105 MG/DL (ref 70–99)
HCO3 SERPL-SCNC: 21 MMOL/L (ref 22–29)
HCT VFR BLD AUTO: 33.2 % (ref 35–47)
HGB BLD-MCNC: 10.4 G/DL (ref 11.7–15.7)
HOLD SPECIMEN: NORMAL
HOLD SPECIMEN: NORMAL
IMM GRANULOCYTES # BLD: 0.1 10E3/UL
IMM GRANULOCYTES NFR BLD: 0 %
LACTATE SERPL-SCNC: 0.9 MMOL/L (ref 0.7–2)
LYMPHOCYTES # BLD AUTO: 2.4 10E3/UL (ref 0.8–5.3)
LYMPHOCYTES NFR BLD AUTO: 14 %
MCH RBC QN AUTO: 23 PG (ref 26.5–33)
MCHC RBC AUTO-ENTMCNC: 31.3 G/DL (ref 31.5–36.5)
MCV RBC AUTO: 73 FL (ref 78–100)
MONOCYTES # BLD AUTO: 1 10E3/UL (ref 0–1.3)
MONOCYTES NFR BLD AUTO: 6 %
NEUTROPHILS # BLD AUTO: 13.5 10E3/UL (ref 1.6–8.3)
NEUTROPHILS NFR BLD AUTO: 79 %
NRBC # BLD AUTO: 0 10E3/UL
NRBC BLD AUTO-RTO: 0 /100
PLATELET # BLD AUTO: 362 10E3/UL (ref 150–450)
POTASSIUM SERPL-SCNC: 3.4 MMOL/L (ref 3.4–5.3)
PROT SERPL-MCNC: 7.3 G/DL (ref 6.4–8.3)
RBC # BLD AUTO: 4.53 10E6/UL (ref 3.8–5.2)
SODIUM SERPL-SCNC: 138 MMOL/L (ref 135–145)
WBC # BLD AUTO: 17.1 10E3/UL (ref 4–11)

## 2024-12-15 PROCEDURE — 36415 COLL VENOUS BLD VENIPUNCTURE: CPT | Performed by: EMERGENCY MEDICINE

## 2024-12-15 PROCEDURE — 96375 TX/PRO/DX INJ NEW DRUG ADDON: CPT

## 2024-12-15 PROCEDURE — 82374 ASSAY BLOOD CARBON DIOXIDE: CPT | Performed by: EMERGENCY MEDICINE

## 2024-12-15 PROCEDURE — 74177 CT ABD & PELVIS W/CONTRAST: CPT

## 2024-12-15 PROCEDURE — 250N000011 HC RX IP 250 OP 636: Performed by: EMERGENCY MEDICINE

## 2024-12-15 PROCEDURE — 83605 ASSAY OF LACTIC ACID: CPT | Performed by: EMERGENCY MEDICINE

## 2024-12-15 PROCEDURE — 80051 ELECTROLYTE PANEL: CPT | Performed by: EMERGENCY MEDICINE

## 2024-12-15 PROCEDURE — 96374 THER/PROPH/DIAG INJ IV PUSH: CPT | Mod: 59

## 2024-12-15 PROCEDURE — 250N000013 HC RX MED GY IP 250 OP 250 PS 637: Performed by: EMERGENCY MEDICINE

## 2024-12-15 PROCEDURE — 82247 BILIRUBIN TOTAL: CPT | Performed by: EMERGENCY MEDICINE

## 2024-12-15 PROCEDURE — 86140 C-REACTIVE PROTEIN: CPT | Performed by: EMERGENCY MEDICINE

## 2024-12-15 PROCEDURE — 99285 EMERGENCY DEPT VISIT HI MDM: CPT | Mod: 25

## 2024-12-15 PROCEDURE — 85025 COMPLETE CBC W/AUTO DIFF WBC: CPT | Performed by: EMERGENCY MEDICINE

## 2024-12-15 RX ORDER — LIDOCAINE 50 MG/G
1 PATCH TOPICAL EVERY 24 HOURS
Qty: 10 PATCH | Refills: 0 | Status: SHIPPED | OUTPATIENT
Start: 2024-12-15 | End: 2024-12-25

## 2024-12-15 RX ORDER — SULFAMETHOXAZOLE AND TRIMETHOPRIM 800; 160 MG/1; MG/1
1 TABLET ORAL 2 TIMES DAILY
Qty: 20 TABLET | Refills: 0 | Status: SHIPPED | OUTPATIENT
Start: 2024-12-15 | End: 2024-12-25

## 2024-12-15 RX ORDER — HYDROMORPHONE HYDROCHLORIDE 1 MG/ML
0.5 INJECTION, SOLUTION INTRAMUSCULAR; INTRAVENOUS; SUBCUTANEOUS ONCE
Status: COMPLETED | OUTPATIENT
Start: 2024-12-15 | End: 2024-12-15

## 2024-12-15 RX ORDER — LIDOCAINE 4 G/G
1 PATCH TOPICAL
Status: DISCONTINUED | OUTPATIENT
Start: 2024-12-16 | End: 2024-12-15

## 2024-12-15 RX ORDER — OXYCODONE HYDROCHLORIDE 5 MG/1
5 TABLET ORAL EVERY 6 HOURS PRN
Qty: 12 TABLET | Refills: 0 | Status: SHIPPED | OUTPATIENT
Start: 2024-12-15 | End: 2024-12-18

## 2024-12-15 RX ORDER — IOPAMIDOL 755 MG/ML
100 INJECTION, SOLUTION INTRAVASCULAR ONCE
Status: COMPLETED | OUTPATIENT
Start: 2024-12-15 | End: 2024-12-15

## 2024-12-15 RX ORDER — CEPHALEXIN 500 MG/1
500 CAPSULE ORAL ONCE
Status: COMPLETED | OUTPATIENT
Start: 2024-12-15 | End: 2024-12-15

## 2024-12-15 RX ORDER — KETOROLAC TROMETHAMINE 30 MG/ML
30 INJECTION, SOLUTION INTRAMUSCULAR; INTRAVENOUS ONCE
Status: COMPLETED | OUTPATIENT
Start: 2024-12-15 | End: 2024-12-15

## 2024-12-15 RX ORDER — CEPHALEXIN 500 MG/1
500 CAPSULE ORAL 3 TIMES DAILY
Qty: 30 CAPSULE | Refills: 0 | Status: SHIPPED | OUTPATIENT
Start: 2024-12-15 | End: 2024-12-25

## 2024-12-15 RX ORDER — SULFAMETHOXAZOLE AND TRIMETHOPRIM 800; 160 MG/1; MG/1
1 TABLET ORAL 2 TIMES DAILY
Status: DISCONTINUED | OUTPATIENT
Start: 2024-12-15 | End: 2024-12-16 | Stop reason: HOSPADM

## 2024-12-15 RX ORDER — LIDOCAINE 4 G/G
1 PATCH TOPICAL
Status: DISCONTINUED | OUTPATIENT
Start: 2024-12-15 | End: 2024-12-16 | Stop reason: HOSPADM

## 2024-12-15 RX ADMIN — SULFAMETHOXAZOLE AND TRIMETHOPRIM 1 TABLET: 800; 160 TABLET ORAL at 21:53

## 2024-12-15 RX ADMIN — CEPHALEXIN 500 MG: 500 CAPSULE ORAL at 21:53

## 2024-12-15 RX ADMIN — HYDROMORPHONE HYDROCHLORIDE 0.5 MG: 1 INJECTION, SOLUTION INTRAMUSCULAR; INTRAVENOUS; SUBCUTANEOUS at 22:46

## 2024-12-15 RX ADMIN — KETOROLAC TROMETHAMINE 30 MG: 30 INJECTION, SOLUTION INTRAMUSCULAR at 21:33

## 2024-12-15 RX ADMIN — IOPAMIDOL 100 ML: 755 INJECTION, SOLUTION INTRAVENOUS at 22:11

## 2024-12-15 RX ADMIN — LIDOCAINE 1 PATCH: 4 PATCH TOPICAL at 22:02

## 2024-12-15 ASSESSMENT — COLUMBIA-SUICIDE SEVERITY RATING SCALE - C-SSRS

## 2024-12-15 ASSESSMENT — ACTIVITIES OF DAILY LIVING (ADL)
ADLS_ACUITY_SCORE: 41

## 2024-12-15 ASSESSMENT — ENCOUNTER SYMPTOMS: RECTAL PAIN: 1

## 2024-12-15 NOTE — LETTER
December 15, 2024      To Whom It May Concern:      Juan R Uriostegui was seen in our Emergency Department today, 12/15/24.  I expect her condition to improve over the next 4 days.  She may return to work/school when improved. For the next several days, Juan R may not be able to sit for long periods. Please make accommodations as able.    Sincerely,        Alejandro Osuna RN

## 2024-12-15 NOTE — LETTER
December 15, 2024      To Whom It May Concern:      Juan R Uriostegui was seen in our Emergency Department today, 12/15/24.  I expect her condition to improve over the next 4 days.  She may return to work/school when improved. For the next several days, Juan R may not be able to sit for long periods of time, please make accommodations as able.    Sincerely,        Alejandro Osuna RN

## 2024-12-16 NOTE — DISCHARGE INSTRUCTIONS
Take antibiotics as prescribed.  You can place the lidocaine patch, alternate Tylenol and ibuprofen for pain control.  Take oxycodone for breakthrough pain.  Follow-up with your primary care doctor early this week for recheck.

## 2024-12-16 NOTE — ED TRIAGE NOTES
"Pt with \"either a cyst or polyp or something\" causing 10/10 rectal pain starting yesterday; tylenol PTA without improvement. No trauma, no change to bowel or bladder; did have some diarrhea earlier but has h/o IBS.    Tearful and unable to sit in triage.     Triage Assessment (Adult)       Row Name 12/15/24 1927          Triage Assessment    Airway WDL WDL        Respiratory WDL    Respiratory WDL WDL        Skin Circulation/Temperature WDL    Skin Circulation/Temperature WDL WDL        Cardiac WDL    Cardiac WDL WDL        Peripheral/Neurovascular WDL    Peripheral Neurovascular WDL WDL        Cognitive/Neuro/Behavioral WDL    Cognitive/Neuro/Behavioral WDL WDL                     "

## 2024-12-17 ENCOUNTER — PATIENT OUTREACH (OUTPATIENT)
Dept: CARE COORDINATION | Facility: CLINIC | Age: 29
End: 2024-12-17

## 2024-12-17 NOTE — LETTER
Juan R Uriostegui  [address] [place] [address]    Dear Juan R Uriostegui,      I am a team member within the [facility] Resource Center with [facility]. I recently tried to reach you to ensure you were doing well following a recent visit within our health system. I also wanted to take this chance to introduce Clinic Care Coordination.     Below is a description of Clinic Care Coordination and how this team can further assist you:       The Clinic Care Coordination team is made up of a Registered Nurse, , Financial Resource Worker, and a Community Health Worker who understand and can help navigate the health care system. The goal of clinic care coordination is to help you manage your health, improve access to care, and achieve optimal health outcomes. They work alongside your provider to assist you in determining your health and social needs, obtain health care and community resources, and provide you with necessary information and education. Clinic Care Coordination can work with you through any barriers and develop a care plan that helps coordinate and strengthen the relationship between you and your care team.    If you wish to connect with the Clinic Care Coordination Team, please let your [facility] Primary Care Provider or Clinic Care Team know and they can place a referral. The Clinic Care Coordination team will then reach out by phone to further support you.    We are focused on providing you with the highest-quality healthcare experience possible.    Sincerely,   Your care team with [facility]'s [address] [place] [address] ([phone]).

## 2024-12-17 NOTE — PROGRESS NOTES
[facility] Care Resource Center Contact  [facility]/Voicemail     Clinical Data: Care Coordination ED-sourced Outreach-     Outreach attempted x 2.  Left message on patient's voicemail, providing [facility]'s 24/7 scheduling and nurse triage phone number [phone] ([phone]) for questions/concerns and/or to schedule an appt with an [facility] provider.      Care Coordination introduction letter with explanation of Clinic Care Coordination services sent to patient via [employer]. Clinic Care Coordination services remain available via referral if needed.    Plan: [facility] will do no further outreaches at this time.       DELORES Mancilla  Connected Care Resource [place], [facility]    *Connected Care Resource Team does NOT follow patient ongoing. Referrals are identified based on internal discharge reports and the outreach is to ensure patient has an understanding of their discharge instructions.

## 2024-12-18 ENCOUNTER — PATIENT OUTREACH (OUTPATIENT)
Dept: CARE COORDINATION | Facility: CLINIC | Age: 29
End: 2024-12-18

## 2024-12-18 NOTE — PROGRESS NOTES
[facility]    Background: Primary Care-Care Coordination initial outreach identified per system criteria and reviewed by [facility] Resource [facility] team.    Assessment: Upon chart review, [facility] Team member will not proceed with patient outreach related to this episode of Primary Care-Care Coordination program due to reason below:    Duplicate referral was placed. Closed out referral.     Plan: Primary Care-Care Coordination episode addressed appropriately per reason noted above.      Beata Fuller MA  [facility], [facility]    *Connected Care Resource Team does NOT follow patient ongoing. Referrals are identified based on internal discharge reports and the outreach is to ensure patient has an understanding of their discharge instructions.

## 2025-03-18 ENCOUNTER — TELEPHONE (OUTPATIENT)
Dept: FAMILY MEDICINE | Facility: CLINIC | Age: 30
End: 2025-03-18

## 2025-05-13 ENCOUNTER — TELEPHONE (OUTPATIENT)
Dept: FAMILY MEDICINE | Facility: CLINIC | Age: 30
End: 2025-05-13

## 2025-05-13 NOTE — TELEPHONE ENCOUNTER
Patient Quality Outreach    Patient is due for the following:   Cervical Cancer Screening - PAP Needed  Physical Preventive Adult Physical      Topic Date Due    COVID-19 Vaccine (3 - 2024-25 season) 09/01/2024       Action(s) Taken:   Schedule a Adult Preventative    Type of outreach:    Phone, left message for patient/parent to call back.    Questions for provider review:    None         Judy Hernandez MA  Chart routed to None.

## 2025-06-04 ENCOUNTER — HOSPITAL ENCOUNTER (EMERGENCY)
Facility: HOSPITAL | Age: 30
Discharge: HOME | End: 2025-06-04
Admitting: PHYSICIAN ASSISTANT
Payer: MEDICAID

## 2025-06-04 VITALS
TEMPERATURE: 98 F | DIASTOLIC BLOOD PRESSURE: 62 MMHG | SYSTOLIC BLOOD PRESSURE: 118 MMHG | WEIGHT: 250 LBS | BODY MASS INDEX: 42.91 KG/M2 | OXYGEN SATURATION: 99 % | RESPIRATION RATE: 16 BRPM | HEART RATE: 70 BPM

## 2025-06-04 DIAGNOSIS — G89.18 POST-OP PAIN: ICD-10-CM

## 2025-06-04 DIAGNOSIS — N93.9 VAGINAL BLEEDING: ICD-10-CM

## 2025-06-04 LAB
ALBUMIN SERPL BCG-MCNC: 3.7 G/DL (ref 3.5–5.2)
ALBUMIN UR-MCNC: 70 MG/DL
ALP SERPL-CCNC: 85 U/L (ref 40–150)
ALT SERPL W P-5'-P-CCNC: 18 U/L (ref 0–50)
ANION GAP SERPL CALCULATED.3IONS-SCNC: 8 MMOL/L (ref 7–15)
APPEARANCE UR: ABNORMAL
AST SERPL W P-5'-P-CCNC: 14 U/L (ref 0–45)
BACTERIA #/AREA URNS HPF: ABNORMAL /HPF
BASOPHILS # BLD AUTO: 0 10E3/UL (ref 0–0.2)
BASOPHILS NFR BLD AUTO: 1 %
BILIRUB DIRECT SERPL-MCNC: <0.08 MG/DL (ref 0–0.3)
BILIRUB SERPL-MCNC: 0.3 MG/DL
BILIRUB UR QL STRIP: NEGATIVE
BUN SERPL-MCNC: 7.8 MG/DL (ref 6–20)
CALCIUM SERPL-MCNC: 8.9 MG/DL (ref 8.8–10.4)
CHLORIDE SERPL-SCNC: 102 MMOL/L (ref 98–107)
COLOR UR AUTO: ABNORMAL
CREAT SERPL-MCNC: 0.73 MG/DL (ref 0.51–0.95)
EGFRCR SERPLBLD CKD-EPI 2021: >90 ML/MIN/1.73M2
EOSINOPHIL # BLD AUTO: 0.1 10E3/UL (ref 0–0.7)
EOSINOPHIL NFR BLD AUTO: 1 %
ERYTHROCYTE [DISTWIDTH] IN BLOOD BY AUTOMATED COUNT: 18.6 % (ref 10–15)
GLUCOSE SERPL-MCNC: 95 MG/DL (ref 70–99)
GLUCOSE UR STRIP-MCNC: NEGATIVE MG/DL
HCO3 SERPL-SCNC: 25 MMOL/L (ref 22–29)
HCT VFR BLD AUTO: 37.6 % (ref 35–47)
HGB BLD-MCNC: 11.7 G/DL (ref 11.7–15.7)
HGB UR QL STRIP: ABNORMAL
IMM GRANULOCYTES # BLD: 0 10E3/UL
IMM GRANULOCYTES NFR BLD: 0 %
KETONES UR STRIP-MCNC: NEGATIVE MG/DL
LEUKOCYTE ESTERASE UR QL STRIP: ABNORMAL
LIPASE SERPL-CCNC: 20 U/L (ref 13–60)
LYMPHOCYTES # BLD AUTO: 2.1 10E3/UL (ref 0.8–5.3)
LYMPHOCYTES NFR BLD AUTO: 31 %
MCH RBC QN AUTO: 23.6 PG (ref 26.5–33)
MCHC RBC AUTO-ENTMCNC: 31.1 G/DL (ref 31.5–36.5)
MCV RBC AUTO: 76 FL (ref 78–100)
MONOCYTES # BLD AUTO: 0.3 10E3/UL (ref 0–1.3)
MONOCYTES NFR BLD AUTO: 5 %
NEUTROPHILS # BLD AUTO: 4.4 10E3/UL (ref 1.6–8.3)
NEUTROPHILS NFR BLD AUTO: 63 %
NITRATE UR QL: NEGATIVE
NRBC # BLD AUTO: 0 10E3/UL
NRBC BLD AUTO-RTO: 0 /100
PH UR STRIP: 5.5 [PH] (ref 5–7)
PLATELET # BLD AUTO: 333 10E3/UL (ref 150–450)
POTASSIUM SERPL-SCNC: 4 MMOL/L (ref 3.4–5.3)
PROT SERPL-MCNC: 7 G/DL (ref 6.4–8.3)
RBC # BLD AUTO: 4.95 10E6/UL (ref 3.8–5.2)
RBC URINE: >182 /HPF
SODIUM SERPL-SCNC: 135 MMOL/L (ref 135–145)
SP GR UR STRIP: 1.02 (ref 1–1.03)
SQUAMOUS EPITHELIAL: 7 /HPF
UROBILINOGEN UR STRIP-MCNC: NORMAL MG/DL
WBC # BLD AUTO: 7 10E3/UL (ref 4–11)
WBC URINE: 19 /HPF

## 2025-06-04 PROCEDURE — 85025 COMPLETE CBC W/AUTO DIFF WBC: CPT | Performed by: PHYSICIAN ASSISTANT

## 2025-06-04 PROCEDURE — 83690 ASSAY OF LIPASE: CPT | Performed by: PHYSICIAN ASSISTANT

## 2025-06-04 PROCEDURE — 99284 EMERGENCY DEPT VISIT MOD MDM: CPT | Mod: 25 | Performed by: PHYSICIAN ASSISTANT

## 2025-06-04 PROCEDURE — 82248 BILIRUBIN DIRECT: CPT | Performed by: PHYSICIAN ASSISTANT

## 2025-06-04 PROCEDURE — 80053 COMPREHEN METABOLIC PANEL: CPT | Performed by: PHYSICIAN ASSISTANT

## 2025-06-04 PROCEDURE — 36415 COLL VENOUS BLD VENIPUNCTURE: CPT | Performed by: PHYSICIAN ASSISTANT

## 2025-06-04 PROCEDURE — 250N000013 HC RX MED GY IP 250 OP 250 PS 637: Performed by: PHYSICIAN ASSISTANT

## 2025-06-04 PROCEDURE — 87086 URINE CULTURE/COLONY COUNT: CPT | Performed by: PHYSICIAN ASSISTANT

## 2025-06-04 PROCEDURE — 250N000011 HC RX IP 250 OP 636: Performed by: PHYSICIAN ASSISTANT

## 2025-06-04 PROCEDURE — 81001 URINALYSIS AUTO W/SCOPE: CPT | Performed by: PHYSICIAN ASSISTANT

## 2025-06-04 RX ORDER — OXYCODONE HYDROCHLORIDE 5 MG/1
5 TABLET ORAL EVERY 6 HOURS PRN
Qty: 6 TABLET | Refills: 0 | Status: SHIPPED | OUTPATIENT
Start: 2025-06-04 | End: 2025-06-07

## 2025-06-04 RX ORDER — OXYCODONE HYDROCHLORIDE 5 MG/1
5 TABLET ORAL ONCE
Refills: 0 | Status: COMPLETED | OUTPATIENT
Start: 2025-06-04 | End: 2025-06-04

## 2025-06-04 RX ORDER — ONDANSETRON 4 MG/1
4 TABLET, ORALLY DISINTEGRATING ORAL ONCE
Status: COMPLETED | OUTPATIENT
Start: 2025-06-04 | End: 2025-06-04

## 2025-06-04 RX ADMIN — ONDANSETRON 4 MG: 4 TABLET, ORALLY DISINTEGRATING ORAL at 09:58

## 2025-06-04 RX ADMIN — OXYCODONE HYDROCHLORIDE 5 MG: 5 TABLET ORAL at 11:38

## 2025-06-04 RX ADMIN — OXYCODONE HYDROCHLORIDE 5 MG: 5 TABLET ORAL at 09:58

## 2025-06-04 ASSESSMENT — COLUMBIA-SUICIDE SEVERITY RATING SCALE - C-SSRS
6. HAVE YOU EVER DONE ANYTHING, STARTED TO DO ANYTHING, OR PREPARED TO DO ANYTHING TO END YOUR LIFE?: NO
1. IN THE PAST MONTH, HAVE YOU WISHED YOU WERE DEAD OR WISHED YOU COULD GO TO SLEEP AND NOT WAKE UP?: NO
2. HAVE YOU ACTUALLY HAD ANY THOUGHTS OF KILLING YOURSELF IN THE PAST MONTH?: NO

## 2025-06-04 ASSESSMENT — ACTIVITIES OF DAILY LIVING (ADL)
ADLS_ACUITY_SCORE: 41

## 2025-06-04 NOTE — DISCHARGE INSTRUCTIONS
You are seen here in the emergency department for evaluation of ongoing abdominal cramping, vaginal bleeding.  We discussed potentially obtaining a CT scan, if your symptoms persist or you develop fevers or significant worsening, I would recommend return to the emergency department for reassessment.  Ultimately, I would recommend outpatient follow-up with your OB/GYN group, preferably with the ones that did your procedure, however the number for [facility] is included above.  Use ibuprofen or Tylenol at home for pain first, and then additional pain medications as needed.  Return to the ER with changes    For pain or fever you may use:  -Tylenol 650 mg every 6 hours.  Max 4000 mg in 24 hours  Do not use thismedication with alcohol as it can cause liver problems.  -Ibuprofen 600 mg every 6 hours.  Max 3500 mg in 24 hours  Do not take this medication if you have a history of a GI bleed or have kidney problems.  You may use both of these medications at the same time or you can alternate them every 3 hours.  For example, Tylenol at 6 AM, ibuprofen at 9 AM, Tylenol at noon, etc.

## 2025-06-04 NOTE — ED PROVIDER NOTES
EMERGENCY DEPARTMENT ENCOUNTER      NAME: Juan R Uriostegui  AGE: 30 year old female  YOB: 1995  MRN: [id number]  EVALUATION DATE & TIME: No admission date for patient encounter.    PCP: [facility]    ED PROVIDER: Jorge Alberto Subramanian PA-C      Chief Complaint   Patient presents with    Abdominal Pain    Vaginal Bleeding       FINAL IMPRESSION:  1. Post-op pain    2. Vaginal bleeding        ED COURSE & MEDICAL DECISION MAKING:    Pertinent Labs & Imaging studies reviewed. (See chart for details)  9:05 AM I met the patient and performed my initial interview and exam.  11:11 AM Patient updated.   3:07 PM Unit later here in the emergency department reports that outpatient OB/GYN from [facility] was paged and called back and reported she will not respond to pages despite this being a postop patient.  Will page our local OB/GYN regarding recommendations.  3:10 PM Spoke with Dr. Manan BUCKLEY   3:22 PM Patient updated.        30 year old female presents to the Emergency Department for evaluation of abdominal pain, vaginal bleeding.     ED Course as of 06/04/25 1525   Wed Jun 04, 2025   0920 Patient is a 30-year-old female, presents emergency department for evaluation of abdominal cramping, vaginal bleeding.  Patient had a D&C at [facility] on 06/2/2025, presents emergency department here at [facility] for ongoing vaginal bleeding and pain.  Patient has been alternating Tylenol, oxycodone and hydroxyzine at home for pain.  She denies any fevers.  Does feel somewhat nauseous.  Pain is primarily lower in the abdomen.  She reports that she is going through a pad every 1-2 hours.  On arrival here, she is vitally stable, not febrile tachycardic tachypneic.  She is not hypoxic.    Differential considered here in the emergency department, will obtain basic labs including CBC, BMP, hepatic function, lipase, urinalysis.  Will undergo ultrasound here for better visualization.  Patient  will give dose of oxycodone, as well as Zofran here in the emergency department.  She is agreeable with this plan.  Plan for labs, ultrasound, likely discussion with OB/GYN group at [facility] pending findings.   1047     No significant leukocytosis or anemia here, urinalysis here shows some blood, minimal amount of bacteria, however does appear to be contaminated.   1300   I have independently reviewed the ultrasound of the pelvis, I do not appreciate any acute abnormalities, pending final radiology read.     1301 Pelvis US, complete  MPRESSION: Normal-appearing endometrium without evidence of internal flow by color Doppler to suggest retained products.   1413     Still pending return call from [facility] OB[facility]GY[facility], [facility] has repaged multiple times regarding additional recommendations.   1522     Spoke with OB/GYN locally here after [facility] would not respond to phone calls here, patient has no other infectious findings, discussed potentially obtaining a CT scan.    Using shared decision making, patient is feeling somewhat improved here in the emergency department after a couple doses of pain medicine, I did offer CT scan, however she would prefer to defer at this time.  I think this is reasonable given her labs here are stable and she does not appear significantly anemic.  I discussed return precautions with her and she expresses understanding.  Discussed that she needs to follow-up with somebody as an outpatient if symptoms continue.  Plan for discharge at this time.  Ultimately, patient is stable and fairly comfortable here, doubt significant intra-abdominal infection, and do think it is reasonable to forego CT scan at this time with close outpatient follow-up.     Medical Decision Making  I reviewed the EMR: Outpatient Record: Surgical D&C on 06/2/2025, outpatient OB/GYN call from today  Discharge. I prescribed additional prescription strength medication(s) as charted. I considered admission, but discharged  the patient after share decision making conversation.    MIPS (CTPE, Dental pain, Escoto, Sinusitis, Asthma/COPD, Head Trauma): Not Applicable    SEPSIS: None        At the conclusion of the encounter I discussed the results of all of the tests and the disposition. The questions were answered. The patient or family acknowledged understanding and was agreeable with the care plan.       0 minutes of critical care time     MEDICATIONS GIVEN IN THE EMERGENCY:  Medications   ondansetron (ZOFRAN ODT) ODT tab 4 mg (4 mg Oral $Given 6/4/25 0942)   oxyCODONE (ROXICODONE) tablet 5 mg (5 mg Oral $Given 6/4/25 0958)   oxyCODONE (ROXICODONE) tablet 5 mg (5 mg Oral $Given 6/4/25 1138)       NEW PRESCRIPTIONS STARTED AT TODAY'S ER VISIT  New Prescriptions    OXYCODONE (ROXICODONE) 5 MG TABLET    Take 1 tablet (5 mg) by mouth every 6 hours as needed for severe pain.          =================================================================    HPI    Patient information was obtained from: Patient     Use of : N/A        Juan R SOWMYA Uriostegui is a 30 year old female with a pertinent history of irritable bowel syndrome, pelvic floor dysfunction, anal pain, bipolar, obesity, generalized anxiety, who presents to this ED for evaluation of Cramping.  Patient had a D&C on 06/2, see below.  Complaining of ongoing cramping, abdominal pain.  No fevers.  Still having ongoing bleeding.  Called the OB/GYN triage line and was directed into the emergency department.    Per Chart Review from 06/02/2025: Patient was seen as she was requesting a physician. Uncomfortable states that she is having burning inside her vagina. Willing to allow me to peer at the outside. Ask patient if I can insert a digit into her vagina and patient stated extreme discomfort with even light palpation to hymenal ring. No evidence of any erythema and no residual Betadine visualized. Very minimal bleeding evident. Very unhappy with her care here and demanding to leave  to go to another hospital. I encouraged her consider sharing if she feels like she is having a reaction. Visualization of the rest of the residual Betadine on her lower abdomen shows no erythema or evidence of any reaction. I did tell the patient that the her vagina was copiously irrigated and thus there she would be no residual iodine. In the surgery we attempted to clean her skin externally and patient was out of control on the bed.    Long discussion with the mother regarding the events during the surgery as well as patient's postoperative behavior and dissatisfaction. Patient has been attended to by the nurse. Has been asking for narcotics in the clinic thus recommended evacuation of her thick endometrium hoping to alleviate her discomfort. I did tell her mother that I am uncertain what her degree of discomfort and obtaining a 2nd opinion would be a great idea as well as being seen by primary care. Her mother states that she has not been any will to get her medications for IBS.     I reunited the patient with her mother and patient was extremely rude to her mother. Expressed dissatisfaction in her desire to leave. Was going to get fentanyl as well as Vistaril as her nurses at the bedside.    Prior to surgery there was a discussion with the bedside with CRNA team regarding her document to Toradol allergy which sounds like she had received an injection in the ER and had a local reaction. We all decided collectively to not give her Toradol despite it being a good medication for this procedure. She did get intravenous Tylenol in the operating room as well as fentanyl.     PAST MEDICAL HISTORY:  Past Medical History:   Diagnosis Date    Asthma     Bipolar 1 disorder (H)     Depression     IBS (irritable bowel syndrome)     Obesity        PAST SURGICAL HISTORY:  Past Surgical History:   Procedure Laterality Date    HYSTEROSALPINGOGRAM  2023    Blocked Fallopian Tubes    NC LAP,APPENDECTOMY N/A 02/15/2021    Procedure:  APPENDECTOMY, LAPAROSCOPIC, Excision of epiploic appendagitis;  Surgeon: Bonnie Kaur MD;  Location: [facility];  Service: General    TONSILLECTOMY             CURRENT MEDICATIONS:    oxyCODONE (ROXICODONE) 5 MG tablet  citalopram (CELEXA) 20 MG tablet  clonazePAM (KLONOPIN) 1 MG tablet  dicyclomine (BENTYL) 20 MG tablet  hydrocortisone (ANUSOL-HC) 25 MG suppository  Lidocaine (LIDOCARE) 4 % Patch  lithium (ESKALITH) 300 MG tablet  methocarbamol (ROBAXIN) 500 MG tablet  norgestrel-ethinyl estradiol (LO/OVRAL) 0.3-30 MG-MCG tablet  omeprazole (PRILOSEC) 20 MG DR capsule  ondansetron (ZOFRAN ODT) 4 MG ODT tab  ondansetron (ZOFRAN ODT) 4 MG ODT tab  pregabalin (LYRICA) 75 MG capsule  tolterodine ER (DETROL LA) 2 MG 24 hr capsule  topiramate (TOPAMAX) 50 MG tablet         ALLERGIES:  Allergies   Allergen Reactions    Toradol [Ketorolac Tromethamine] Rash    Vancomycin Rash       FAMILY HISTORY:  Family History   Problem Relation Age of Onset    Colon Cancer Mother     Cancer Father     Cervical Cancer Maternal Grandmother        SOCIAL HISTORY:   Social History     Socioeconomic History    Marital status: Single    Number of children: 1   Tobacco Use    Smoking status: Never     Passive exposure: Never    Smokeless tobacco: Never   Vaping Use    Vaping status: Never Used   Substance and Sexual Activity    Alcohol use: No    Drug use: Never    Sexual activity: Yes     Partners: Male   Social History Narrative    Works at a nursing home.  Has 1 3-year-old son.     Social Drivers of Health     Financial Resource Strain: Low Risk  (9/20/2023)    Financial Resource Strain     Within the past 12 months, have you or your family members you live with been unable to get utilities (heat, electricity) when it was really needed?: No   Food Insecurity: No Food Insecurity (5/9/2025)    Received from [facility]    Hunger Vital Sign     Worried About Running Out of Food in the Last Year: Never true     Ran Out of  Food in the Last Year: Never true   Transportation Needs: No Transportation Needs (5/9/2025)    [facility]    PRAPARE - Transportation     Lack of Transportation (Medical): No     Lack of Transportation (Non-Medical): No   Interpersonal Safety: Not At Risk (5/9/2025)    [facility]    Humiliation, Afraid, Rape, and Kick questionnaire     Fear of Current or Ex-Partner: No     Emotionally Abused: No     Physically Abused: No     Sexually Abused: No   Housing Stability: Unknown (5/9/2025)    [facility]    Housing Stability Vital Sign     Unable to Pay for Housing in the Last Year: No     Homeless in the Last Year: No       VITALS:  /61   Pulse 74   Temp 97.5  F (36.4  C) (Temporal)   Resp 18   Wt 113.4 kg (250 lb)   SpO2 99%   BMI 42.91 kg/m      PHYSICAL EXAM    Physical Exam  Vitals and nursing note reviewed.   Constitutional:       General: She is not in acute distress.     Appearance: Normal appearance. She is normal weight. She is not toxic-appearing or diaphoretic.   HENT:      Right Ear: External ear normal.      Left Ear: External ear normal.   Eyes:      Conjunctiva/sclera: Conjunctivae normal.   Cardiovascular:      Rate and Rhythm: Normal rate and regular rhythm.      Heart sounds: Normal heart sounds.   Pulmonary:      Effort: Pulmonary effort is normal.   Abdominal:      General: Abdomen is flat. Bowel sounds are normal. There is no distension.      Palpations: Abdomen is soft.      Tenderness: There is abdominal tenderness in the suprapubic area. There is no right CVA tenderness, left CVA tenderness, guarding or rebound.   Neurological:      Mental Status: She is alert. Mental status is at baseline.           LAB:  All pertinent labs reviewed and interpreted.  Labs Ordered and Resulted from Time of ED Arrival to Time of ED Departure   ROUTINE UA WITH MICROSCOPIC REFLEX TO CULTURE - Abnormal       Result Value    Color Urine Brown (*)      Appearance Urine Turbid (*)     Glucose Urine Negative      Bilirubin Urine Negative      Ketones Urine Negative      Specific Gravity Urine 1.018      Blood Urine >1.0 mg/dL (*)     pH Urine 5.5      Protein Albumin Urine 70 (*)     Urobilinogen Urine Normal      Nitrite Urine Negative      Leukocyte Esterase Urine 250 Mervin/uL (*)     Bacteria Urine Few (*)     RBC Urine >182 (*)     WBC Urine 19 (*)     Squamous Epithelials Urine 7 (*)    CBC WITH PLATELETS AND DIFFERENTIAL - Abnormal    WBC Count 7.0      RBC Count 4.95      Hemoglobin 11.7      Hematocrit 37.6      MCV 76 (*)     MCH 23.6 (*)     MCHC 31.1 (*)     RDW 18.6 (*)     Platelet Count 333      % Neutrophils 63      % Lymphocytes 31      % Monocytes 5      % Eosinophils 1      % Basophils 1      % Immature Granulocytes 0      NRBCs per 100 WBC 0      Absolute Neutrophils 4.4      Absolute Lymphocytes 2.1      Absolute Monocytes 0.3      Absolute Eosinophils 0.1      Absolute Basophils 0.0      Absolute Immature Granulocytes 0.0      Absolute NRBCs 0.0     BASIC METABOLIC PANEL - Normal    Sodium 135      Potassium 4.0      Chloride 102      Carbon Dioxide (CO2) 25      Anion Gap 8      Urea Nitrogen 7.8      Creatinine 0.73      GFR Estimate >90      Calcium 8.9      Glucose 95     HEPATIC FUNCTION PANEL - Normal    Protein Total 7.0      Albumin 3.7      Bilirubin Total 0.3      Alkaline Phosphatase 85      AST 14      ALT 18      Bilirubin Direct <0.08     LIPASE - Normal    Lipase 20     URINE CULTURE       RADIOLOGY:  Reviewed all pertinent imaging. Please see official radiology report.  Pelvis US, complete   Final Result   IMPRESSION: Normal-appearing endometrium without evidence of internal flow by color Doppler to suggest retained products.                 PROCEDURES:   None.     Jorge Alberto Subramanian PA-C  Emergency Medicine  [facility] EMERGENCY DEPARTMENT  [facility]  [id number]  [phone]  Dept: [phone]       Jorge Alberto Subramanian PA-C  06/04/25 1525

## 2025-06-05 ENCOUNTER — RESULTS FOLLOW-UP (OUTPATIENT)
Dept: NURSING | Facility: CLINIC | Age: 30
End: 2025-06-05

## 2025-06-05 LAB — BACTERIA UR CULT: NORMAL

## 2025-06-12 ENCOUNTER — OFFICE VISIT (OUTPATIENT)
Dept: FAMILY MEDICINE | Facility: CLINIC | Age: 30
End: 2025-06-12
Payer: MEDICAID

## 2025-06-12 VITALS
SYSTOLIC BLOOD PRESSURE: 103 MMHG | BODY MASS INDEX: 42.91 KG/M2 | OXYGEN SATURATION: 98 % | RESPIRATION RATE: 16 BRPM | HEIGHT: 64 IN | TEMPERATURE: 97.8 F | DIASTOLIC BLOOD PRESSURE: 68 MMHG | HEART RATE: 88 BPM

## 2025-06-12 DIAGNOSIS — K58.8 OTHER IRRITABLE BOWEL SYNDROME: ICD-10-CM

## 2025-06-12 DIAGNOSIS — N93.9 ABNORMAL UTERINE BLEEDING (AUB): Primary | ICD-10-CM

## 2025-06-12 PROCEDURE — 3074F SYST BP LT 130 MM HG: CPT

## 2025-06-12 PROCEDURE — 3078F DIAST BP <80 MM HG: CPT

## 2025-06-12 PROCEDURE — 99214 OFFICE O/P EST MOD 30 MIN: CPT | Mod: GC

## 2025-06-12 RX ORDER — IBUPROFEN 600 MG/1
600 TABLET, FILM COATED ORAL EVERY 6 HOURS PRN
Qty: 100 TABLET | Refills: 0 | Status: SHIPPED | OUTPATIENT
Start: 2025-06-12

## 2025-06-12 RX ORDER — DICYCLOMINE HCL 20 MG
40 TABLET ORAL 2 TIMES DAILY
Qty: 100 TABLET | Refills: 11 | Status: SHIPPED | OUTPATIENT
Start: 2025-06-12 | End: 2025-06-13

## 2025-06-12 NOTE — PATIENT INSTRUCTIONS
START ibuprofen to help with cramping and bleeding.     Someone will call to schedule visit with Ob/Gyn.

## 2025-06-12 NOTE — PROGRESS NOTES
Preceptor Attestation:   Patient seen, evaluated and discussed with the resident. I have verified the content of the note, which accurately reflects my assessment of the patient and the plan of care.    Supervising Physician:Hanna Morrison MD    [facility]

## 2025-06-12 NOTE — PROGRESS NOTES
"  {PROVIDER CHARTING PREFERENCE:427910}    Arianna Pete is a 30 year old, presenting for the following health issues:  Hospital F/U (Abdominal pain and vaginal bleeding. Had a surgery but did not go very well, still in pain. ) and Establish care       6/12/2025    10:29 AM   Additional Questions   Roomed by Indu   Accompanied by Self         6/12/2025    Information    services provided? No     Via the Health Maintenance questionnaire, the patient has reported the following services have been completed -Cervical Cancer Screening: [facility] 2025-05-19, this information has been sent to the abstraction team.  HPI    Feels like having contraceptions \"like I'm going into labor.\" Even before procedure.   Not open to any kind of birth control.   Bleeding has been off and on since ED. Randomly will have big clots, quite a few since then.   Changing pads every 1-3 hours still barby will bleed through otherwise.   Cramping hasn't gotten any better, if anything worse.   Periods have always been normal since 11.   After had son, there were different but didn't start getting bad on last August/September, started to bleed consistently. Has had maybe a few days here and there that she hasn't had bleeding.   Saw OB/GYN who recommended \"freezing of uterus or cervix.\"   Still no hormones.   Taking tylenol and oxycodone. Uses heating pads.  Previous doctor saw not to take ibuprofen barby has IBS.   Would be open to hysterectomy.   Previously did Lysteda in May - didn't work.     {MA/LPN/RN Pre-Provider Visit Orders- hCG/UA/Strep (Optional):224056}  {SUPERLIST (Optional):714472}  {additonal problems for provider to add (Optional):690868}    {ROS Picklists (Optional):217579}      Objective    /68 (BP Location: Right arm)   Pulse 88   Temp 97.8  F (36.6  C) (Oral)   Resp 16   Ht 1.626 m (5' 4\")   SpO2 98%   BMI 42.91 kg/m    Body mass index is 42.91 kg/m .  Physical Exam   {Exam List " (Optional):659375}    {Diagnostic Test Results (Optional):181646}        Signed Electronically by: Héctor Coelho MD  {Email feedback regarding this note to [email or web address]   :264081}   edema  ABDOMEN: soft, mild TTP over lower abdomen, no hepatosplenomegaly, no masses and bowel sounds normal  SKIN: no suspicious lesions or rashes  PSYCH: mentation appears normal, affect normal/bright        Signed Electronically by: Héctor Coelho MD

## 2025-06-13 ENCOUNTER — TELEPHONE (OUTPATIENT)
Dept: FAMILY MEDICINE | Facility: CLINIC | Age: 30
End: 2025-06-13
Payer: MEDICAID

## 2025-06-13 RX ORDER — DICYCLOMINE HCL 20 MG
40 TABLET ORAL 2 TIMES DAILY
Qty: 100 TABLET | Refills: 0 | Status: SHIPPED | OUTPATIENT
Start: 2025-06-13

## 2025-06-13 NOTE — TELEPHONE ENCOUNTER
Medication Question or Refill        What medication are you calling about (include dose and sig)?: dicyclomine (BENTYL) 20 MG tablet     Preferred Pharmacy:   [id number] - [place], [place] - [address]  [address] [place] [address]  Phone: [phone] Fax: [phone]      Controlled Substance Agreement on file:   CSA -- Patient Level:    CSA: None found at the patient level.       Who prescribed the medication?: Héctor Coelho    Do you need a refill? No    When did you use the medication last?     Patient offered an appointment? No    Do you have any questions or concerns?  Patient called stating she will be out of town until end of August and she is wondering if she is out of medication before she returns, would provider be able to send refills to out of town pharmacy or how would that work, per mom if this message can be routed to Dr. Damon. Will route to Dr. Coelho, provider that saw patient at last visit and route to Marisol per mothers request. Please review and advise further thank you.      Could we send this information to you in [facility] or would you prefer to receive a phone call?:   Patient would prefer a phone call   Okay to leave a detailed message?: Yes at Cell number on file:    Telephone Information:   Mobile [phone]

## 2025-06-14 NOTE — TELEPHONE ENCOUNTER
Looks like this medication refill was sent in my Dr Dela Cruz. Thanks Hanna!    Vivek Damon MD  June 14, 2025  9:05 AM

## 2025-07-03 ENCOUNTER — TELEPHONE (OUTPATIENT)
Dept: FAMILY MEDICINE | Facility: CLINIC | Age: 30
End: 2025-07-03
Payer: MEDICAID

## 2025-07-03 NOTE — TELEPHONE ENCOUNTER
Spoke with patient and informed her that she would need to contact the pharmacy and figure out how much the medication would cost as out of pocket. Patient agreed with plan and will call her pharmacy.     Jackie Gregg, TOM

## 2025-07-03 NOTE — TELEPHONE ENCOUNTER
Medication Question or Refill        What medication are you calling about (include dose and sig)?: dicyclomine (BENTYL) 20 MG tablet     Preferred Pharmacy:   [id number] - [place], [place] - [address]  [address] [place] [address]  Phone: [phone] Fax: [phone]      Controlled Substance Agreement on file:   CSA -- Patient Level:    CSA: None found at the patient level.       Who prescribed the medication?: Dr. Héctor Coelho    Do you need a refill? Yes    When did you use the medication last? Patient stated a few days ago    Patient offered an appointment? No    Do you have any questions or concerns?  Yes: Patient called stating she may have misplaced or threw away her medication when she went to clean her car a few days ago because she cannot find it, she is wondering if provider will send a prescription in for medication, she is having bowel issues since she has not been able to find her medication for a few days. I did let her know I can send a message but we give providers 24-72 hours to respond back. Please review and advise further thank you.      Could we [facility] this information to you [facility] or would you prefer to receive a phone call?:   Patient would prefer a phone call   Okay to leave a detailed message?: Yes

## 2025-07-03 NOTE — TELEPHONE ENCOUNTER
Number seems to be invalid. No ringing, phone call failed immediately after completion of dial. -MAUREEN Johnson RN

## 2025-07-03 NOTE — TELEPHONE ENCOUNTER
Writer tried calling number used by Pt to call back regarding medication refill as number on file is not valid. No answer with this phone number as well. FARHEEN Johnson RN

## 2025-07-08 ENCOUNTER — LAB (OUTPATIENT)
Dept: LAB | Facility: CLINIC | Age: 30
End: 2025-07-08
Payer: MEDICAID

## 2025-07-08 ENCOUNTER — OFFICE VISIT (OUTPATIENT)
Dept: OBGYN | Facility: CLINIC | Age: 30
End: 2025-07-08
Payer: MEDICAID

## 2025-07-08 ENCOUNTER — TELEPHONE (OUTPATIENT)
Dept: OBGYN | Facility: CLINIC | Age: 30
End: 2025-07-08

## 2025-07-08 VITALS
BODY MASS INDEX: 42.91 KG/M2 | DIASTOLIC BLOOD PRESSURE: 72 MMHG | HEART RATE: 79 BPM | SYSTOLIC BLOOD PRESSURE: 108 MMHG | HEIGHT: 64 IN

## 2025-07-08 DIAGNOSIS — N93.9 ABNORMAL UTERINE BLEEDING (AUB): ICD-10-CM

## 2025-07-08 DIAGNOSIS — N93.9 ABNORMAL UTERINE BLEEDING (AUB): Primary | ICD-10-CM

## 2025-07-08 LAB
ERYTHROCYTE [DISTWIDTH] IN BLOOD BY AUTOMATED COUNT: 17.3 % (ref 10–15)
FERRITIN SERPL-MCNC: 7 NG/ML (ref 6–175)
HCT VFR BLD AUTO: 35.8 % (ref 35–47)
HGB BLD-MCNC: 11.5 G/DL (ref 11.7–15.7)
MCH RBC QN AUTO: 24.5 PG (ref 26.5–33)
MCHC RBC AUTO-ENTMCNC: 32.1 G/DL (ref 31.5–36.5)
MCV RBC AUTO: 76 FL (ref 78–100)
PLATELET # BLD AUTO: 362 10E3/UL (ref 150–450)
PROLACTIN SERPL 3RD IS-MCNC: 9 NG/ML (ref 5–23)
RBC # BLD AUTO: 4.7 10E6/UL (ref 3.8–5.2)
SHBG SERPL-SCNC: 28 NMOL/L (ref 30–135)
TSH SERPL DL<=0.005 MIU/L-ACNC: 1.76 UIU/ML (ref 0.3–4.2)
WBC # BLD AUTO: 8.6 10E3/UL (ref 4–11)

## 2025-07-08 PROCEDURE — 82728 ASSAY OF FERRITIN: CPT

## 2025-07-08 PROCEDURE — 84403 ASSAY OF TOTAL TESTOSTERONE: CPT

## 2025-07-08 PROCEDURE — 3078F DIAST BP <80 MM HG: CPT | Performed by: OBSTETRICS & GYNECOLOGY

## 2025-07-08 PROCEDURE — 84443 ASSAY THYROID STIM HORMONE: CPT

## 2025-07-08 PROCEDURE — 3074F SYST BP LT 130 MM HG: CPT | Performed by: OBSTETRICS & GYNECOLOGY

## 2025-07-08 PROCEDURE — 99203 OFFICE O/P NEW LOW 30 MIN: CPT | Performed by: OBSTETRICS & GYNECOLOGY

## 2025-07-08 PROCEDURE — 36415 COLL VENOUS BLD VENIPUNCTURE: CPT

## 2025-07-08 PROCEDURE — 85018 HEMOGLOBIN: CPT

## 2025-07-08 PROCEDURE — 84146 ASSAY OF PROLACTIN: CPT

## 2025-07-08 PROCEDURE — 84270 ASSAY OF SEX HORMONE GLOBUL: CPT

## 2025-07-08 PROCEDURE — G0463 HOSPITAL OUTPT CLINIC VISIT: HCPCS | Performed by: OBSTETRICS & GYNECOLOGY

## 2025-07-08 RX ORDER — ACETAMINOPHEN AND CODEINE PHOSPHATE 120; 12 MG/5ML; MG/5ML
0.35 SOLUTION ORAL DAILY
Qty: 84 TABLET | Refills: 3 | Status: SHIPPED | OUTPATIENT
Start: 2025-07-08

## 2025-07-08 RX ORDER — MEDROXYPROGESTERONE ACETATE 10 MG
10 TABLET ORAL DAILY
Qty: 30 TABLET | Refills: 1 | Status: SHIPPED | OUTPATIENT
Start: 2025-07-08

## 2025-07-08 NOTE — LETTER
2025       RE: Juan R Uriostegui  [address] [place] [address]     Dear Colleague,    Thank you for referring your patient, Juan R Uriostegui, to the [facility] [place] [facility]. Please see a copy of my visit note below.    [facility]  Gynecology Visit    HPI:    Juan R Uriostegui is a 30 year old  here for abnormal uterine bleeding. She has had worsened abnormal and constant bleeding since 2024. Prior to having her son in , she had regular periods. Afterwards, her periods became irregular, but the abdominal pain and bleeding have become more constant over the last months. She was admitted to [facility] for this issue in May 2025. Endometrial biopsy showed disordered proliferative endometrium with no hyperplasia, atypia or malignancy. On , she had a D&C which showed the same. She went to the ED for continued symptoms on . Since then, her abdominal pain and bleeding have not improved at all. She had a week where things were slightly better but her pain and bleeding are now worse than they were prior to the D&C. Her suprapubic abdominal pain feels like she is having a constant labor contraction and associated nausea and vomiting. The pain sometimes radiates to her lower back. Has been trying ibuprofen and tylenol, though these do not help significantly. Finds some relief with heat pads but it is insufficient. She is also frequently passing clots and is bleeding through 1 pad/hr. Denies any lightheadedness or dizzienss but does feel more fatigued and tired than before. No abnormal bowel movements, no diarrhea or constipation.    She is hesitant to try any kind of birth control since she gained a lot of weight with OCP and IUD in the past. Is open to a hysterectomy but would rather it be in the fall.     GYN History  - LMP: No LMP recorded. (Menstrual status: Irregular Periods).  - Pap Smears:  ASCUS in 2018  - Contraception: OCP and IUD in the past, gained weight on both    OBHx  OB History    Para Term  AB Living   1 1 1 0 0 1   SAB IAB Ectopic Multiple Live Births   0 0 0 0 1      # Outcome Date GA Lbr Donn/2nd Weight Sex Type Anes PTL Lv   1 Term 17 39w6d / 00:46 2.912 kg (6 lb 6.7 oz) M Vag-Spont EPI N JAVAN      Birth Comments: Time of Birth: 10:52pmBili Result: Type: sercum Level 9.6 Age 34 hoursNB Screening Completed: YesHearing Screening Result: PassedMom's Blood Type: O positiveAny Complicating Factors: No      Name: JOSE BABB      Apgar1: 9  Apgar5: 9     Past Medical History:   Diagnosis Date     Asthma      Bipolar 1 disorder (H)      Depression      IBS (irritable bowel syndrome)      Obesity      Past Surgical History:   Procedure Laterality Date     HYSTEROSALPINGOGRAM      Blocked Fallopian Tubes     CT LAP,APPENDECTOMY N/A 02/15/2021    Procedure: APPENDECTOMY, LAPAROSCOPIC, Excision of epiploic appendagitis;  Surgeon: Bonnie Kaur MD;  Location: [facility];  Service: General     TONSILLECTOMY         Current Outpatient Medications:      ARIPiprazole (ABILIFY) 10 MG tablet, Take 1-2 tablets by mouth daily for bipolar disorder, Disp: 90 tablet, Rfl: 0     dicyclomine (BENTYL) 20 MG tablet, Take 2 tablets (40 mg) by mouth 2 times daily., Disp: 100 tablet, Rfl: 0     hydrocortisone (ANUSOL-HC) 25 MG suppository, Place 1 suppository (25 mg) rectally 2 times daily as needed for hemorrhoids., Disp: 14 suppository, Rfl: 0     ibuprofen (ADVIL/MOTRIN) 600 MG tablet, Take 1 tablet (600 mg) by mouth every 6 hours as needed for moderate pain., Disp: 100 tablet, Rfl: 0     Lidocaine (LIDOCARE) 4 % Patch, Place 1 patch onto the skin every 24 hours. To prevent lidocaine toxicity, patient should be patch free for 12 hrs daily., Disp: 5 patch, Rfl: 0     norethindrone (MICRONOR) 0.35 MG tablet, Take 1 tablet (0.35 mg) by mouth daily., Disp: 84 tablet,  Rfl: 3     omeprazole (PRILOSEC) 20 MG DR capsule, TAKE 1 CAPSULE(20 MG) BY MOUTH DAILY BEFORE AND BREAKFAST, Disp: 90 capsule, Rfl: 1     ondansetron (ZOFRAN ODT) 4 MG ODT tab, Take 1 tablet (4 mg) by mouth every 8 hours as needed for nausea., Disp: 30 tablet, Rfl: 1     ondansetron (ZOFRAN ODT) 4 MG ODT tab, Take 1 tablet (4 mg) by mouth every 8 hours as needed for nausea, Disp: 20 tablet, Rfl: 0     tolterodine ER (DETROL LA) 2 MG 24 hr capsule, TAKE 1 CAPSULE(2 MG) BY MOUTH DAILY, Disp: 90 capsule, Rfl: 3    Allergies   Allergen Reactions     Toradol [Ketorolac Tromethamine] Rash     Vancomycin Rash     Family History   Problem Relation Age of Onset     Colon Cancer Mother      Cancer Father      Cervical Cancer Maternal Grandmother        Social History     Socioeconomic History     Marital status: Single     Number of children: 1   Tobacco Use     Smoking status: Never     Passive exposure: Never     Smokeless tobacco: Never   Vaping Use     Vaping status: Never Used   Substance and Sexual Activity     Alcohol use: No     Drug use: Never     Sexual activity: Yes     Partners: Male   Social History Narrative    Works at a nursing home.  Has 1 3-year-old son.     Social Drivers of Health     Financial Resource Strain: Low Risk  (9/20/2023)    Financial Resource Strain      Within the past 12 months, have you or your family members you live with been unable to get utilities (heat, electricity) when it was really needed?: No   Food Insecurity: No Food Insecurity (5/9/2025)    Received from [facility]    Hunger Vital Sign      Worried About Running Out of Food in the Last Year: Never true      Ran Out of Food in the Last Year: Never true   Transportation Needs: No Transportation Needs (5/9/2025)    Received from [facility]    PRAPARE - Transportation      Lack of Transportation (Medical): No      Lack of Transportation (Non-Medical): No   Interpersonal Safety: Low Risk  (6/12/2025)    Interpersonal Safety  "     Do you feel physically and emotionally safe where you currently live?: Yes      Within the past 12 months, have you been hit, slapped, kicked or otherwise physically hurt by someone?: No      Within the past 12 months, have you been humiliated or emotionally abused in other ways by your partner or ex-partner?: No   Housing Stability: Unknown (5/9/2025)    [facility]    Housing Stability Vital Sign      Unable to Pay for Housing in the Last Year: No      Homeless in the Last Year: No     ROS: 10-Point ROS negative except as noted in HPI    Physical Exam  /72   Pulse 79   Ht 1.626 m (5' 4\")   BMI 42.91 kg/m    Gen: well-appearing, NAD  HEENT: normocephalic, atraumatic  CV: warm, well perfused  Pulm: normal work of breathing and respiratory rate    Pelvic US 6/4/25:  FINDINGS:  UTERUS: 8.7 x 5.6 x 3.8 cm. Normal in size and position with no masses.  ENDOMETRIUM: 7 mm. Normal smooth endometrium. No focal endometrial masses or evidence of internal flow within the endometrium by color Doppler.  RIGHT OVARY: 3.3 x 1.5 x 2.3 cm. Normal.   LEFT OVARY: 3.2 x 2.1 x 2.0 cm. Normal.  No significant free fluid.                                                            IMPRESSION: Normal-appearing endometrium without evidence of internal flow by color Doppler to suggest retained products.    Surgical Pathology 6/2/25:  Endometrium, curettage:    Disordered proliferative endometrium  Negative for hyperplasia, atypia, or malignancy    Hysterosalpingogram 4/21/23:  IMPRESSION:  1.  Uterus and cornual portions of both fallopian tubes are normal.  2.  Unable to opacify or obtain free spill from either fallopian tubes    Pap 5/19/25: NIL/Other HR HPV POS   Pap 10/15/2018: ASCUS/Other HR HPV POS     Assessment/Plan:  # Abnormal Uterine Bleeding and dysmenorrhea:  - recent imaging and D&C have ruled out malignancy and uterine structural causes  - offered evaluation for ovulatory dysfunction with prolactin " (given history of lithium use), TSH, and testosterone  - given prolonged bleeding duration, with rule out RON with ferritin and CBC  - reviewed use of hormonal contraceptives in the treatment of heavy menses and how they work. After discussion of risks/benefits/side effects profiles of all methods, Juan R is most interested in norethindrone 0.35mg daily. We discussed that this does reduce flow but can cause unpredictable bleeding and that it is not associated with weight gain, which is a major concern Juan R has due to prior experience with pills and IUD. We did also review endometrial ablation and its limitations (likely benefit x 5 years, risk of difficulty in assessing endometrium after procedure), and finally hysterectomy. We discussed that hysterectomy will definitively stop menses, but that I am unsure how much her pain will improve as I am not sure uterine cramping is the only source of her symptoms.  - after this discussion, Juan R would like to use the minipill as a bridge to hysterectomy. She understands that after this surgery she could not have children; however, she has come to terms with this as she had a hysterosalpingogram in 2023 that showed blocked fallopian tubes.     # Other HR HPV POS  - after the visit, I reviewed Juan R's chart, and she has had 2 pap smears with HR HPV. Will discuss recommendation for colposcopy at next visit.     Plan f/up in 4-6 weeks to see how bleeding has improved, and to plan for hysterectomy if that continues to be Juan R's definitive plan.     Return to clinic in 4-6 weeks. Staffed with Dr. Charles.     Yong Llamas, MS3    OBGYN Attending Addendum    I appreciate the note above by medical student, Yong Llamas.  I, Delia Charles, was present with the medical student, who participated in the service and in the documentation of the note. I have verified the history and personally performed the physical exam and medical decision making. I have edited accordingly and  agree with the assessment and plan of care as documented in the note.    Delia Charles MD, MSCI    [facility]  07/08/25    Again, thank you for allowing me to participate in the care of your patient.      Sincerely,    Delia Charles MD

## 2025-07-08 NOTE — PROGRESS NOTES
[facility]  Gynecology Visit    HPI:    Juan R Uriostegui is a 30 year old  here for abnormal uterine bleeding. She has had worsened abnormal and constant bleeding since 2024. Prior to having her son in , she had regular periods. Afterwards, her periods became irregular, but the abdominal pain and bleeding have become more constant over the last months. She was admitted to [facility] for this issue in May 2025. Endometrial biopsy showed disordered proliferative endometrium with no hyperplasia, atypia or malignancy. On , she had a D&C which showed the same. She went to the ED for continued symptoms on . Since then, her abdominal pain and bleeding have not improved at all. She had a week where things were slightly better but her pain and bleeding are now worse than they were prior to the D&C. Her suprapubic abdominal pain feels like she is having a constant labor contraction and associated nausea and vomiting. The pain sometimes radiates to her lower back. Has been trying ibuprofen and tylenol, though these do not help significantly. Finds some relief with heat pads but it is insufficient. She is also frequently passing clots and is bleeding through 1 pad/hr. Denies any lightheadedness or dizzienss but does feel more fatigued and tired than before. No abnormal bowel movements, no diarrhea or constipation.    She is hesitant to try any kind of birth control since she gained a lot of weight with OCP and IUD in the past. Is open to a hysterectomy but would rather it be in the fall.     GYN History  - LMP: No LMP recorded. (Menstrual status: Irregular Periods).  - Pap Smears: ASCUS in 2018  - Contraception: OCP and IUD in the past, gained weight on both    OBHx  OB History    Para Term  AB Living   1 1 1 0 0 1   SAB IAB Ectopic Multiple Live Births   0 0 0 0 1      # Outcome Date GA Lbr Donn/2nd Weight Sex Type Anes PTL Lv   1 Term 17 39w6d / 00:46 2.912 kg (6 lb 6.7  oz) M Vag-Spont EPI N JAVAN      Birth Comments: Time of Birth: 10:52pmBili Result: Type: sercum Level 9.6 Age 34 hoursNB Screening Completed: YesHearing Screening Result: PassedMom's Blood Type: O positiveAny Complicating Factors: No      Name: JOSE BABB      Apgar1: 9  Apgar5: 9     Past Medical History:   Diagnosis Date    Asthma     Bipolar 1 disorder (H)     Depression     IBS (irritable bowel syndrome)     Obesity      Past Surgical History:   Procedure Laterality Date    HYSTEROSALPINGOGRAM  2023    Blocked Fallopian Tubes    LA LAP,APPENDECTOMY N/A 02/15/2021    Procedure: APPENDECTOMY, LAPAROSCOPIC, Excision of epiploic appendagitis;  Surgeon: Bonnie Kaur MD;  Location: [facility] [place];  Service: General    TONSILLECTOMY         Current Outpatient Medications:     ARIPiprazole (ABILIFY) 10 MG tablet, Take 1-2 tablets by mouth daily for bipolar disorder, Disp: 90 tablet, Rfl: 0    dicyclomine (BENTYL) 20 MG tablet, Take 2 tablets (40 mg) by mouth 2 times daily., Disp: 100 tablet, Rfl: 0    hydrocortisone (ANUSOL-HC) 25 MG suppository, Place 1 suppository (25 mg) rectally 2 times daily as needed for hemorrhoids., Disp: 14 suppository, Rfl: 0    ibuprofen (ADVIL/MOTRIN) 600 MG tablet, Take 1 tablet (600 mg) by mouth every 6 hours as needed for moderate pain., Disp: 100 tablet, Rfl: 0    Lidocaine (LIDOCARE) 4 % Patch, Place 1 patch onto the skin every 24 hours. To prevent lidocaine toxicity, patient should be patch free for 12 hrs daily., Disp: 5 patch, Rfl: 0    norethindrone (MICRONOR) 0.35 MG tablet, Take 1 tablet (0.35 mg) by mouth daily., Disp: 84 tablet, Rfl: 3    omeprazole (PRILOSEC) 20 MG DR capsule, TAKE 1 CAPSULE(20 MG) BY MOUTH DAILY BEFORE AND BREAKFAST, Disp: 90 capsule, Rfl: 1    ondansetron (ZOFRAN ODT) 4 MG ODT tab, Take 1 tablet (4 mg) by mouth every 8 hours as needed for nausea., Disp: 30 tablet, Rfl: 1    ondansetron (ZOFRAN ODT) 4 MG ODT tab, Take 1 tablet (4 mg)  by mouth every 8 hours as needed for nausea, Disp: 20 tablet, Rfl: 0    tolterodine ER (DETROL LA) 2 MG 24 hr capsule, TAKE 1 CAPSULE(2 MG) BY MOUTH DAILY, Disp: 90 capsule, Rfl: 3    Allergies   Allergen Reactions    Toradol [Ketorolac Tromethamine] Rash    Vancomycin Rash     Family History   Problem Relation Age of Onset    Colon Cancer Mother     Cancer Father     Cervical Cancer Maternal Grandmother        Social History     Socioeconomic History    Marital status: Single    Number of children: 1   Tobacco Use    Smoking status: Never     Passive exposure: Never    Smokeless tobacco: Never   Vaping Use    Vaping status: Never Used   Substance and Sexual Activity    Alcohol use: No    Drug use: Never    Sexual activity: Yes     Partners: Male   Social History Narrative    Works at a nursing home.  Has 1 3-year-old son.     Social Drivers of Health     Financial Resource Strain: Low Risk  (9/20/2023)    Financial Resource Strain     Within the past 12 months, have you or your family members you live with been unable to get utilities (heat, electricity) when it was really needed?: No   Food Insecurity: No Food Insecurity (5/9/2025)    Received from [employer]    Hunger Vital Sign     Worried About Running Out of Food in the Last Year: Never true     Ran Out of Food in the Last Year: Never true   Transportation Needs: No Transportation Needs (5/9/2025)    Received from [employer]    PRAPARE - Transportation     Lack of Transportation (Medical): No     Lack of Transportation (Non-Medical): No   Interpersonal Safety: Low Risk  (6/12/2025)    Interpersonal Safety     Do you feel physically and emotionally safe where you currently live?: Yes     Within the past 12 months, have you been hit, slapped, kicked or otherwise physically hurt by someone?: No     Within the past 12 months, have you been humiliated or emotionally abused in other ways by your partner or ex-partner?: No   Housing Stability: Unknown  "(5/9/2025)    Received from [facility] Stability Vital Sign     Unable to Pay for Housing in the Last Year: No     Homeless in the Last Year: No     ROS: 10-Point ROS negative except as noted in HPI    Physical Exam  /72   Pulse 79   Ht 1.626 m (5' 4\")   BMI 42.91 kg/m    Gen: well-appearing, NAD  HEENT: normocephalic, atraumatic  CV: warm, well perfused  Pulm: normal work of breathing and respiratory rate    Pelvic US 6/4/25:  FINDINGS:  UTERUS: 8.7 x 5.6 x 3.8 cm. Normal in size and position with no masses.  ENDOMETRIUM: 7 mm. Normal smooth endometrium. No focal endometrial masses or evidence of internal flow within the endometrium by color Doppler.  RIGHT OVARY: 3.3 x 1.5 x 2.3 cm. Normal.   LEFT OVARY: 3.2 x 2.1 x 2.0 cm. Normal.  No significant free fluid.                                                            IMPRESSION: Normal-appearing endometrium without evidence of internal flow by color Doppler to suggest retained products.    Surgical Pathology 6/2/25:  Endometrium, curettage:    Disordered proliferative endometrium  Negative for hyperplasia, atypia, or malignancy    Hysterosalpingogram 4/21/23:  IMPRESSION:  1.  Uterus and cornual portions of both fallopian tubes are normal.  2.  Unable to opacify or obtain free spill from either fallopian tubes    Pap 5/19/25: NIL/Other HR HPV POS   Pap 10/15/2018: ASCUS/Other HR HPV POS     Assessment/Plan:  # Abnormal Uterine Bleeding and dysmenorrhea:  - recent imaging and D&C have ruled out malignancy and uterine structural causes  - offered evaluation for ovulatory dysfunction with prolactin (given history of lithium use), TSH, and testosterone  - given prolonged bleeding duration, with rule out RON with ferritin and CBC  - reviewed use of hormonal contraceptives in the treatment of heavy menses and how they work. After discussion of risks/benefits/side effects profiles of all methods, Juan R is most interested in norethindrone 0.35mg " daily. We discussed that this does reduce flow but can cause unpredictable bleeding and that it is not associated with weight gain, which is a major concern Juan R has due to prior experience with pills and IUD. We did also review endometrial ablation and its limitations (likely benefit x 5 years, risk of difficulty in assessing endometrium after procedure), and finally hysterectomy. We discussed that hysterectomy will definitively stop menses, but that I am unsure how much her pain will improve as I am not sure uterine cramping is the only source of her symptoms.  - after this discussion, Juan R would like to use the minipill as a bridge to hysterectomy. She understands that after this surgery she could not have children; however, she has come to terms with this as she had a hysterosalpingogram in 2023 that showed blocked fallopian tubes.     # Other HR HPV POS  - after the visit, I reviewed Juan R's chart, and she has had 2 pap smears with HR HPV. Will discuss recommendation for colposcopy at next visit.     Plan f/up in 4-6 weeks to see how bleeding has improved, and to plan for hysterectomy if that continues to be Juan R's definitive plan.     Return to clinic in 4-6 weeks. Staffed with Dr. Charles.     Yong Llamas, MS3    OBGYN Attending Addendum    I appreciate the note above by medical student, Yong Llamas.  I, Delia Charles, was present with the medical student, who participated in the service and in the documentation of the note. I have verified the history and personally performed the physical exam and medical decision making. I have edited accordingly and agree with the assessment and plan of care as documented in the note.    Delia Charles MD, MSCI    [facility]/OBGYN  07/08/25

## 2025-07-08 NOTE — TELEPHONE ENCOUNTER
Juan R was seen this morning for AUB.  About 1-2 hours ago, bleeding increased significantly.  She soaked 2 full sized pads in under an hour and reports that bleeding is clotty.    She denies feeling lightheaded or dizzy.    Advised ED.  She declined.  She states that every time she goes to ED they say she needs to go see her OB.    Routed to Dr Charles

## 2025-07-08 NOTE — TELEPHONE ENCOUNTER
Called and discussed recommendation from Dr Charles to begin 10 mg daily of provera until she returns next month.  If bleeding is still excessive in 2 days, should call clinic.  Also advised if acute symptoms such as feeling lightheaded/dizzy, should go to ED

## 2025-07-09 LAB
TESTOST FREE SERPL-MCNC: 0.14 NG/DL
TESTOST SERPL-MCNC: 7 NG/DL (ref 8–60)

## 2025-07-10 ENCOUNTER — RESULTS FOLLOW-UP (OUTPATIENT)
Dept: OBGYN | Facility: CLINIC | Age: 30
End: 2025-07-10
Payer: MEDICAID

## 2025-07-10 NOTE — TELEPHONE ENCOUNTER
[facility] Call Center    Phone Message    May a detailed message be left on voicemail: yes     Reason for Call: Other: Pt was calling to speak to a nurse regarding some bleeding, see messages below. Pt states bleeding has not slowed and she is wanting to discuss what next steps are. A message was sent via secure chat with no response. Please call pt back to discuss     Action Taken: Other: OBGYN    Travel Screening: Not Applicable     Date of Service:

## 2025-07-10 NOTE — TELEPHONE ENCOUNTER
Attempted to call patient x 3 times but received dial tone all times, unable to leave voicemail.     [employer] message sent to patient.

## 2025-07-10 NOTE — TELEPHONE ENCOUNTER
Spoke with Juan R. She states she is going through a pad in an hour or less for the last couple of days, with 20 clots that came out in a row just a moment ago. She states her clothes keep getting ruined and she is taking the Provera. RN advised patient to go to the ED for evaluation. Patient states she goes to the ED and they tell her to Follow-up with a specialist and they do nothing. Went to Dr. Charles. She states that the patient needs to come to our ED and to call them and have them consult our providers to care for the patient. Dr. Charles asked that this RN call the ED and request they call our provider when the patient arrives. Notified patient of this- she is agreeable to plan of care. Called [facility] ED RN and gave information and request. She wrote it down.

## 2025-07-19 ENCOUNTER — HEALTH MAINTENANCE LETTER (OUTPATIENT)
Age: 30
End: 2025-07-19